# Patient Record
Sex: FEMALE | Race: BLACK OR AFRICAN AMERICAN | Employment: FULL TIME | URBAN - METROPOLITAN AREA
[De-identification: names, ages, dates, MRNs, and addresses within clinical notes are randomized per-mention and may not be internally consistent; named-entity substitution may affect disease eponyms.]

---

## 2017-06-23 ENCOUNTER — OP HISTORICAL/CONVERTED ENCOUNTER (OUTPATIENT)
Dept: OTHER | Age: 52
End: 2017-06-23

## 2017-10-27 LAB — COLONOSCOPY, EXTERNAL: NORMAL

## 2018-03-05 ENCOUNTER — OP HISTORICAL/CONVERTED ENCOUNTER (OUTPATIENT)
Dept: OTHER | Age: 53
End: 2018-03-05

## 2018-06-13 ENCOUNTER — ED HISTORICAL/CONVERTED ENCOUNTER (OUTPATIENT)
Dept: OTHER | Age: 53
End: 2018-06-13

## 2019-01-17 ENCOUNTER — OP HISTORICAL/CONVERTED ENCOUNTER (OUTPATIENT)
Dept: OTHER | Age: 54
End: 2019-01-17

## 2019-03-17 ENCOUNTER — OP HISTORICAL/CONVERTED ENCOUNTER (OUTPATIENT)
Dept: OTHER | Age: 54
End: 2019-03-17

## 2020-03-20 LAB
HBA1C MFR BLD HPLC: 6.2 %
LDL-C, EXTERNAL: NORMAL

## 2020-08-19 ENCOUNTER — OFFICE VISIT (OUTPATIENT)
Dept: PRIMARY CARE CLINIC | Age: 55
End: 2020-08-19
Payer: MEDICARE

## 2020-08-19 VITALS
HEART RATE: 102 BPM | SYSTOLIC BLOOD PRESSURE: 132 MMHG | OXYGEN SATURATION: 97 % | TEMPERATURE: 98.1 F | DIASTOLIC BLOOD PRESSURE: 80 MMHG

## 2020-08-19 DIAGNOSIS — Z13.6 SCREENING FOR CARDIOVASCULAR, RESPIRATORY, AND GENITOURINARY DISEASES: Primary | ICD-10-CM

## 2020-08-19 DIAGNOSIS — Z13.89 SCREENING FOR CARDIOVASCULAR, RESPIRATORY, AND GENITOURINARY DISEASES: Primary | ICD-10-CM

## 2020-08-19 DIAGNOSIS — Z13.83 SCREENING FOR CARDIOVASCULAR, RESPIRATORY, AND GENITOURINARY DISEASES: Primary | ICD-10-CM

## 2020-08-19 PROCEDURE — 99213 OFFICE O/P EST LOW 20 MIN: CPT | Performed by: NURSE PRACTITIONER

## 2020-08-19 NOTE — PROGRESS NOTES
Robert Hayden is a 54 y.o. female who was seen in clinic today (8/19/2020) for an acute visit. Assessment/Plan:     There is a med-high probability that this is COVID-19. * COVID-19 sample collected and submitted       * Patient given detailed CDC instructions contained within After Visit Summary    Diagnoses and all orders for this visit:    1. Screening for cardiovascular, respiratory, and genitourinary diseases  -     NOVEL CORONAVIRUS (COVID-19)       Covid like s/s with no known covid exposure. Discussed expected course/resolution/complications of diagnosis in detail with patient. Advised pt on CDC guidance, OTC medications for symptom management, red flags reviewed and should develop to seek emergency medical attn. Encouraged pt to maintain health through a balanced diet, high in fiber and plants;small freq meals if any nausea; staying well hydrated by drinking water or occ low sugar non carbonated beverages; reviewed importance of proper sleep habitus, 7-8hrs of rest; and emphasized moderate exercise 30 mins a day/150mins a week. Reviewed with her that COVID-19 pandemic is an evolving situation with rapidly changing recommendations & guidelines, continue to practice hand hygiene, social distancing, wearing of facial coverings. Regardless of testing results, should still follow CDC guidelines as there is a chance of a false negative, or symptoms may be due to a cold or flu which are also transmissible. Medical decisions are made based on the the best information available at the time. Recommended she stay tuned for updates published by trusted sources and to advise your PCP of any unexpected changes in clinical condition     Subjective:   Sukhjinder Barillas was seen today for Concern For COVID-19 (Coronavirus)    Works at [employer], out of work since [employer] 5 with s/s. Has med hx including HIV, htn,  COPD. She has been using symbicort and rescue inhaler with some relief of s/s.   She notes h/a, fatigue, fever, cough, sob, diarrhea, all of which has been intermittent, some days better some days worse. Today she is feeling better. Former user of tob, quit 1 year ago. Travel Screening     Question   Response    In the last month, have you been in contact with someone who was confirmed or suspected to have Coronavirus / COVID-19? Yes    Do you have any of the following symptoms? Fever;Cough; Shortness of breath;Muscle pain;Severe headache;Diarrhea    Have you traveled internationally in the last month? No      Travel History   Travel since 07/19/20     No documented travel since 07/19/20          ROS - Pertinent items are noted in HPI    There is no problem list on file for this patient. Home Medications    Not on File      Allergies   Allergen Reactions    Bactrim [Sulfamethoprim] Nausea and Vomiting          Objective:   Physical Exam  General:  Overweight, alert, cooperative, no distress   Ears: Normal external ear canals AU   Sinuses: Normal paranasal sinuses without tenderness   Mouth:  Lips, mucosa, and tongue normal. Teeth and gums normal: oropharynx: clear   Neck: supple, symmetrical, trachea midline. Heart: normal rate, regular rhythm, normal S1, S2, no murmurs, rubs, clicks or gallops. Lungs: clear to auscultation bilaterally       Visit Vitals  /80 (BP 1 Location: Left arm, BP Patient Position: Sitting)   Pulse (!) 102   Temp 98.1 °F (36.7 °C)   SpO2 97%         Disclaimer:        Medication risks/benefits/costs/interactions/alternatives discussed with patient. She was given an after visit summary which includes diagnoses, current medications, & vitals. She expressed understanding with the diagnosis and plan. Aspects of this note may have been generated using voice recognition software. Despite editing, there may be some syntax errors.        Telma Madrid NP

## 2020-08-20 LAB — SARS-COV-2, NAA: NOT DETECTED

## 2020-08-21 ENCOUNTER — TELEPHONE (OUTPATIENT)
Dept: PRIMARY CARE CLINIC | Age: 55
End: 2020-08-21

## 2020-09-08 PROBLEM — R13.10 DYSPHAGIA: Status: ACTIVE | Noted: 2020-09-08

## 2020-09-08 PROBLEM — K64.9 HEMORRHOIDS: Status: ACTIVE | Noted: 2020-09-08

## 2020-09-08 PROBLEM — J32.9 CHRONIC SINUSITIS: Status: ACTIVE | Noted: 2020-09-08

## 2020-09-08 PROBLEM — L29.9 PRURITUS OF SKIN: Status: ACTIVE | Noted: 2020-09-08

## 2020-09-08 PROBLEM — M54.9 CHRONIC BACK PAIN: Status: ACTIVE | Noted: 2020-09-08

## 2020-09-08 PROBLEM — E87.6 HYPOKALEMIA: Status: ACTIVE | Noted: 2020-09-08

## 2020-09-08 PROBLEM — I10 ESSENTIAL HYPERTENSION: Status: ACTIVE | Noted: 2020-09-08

## 2020-09-08 PROBLEM — R63.5 ABNORMAL WEIGHT GAIN: Status: ACTIVE | Noted: 2020-09-08

## 2020-09-08 PROBLEM — G89.29 CHRONIC BACK PAIN: Status: ACTIVE | Noted: 2020-09-08
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
## 2020-09-08 PROBLEM — R07.0 PAIN IN THROAT: Status: ACTIVE | Noted: 2020-09-08

## 2020-09-08 PROBLEM — F17.200 TOBACCO DEPENDENCE: Status: ACTIVE | Noted: 2020-09-08

## 2020-09-08 PROBLEM — G58.9 MONONEURITIS: Status: ACTIVE | Noted: 2020-09-08

## 2020-09-08 PROBLEM — G45.9 TIA (TRANSIENT ISCHEMIC ATTACK): Status: ACTIVE | Noted: 2020-09-08

## 2020-09-08 PROBLEM — K21.9 GERD (GASTROESOPHAGEAL REFLUX DISEASE): Status: ACTIVE | Noted: 2020-09-08

## 2020-09-08 PROBLEM — E55.9 VITAMIN D DEFICIENCY: Status: ACTIVE | Noted: 2020-09-08

## 2020-09-08 PROBLEM — D64.9 ANEMIA: Status: ACTIVE | Noted: 2020-09-08

## 2020-09-08 PROBLEM — F32.A DEPRESSIVE DISORDER: Status: ACTIVE | Noted: 2020-09-08

## 2020-09-08 PROBLEM — Z91.199 NONCOMPLIANCE WITH TREATMENT: Status: ACTIVE | Noted: 2020-09-08

## 2020-09-08 PROBLEM — J44.9 COPD (CHRONIC OBSTRUCTIVE PULMONARY DISEASE) (HCC): Status: ACTIVE | Noted: 2020-09-08

## 2020-09-08 PROBLEM — G47.00 INSOMNIA: Status: ACTIVE | Noted: 2020-09-08

## 2020-09-08 PROBLEM — L03.119 CELLULITIS OF LOWER LIMB: Status: ACTIVE | Noted: 2020-09-08

## 2020-09-09 ENCOUNTER — OFFICE VISIT (OUTPATIENT)
Dept: INTERNAL MEDICINE CLINIC | Age: 55
End: 2020-09-09
Payer: MEDICARE

## 2020-09-09 VITALS
DIASTOLIC BLOOD PRESSURE: 80 MMHG | SYSTOLIC BLOOD PRESSURE: 110 MMHG | BODY MASS INDEX: 28.67 KG/M2 | TEMPERATURE: 97.9 F | HEART RATE: 68 BPM | WEIGHT: 161.8 LBS | HEIGHT: 63 IN | RESPIRATION RATE: 18 BRPM | OXYGEN SATURATION: 99 %

## 2020-09-09 DIAGNOSIS — Z02.9 ADMINISTRATIVE ENCOUNTER: ICD-10-CM

## 2020-09-09 DIAGNOSIS — H10.32 ACUTE CONJUNCTIVITIS OF LEFT EYE, UNSPECIFIED ACUTE CONJUNCTIVITIS TYPE: ICD-10-CM

## 2020-09-09 DIAGNOSIS — B20 HIV INFECTION, UNSPECIFIED SYMPTOM STATUS (HCC): ICD-10-CM

## 2020-09-09 DIAGNOSIS — R19.7 DIARRHEA, UNSPECIFIED TYPE: Primary | ICD-10-CM

## 2020-09-09 PROBLEM — Z91.199 NONCOMPLIANCE WITH TREATMENT: Status: ACTIVE | Noted: 2020-09-09

## 2020-09-09 PROBLEM — M54.2 NECK PAIN: Status: ACTIVE | Noted: 2020-09-09

## 2020-09-09 PROBLEM — G58.9 MONONEURITIS: Status: ACTIVE | Noted: 2020-09-09

## 2020-09-09 PROBLEM — R63.5 ABNORMAL WEIGHT GAIN: Status: ACTIVE | Noted: 2020-09-09

## 2020-09-09 PROBLEM — G47.00 INSOMNIA: Status: ACTIVE | Noted: 2020-09-09

## 2020-09-09 PROBLEM — I10 ESSENTIAL HYPERTENSION: Status: ACTIVE | Noted: 2020-09-09

## 2020-09-09 PROBLEM — R13.10 DYSPHAGIA: Status: ACTIVE | Noted: 2020-09-09
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
## 2020-09-09 PROBLEM — R07.0 PAIN IN THROAT: Status: ACTIVE | Noted: 2020-09-09

## 2020-09-09 PROBLEM — E78.5 DYSLIPIDEMIA: Status: ACTIVE | Noted: 2020-09-09

## 2020-09-09 PROBLEM — F32.A DEPRESSIVE DISORDER: Status: ACTIVE | Noted: 2020-09-09

## 2020-09-09 PROBLEM — E11.9 DM (DIABETES MELLITUS), TYPE 2 (HCC): Status: ACTIVE | Noted: 2020-09-09

## 2020-09-09 PROBLEM — E55.9 VITAMIN D DEFICIENCY: Status: ACTIVE | Noted: 2020-09-09

## 2020-09-09 PROBLEM — L02.419 CELLULITIS AND ABSCESS OF LOWER EXTREMITY: Status: ACTIVE | Noted: 2020-09-09

## 2020-09-09 PROBLEM — L03.119 CELLULITIS AND ABSCESS OF LOWER EXTREMITY: Status: ACTIVE | Noted: 2020-09-09

## 2020-09-09 PROBLEM — G45.9 TIA (TRANSIENT ISCHEMIC ATTACK): Status: ACTIVE | Noted: 2020-09-09

## 2020-09-09 PROBLEM — J44.9 COPD (CHRONIC OBSTRUCTIVE PULMONARY DISEASE) (HCC): Status: ACTIVE | Noted: 2020-09-09

## 2020-09-09 PROCEDURE — G0444 DEPRESSION SCREEN ANNUAL: HCPCS | Performed by: INTERNAL MEDICINE

## 2020-09-09 PROCEDURE — 99213 OFFICE O/P EST LOW 20 MIN: CPT | Performed by: INTERNAL MEDICINE

## 2020-09-09 RX ORDER — BUDESONIDE AND FORMOTEROL FUMARATE DIHYDRATE 160; 4.5 UG/1; UG/1
2 AEROSOL RESPIRATORY (INHALATION)
COMMUNITY
Start: 2020-09-03 | End: 2021-03-31 | Stop reason: SDUPTHER

## 2020-09-09 RX ORDER — TRAZODONE HYDROCHLORIDE 100 MG/1
1 TABLET ORAL DAILY
COMMUNITY
Start: 2020-07-09 | End: 2021-03-31 | Stop reason: ALTCHOICE

## 2020-09-09 RX ORDER — ACYCLOVIR 400 MG/1
1 TABLET ORAL 2 TIMES DAILY
COMMUNITY
Start: 2020-09-03

## 2020-09-09 RX ORDER — GABAPENTIN 600 MG/1
1 TABLET ORAL 3 TIMES DAILY
COMMUNITY
End: 2020-10-09

## 2020-09-09 RX ORDER — OMEPRAZOLE 40 MG/1
1 CAPSULE, DELAYED RELEASE ORAL DAILY
COMMUNITY
Start: 2020-09-03 | End: 2021-03-31 | Stop reason: SDUPTHER

## 2020-09-09 RX ORDER — MULTIVIT-MIN/FA/LYCOPEN/LUTEIN .4-300-25
1 TABLET ORAL DAILY
COMMUNITY
Start: 2020-09-03 | End: 2021-05-19

## 2020-09-09 RX ORDER — GENTAMICIN SULFATE 3 MG/ML
1 SOLUTION/ DROPS OPHTHALMIC EVERY 4 HOURS
Qty: 1 BOTTLE | Refills: 0 | Status: SHIPPED | OUTPATIENT
Start: 2020-09-09 | End: 2020-10-09

## 2020-09-09 RX ORDER — MONTELUKAST SODIUM 10 MG/1
1 TABLET ORAL DAILY
COMMUNITY
Start: 2020-07-09 | End: 2020-10-16

## 2020-09-09 RX ORDER — TRIAMTERENE AND HYDROCHLOROTHIAZIDE 37.5; 25 MG/1; MG/1
1 CAPSULE ORAL DAILY
COMMUNITY
Start: 2020-07-09 | End: 2020-10-09

## 2020-09-09 RX ORDER — ZOLPIDEM TARTRATE 5 MG/1
1 TABLET ORAL
COMMUNITY
End: 2020-10-09 | Stop reason: SDUPTHER

## 2020-09-09 RX ORDER — ALBUTEROL SULFATE 90 UG/1
2 AEROSOL, METERED RESPIRATORY (INHALATION) AS NEEDED
COMMUNITY
Start: 2020-09-03 | End: 2021-03-31 | Stop reason: SDUPTHER

## 2020-09-09 RX ORDER — SERTRALINE HYDROCHLORIDE 50 MG/1
1 TABLET, FILM COATED ORAL DAILY
COMMUNITY
Start: 2020-09-03 | End: 2021-03-31 | Stop reason: SDUPTHER

## 2020-09-09 RX ORDER — FLUTICASONE PROPIONATE 50 MCG
2 SPRAY, SUSPENSION (ML) NASAL DAILY
COMMUNITY
End: 2021-03-31 | Stop reason: SDUPTHER

## 2020-09-09 RX ORDER — ASPIRIN 81 MG/1
1 TABLET ORAL DAILY
COMMUNITY

## 2020-09-09 RX ORDER — BICTEGRAVIR SODIUM, EMTRICITABINE, AND TENOFOVIR ALAFENAMIDE FUMARATE 50; 200; 25 MG/1; MG/1; MG/1
1 TABLET ORAL DAILY
COMMUNITY
Start: 2020-09-02

## 2020-09-09 NOTE — PROGRESS NOTES
Jose Daniel Pearce is a 54 y.o. female and presents with Pink Eye (C/O  painful LT  eye with drainage  8/10)    Came here having, out of work since  last Saturday having diarrhea watery, and also, pinkeye in the left eye she used some drops and now she has no pinkeye, but smiled hurting,In her left eye without any discharge,No fever or chills,She went to the flu clinic, she was tested for COVID-19 and she is negative for that she told me that she has some watery diarrhea she did not bring any medications her blood pressure is controlled she is taking antiretroviral medicine for her HIV, also taking antihypertensive for hypertension and taking antidepression, having depression not a good historian, not bringing any medication bottles to be, reconciliation, in the last visit she told that she quit smoking cigarette,. He had history of COPD in the past.  She wants notes to go back to the work on the.  Friday no history of eating from outside, no blood in stool no nausea vomiting, she told me she has generalized vague discomfort in the abdomen does not want to go to ER,. Not a reliable historian. Review of Systems    Review of Systems   Constitutional: Negative. HENT: Negative for congestion and sinus pain. Eyes: Negative for blurred vision, double vision, photophobia, discharge and redness. she had pinkeye in the left eye 5 days ago but now she has no redness but according to her she has mild discomfort no discharge,. No fever or chills. ,   Respiratory: Negative for cough, shortness of breath and wheezing. Cardiovascular: Negative. Gastrointestinal: Positive for diarrhea. Negative for abdominal pain, heartburn, nausea and vomiting. Genitourinary: Negative for dysuria, hematuria and urgency. Musculoskeletal: Negative. Neurological: Negative for dizziness, tingling and sensory change. Endo/Heme/Allergies: Negative for polydipsia. Psychiatric/Behavioral: Negative for depression.  The patient is not nervous/anxious. Past Medical History:   Diagnosis Date    Abnormal weight gain 9/9/2020    Asthma     Cellulitis and abscess of lower extremity 9/9/2020    Contact dermatitis and eczema due to cause     COPD [facility]) [facility] 9/9/2020    Depression     Depressive disorder 9/9/2020    Diabetes ([facility]     DM (diabetes mellitus), type 2 ([facility] 9/9/2020    Dyslipidemia 9/9/2020    Dysphagia 9/9/2020    Essential hypertension 9/9/2020    GERD (gastroesophageal reflux disease)     Headache     HIV (human immunodeficiency virus infection) ([facility]     Hypercholesterolemia     Hypertension     Insomnia 9/9/2020    Mononeuritis 9/9/2020    Neck pain 9/9/2020    Noncompliance with treatment 9/9/2020    Pain in throat 9/9/2020    TIA (transient ischemic attack) 9/9/2020    TIA (transient ischemic attack) 9/9/2020    Tick bite 9/9/2020    Vitamin D deficiency 9/9/2020     Past Surgical History:   Procedure Laterality Date    HX APPENDECTOMY      HX APPENDECTOMY  2012    HX COLONOSCOPY      HX COLONOSCOPY  06/25/2017    HX GI      HX GYN      HX TOTAL LAPAROSCOPIC HYSTERECTOMY  2013     Social History     Socioeconomic History    Marital status: SINGLE     Spouse name: Not on file    Number of children: Not on file    Years of education: Not on file    Highest education level: Not on file   Tobacco Use    Smoking status: Former Smoker    Smokeless tobacco: Never Used    Tobacco comment: QUIT 4 MONTHS AG0   Substance and Sexual Activity    Alcohol use: Not Currently    Drug use: Never     Family History   Problem Relation Age of Onset    Hypertension Mother     Thyroid Disease Mother     Heart Disease Father     Hypertension Father     Diabetes Father     Diabetes Maternal Grandmother      Current Outpatient Medications   Medication Sig Dispense Refill    aspirin delayed-release 81 mg tablet Take 1 Tab by mouth daily.       zolpidem (AMBIEN) 5 mg tablet Take 1 Tab by mouth nightly as needed.  fluticasone propionate (FLONASE) 50 mcg/actuation nasal spray 2 Sprays by Both Nostrils route daily.  gabapentin (NEURONTIN) 600 mg tablet Take 1 Tab by mouth three (3) times daily.  gentamicin (GARAMYCIN) 0.3 % ophthalmic solution Administer 1 Drop to left eye every four (4) hours. For 5 days . 1 Bottle 0    acyclovir (ZOVIRAX) 400 mg tablet Take 1 Tab by mouth as needed.  Biktarvy tab tablet Take 1 Tab by mouth daily.  albuterol (PROVENTIL HFA, VENTOLIN HFA, PROAIR HFA) 90 mcg/actuation inhaler Take 2 Puffs by inhalation as needed.  Symbicort 160-4.5 mcg/actuation HFAA Take 2 Puffs by inhalation every twelve (12) hours as needed.  montelukast (SINGULAIR) 10 mg tablet Take 1 Tab by mouth daily.  Spectravite Adult 50 Plus 0.4-300-250 mg-mcg-mcg tab Take 1 Tab by mouth daily.  omeprazole (PRILOSEC) 40 mg capsule Take 1 Cap by mouth daily.  sertraline (ZOLOFT) 50 mg tablet Take 1 Tab by mouth daily.  traZODone (DESYREL) 100 mg tablet Take 1 Tab by mouth daily.  triamterene-hydroCHLOROthiazide (DYAZIDE) 37.5-25 mg per capsule Take 1 Cap by mouth daily. Allergies   Allergen Reactions    Lisinopril Other (comments)     PT.  States causes swelling lf the throat    Bactrim [Sulfamethoprim] Hives    Elavil Hives    Iron Nausea and Vomiting    Lyrica [Pregabalin] Hives       Objective:  Visit Vitals  /80 (BP 1 Location: Right arm, BP Patient Position: Sitting)   Pulse 68   Temp 97.9 °F (36.6 °C) (Oral)   Resp 18   Ht 5' 3\" (1.6 m)   Wt 161 lb 12.8 oz (73.4 kg)   SpO2 99%   BMI 28.66 kg/m²       Physical Exam:   Constitutional: General Appearance: obese. Level of Distress: NAD. Ambulation: ambulating normal  Psychiatric: Mental Status: normal mood and affect and active and alert. Orientation: to time, place, and person. no agitation. ,normal eye contact.   normal insight  Head: Head: normocephalic and atraumatic. Eyes: Pupils: PERRLA. Sclerae: non-icteric. I did not see any eye discharge or redness in her eyes. Neck: Neck: supple, trachea midline, and no masses. Lymph Nodes: no cervical LAD. Thyroid: no enlargement or nodules and non-tender. Lungs: Respiratory effort: no dyspnea. Auscultation: no wheezing, rales/crackles, or rhonchi and breath sounds normal and good air movement. Cardiovascular: Apical Impulse: not displaced. Heart Auscultation: normal S1 and S2; no murmurs, rubs, or gallops; and RRR. Neck vessels: no carotid bruits. Pulses including femoral / pedal: normal throughout. Abdomen: Bowel Sounds: normal. Inspection and Palpation: no tenderness, guarding, or masses and soft and non-distended. Liver: non-tender and no hepatomegaly. Spleen: non-tender and no splenomegaly. Musculoskeletal[de-identified] Extremities: no edema,no varicosities. No Calf tenderness. Neurologic: Gait and Station: normal gait and station. Motor Strength normal right and left. Skin: Inspection and palpation: no rash, lesions, or ulcer. I did not palpate any tenderness, or rigidity in her abdomen,. Results for orders placed or performed in visit on 08/19/20   NOVEL CORONAVIRUS (COVID-19)   Result Value Ref Range    SARS-CoV-2, NIDA Not Detected Not Detected       Assessment/Plan    History of  left conjunctivitis in left eye, clinically today I did not see any congestion or redness or tenderness she has normal eye movement and no discharge according to her she has still having discomfort and she considers it as hurting, started on gentamicin cream, she has been checked before COVID-19 and she is negative for that. Warm compression. She needs excuse from work until Friday.     History of diarrhea no good history available not a reliable historian, she has been taking antiretroviral medicine for HIV her blood pressure is controlled no nausea vomiting, she told me that she has nothing to do with her medications diarrhea started all of a sudden, and she had some diffuse vague discomfort in stomach she does not want to go to ER recommended to drink plenty of, Pedialyte and Gatorade to prevent dehydration and she can take OTC Imodium once or twice a day but if she has persistent diarrhea she should go to ER she should not continue Imodium for more than  24 to 36 hours. Discussed about healthy diet. Personal hygiene and sensitization. She has regular appointment coming in next few months but she does not recall she had her labs done in March. She had a high triglyceride. She also takes antidepression medicine sertraline. She told me she has quit smoking cigarettes. And her blood pressure is well controlled today. Follow-up in 4 weeks. Excuse from work given from last Saturday until this Friday. She wants to go back on 11th September. Patient in counseling given. Answered all the questions and if she has been not improving I strongly recommended to see ophthalmologist and to go to ER, for her abdominal pain,. I have formally requested her to bring all her medications bottles to verify and to do medicine reconciliation. There are no Patient Instructions on file for this visit. Follow-up and Dispositions    · Return in about 4 weeks (around 10/7/2020) for htn,marimar and lots of problems.

## 2020-09-09 NOTE — LETTER
NOTIFICATION RETURN TO WORK / SCHOOL 
 
9/9/2020 9:22 AM 
 
[address] Cari King [address] [phone] To Whom It May Concern: 
 
Cari King is currently under the care of [address] [place] [address]. She will return to work/school on:11 th September ,she was sick since last Saturday and stayed out of work due to her illness. she has been visited today by me. If there are questions or concerns please have the patient contact our office. Sincerely, Coco Ayers MD

## 2020-09-10 ENCOUNTER — TELEPHONE (OUTPATIENT)
Dept: INTERNAL MEDICINE CLINIC | Age: 55
End: 2020-09-10

## 2020-09-10 ENCOUNTER — OFFICE VISIT (OUTPATIENT)
Dept: PRIMARY CARE CLINIC | Age: 55
End: 2020-09-10
Payer: MEDICARE

## 2020-09-10 VITALS — OXYGEN SATURATION: 97 % | HEART RATE: 110 BPM | TEMPERATURE: 97.3 F

## 2020-09-10 DIAGNOSIS — Z13.6 SCREENING FOR CARDIOVASCULAR, RESPIRATORY, AND GENITOURINARY DISEASES: Primary | ICD-10-CM

## 2020-09-10 DIAGNOSIS — B37.0 THRUSH: ICD-10-CM

## 2020-09-10 DIAGNOSIS — Z13.89 SCREENING FOR CARDIOVASCULAR, RESPIRATORY, AND GENITOURINARY DISEASES: Primary | ICD-10-CM

## 2020-09-10 DIAGNOSIS — R19.7 DIARRHEA, UNSPECIFIED TYPE: ICD-10-CM

## 2020-09-10 DIAGNOSIS — Z13.83 SCREENING FOR CARDIOVASCULAR, RESPIRATORY, AND GENITOURINARY DISEASES: Primary | ICD-10-CM

## 2020-09-10 PROCEDURE — 99213 OFFICE O/P EST LOW 20 MIN: CPT | Performed by: NURSE PRACTITIONER

## 2020-09-10 RX ORDER — NYSTATIN 100000 [USP'U]/ML
1 SUSPENSION ORAL 4 TIMES DAILY
Qty: 60 ML | Refills: 1 | Status: SHIPPED | OUTPATIENT
Start: 2020-09-10 | End: 2020-10-09 | Stop reason: SDUPTHER

## 2020-09-10 RX ORDER — LOPERAMIDE HYDROCHLORIDE 2 MG/1
CAPSULE ORAL
Qty: 40 CAP | Refills: 0 | Status: SHIPPED | OUTPATIENT
Start: 2020-09-10 | End: 2020-10-09

## 2020-09-10 NOTE — TELEPHONE ENCOUNTER
S/W pt. C/O  Thrush in mouth, weak, fatigue, no taste, denies fever, Instructed to go to the [address] for evaluation.  Pt. agreed

## 2020-09-10 NOTE — PROGRESS NOTES
Subjective  Chief Complaint   Patient presents with    Concern For COVID-19 (Coronavirus)     HPI:  Jose Daniel Pearce is a 54 y.o. female. That presents with symptoms that have started over the last 24-48 hours. She saw her PCP yesterday for pink eye and was given medication for the pink eye but was also having diarrhea at that time but was not given a script for it and states she was told to stay well hydrated with pedialyte. Patient states that for the last 2 days she has had 8-10 loose stools that are\" just water\". She also states that she woke up this morning to white patches on her cheeks and the back of her throat and she recognizes it as thrush which she has had before. She has not tried anything to make the thrush better or worse and with the diarrhea it is worse the more she consumes and she has not tried anything that has made it better. It is notable that patient has diagnoses of hypertension, diabetes, COPD and is HIV positive. She also had a stroke in January and one in March. She was a smoker until her stroke in March when she quit smoking. She does not monitor her blood pressure at home and states that she has recently been discontinued from her diabetic medications but does not monitor her blood sugars at home.   Patient works at [employer] [address]    Past Medical History:   Diagnosis Date    Abnormal weight gain 9/9/2020    Asthma     Cellulitis and abscess of lower extremity 9/9/2020    Contact dermatitis and eczema due to cause     COPD (chronic obstructive [facility] disease) [facility] 9/9/2020    Depression     Depressive disorder 9/9/2020    Diabetes ([address]     DM (diabetes mellitus), type 2 ([address] 9/9/2020    Dyslipidemia 9/9/2020    Dysphagia 9/9/2020    Essential hypertension 9/9/2020    GERD (gastroesophageal reflux disease)     Headache     HIV (human immunodeficiency virus infection) [facility]     Hypercholesterolemia     Hypertension     Insomnia 9/9/2020    Mononeuritis 9/9/2020    Neck pain 9/9/2020    Noncompliance with treatment 9/9/2020    Pain in throat 9/9/2020    TIA (transient ischemic attack) 9/9/2020    TIA (transient ischemic attack) 9/9/2020    Tick bite 9/9/2020    Vitamin D deficiency 9/9/2020     Family History   Problem Relation Age of Onset    Hypertension Mother     Thyroid Disease Mother     Heart Disease Father     Hypertension Father     Diabetes Father     Diabetes Maternal Grandmother      Social History     Socioeconomic History    Marital status: SINGLE     Spouse name: Not on file    Number of children: Not on file    Years of education: Not on file    Highest education level: Not on file   Occupational History    Not on file   Social Needs    Financial resource strain: Not on file    Food insecurity     Worry: Not on file     Inability: Not on file    Transportation needs     Medical: Not on file     Non-medical: Not on file   Tobacco Use    Smoking status: Former Smoker    Smokeless tobacco: Never Used    Tobacco comment: QUIT 4 MONTHS AG0   Substance and Sexual Activity    Alcohol use: Not Currently    Drug use: Never    Sexual activity: Not on file   Lifestyle    Physical activity     Days per week: Not on file     Minutes per session: Not on file    Stress: Not on file   Relationships    Social connections     Talks on phone: Not on file     Gets together: Not on file     Attends Synagogue service: Not on file     Active member of club or organization: Not on file     Attends meetings of clubs or organizations: Not on file     Relationship status: Not on file    Intimate partner violence     Fear of current or ex partner: Not on file     Emotionally abused: Not on file     Physically abused: Not on file     Forced sexual activity: Not on file   Other Topics Concern    Not on file   Social History Narrative    Not on file     Current Outpatient Medications on File Prior to Visit   Medication Sig Dispense Refill    acyclovir (ZOVIRAX) 400 mg tablet Take 1 Tab by mouth as needed.  Biktarvy tab tablet Take 1 Tab by mouth daily.  albuterol (PROVENTIL HFA, VENTOLIN HFA, PROAIR HFA) 90 mcg/actuation inhaler Take 2 Puffs by inhalation as needed.  Symbicort 160-4.5 mcg/actuation HFAA Take 2 Puffs by inhalation every twelve (12) hours as needed.  montelukast (SINGULAIR) 10 mg tablet Take 1 Tab by mouth daily.  Spectravite Adult 50 Plus 0.4-300-250 mg-mcg-mcg tab Take 1 Tab by mouth daily.  omeprazole (PRILOSEC) 40 mg capsule Take 1 Cap by mouth daily.  sertraline (ZOLOFT) 50 mg tablet Take 1 Tab by mouth daily.  traZODone (DESYREL) 100 mg tablet Take 1 Tab by mouth daily.  triamterene-hydroCHLOROthiazide (DYAZIDE) 37.5-25 mg per capsule Take 1 Cap by mouth daily.  aspirin delayed-release 81 mg tablet Take 1 Tab by mouth daily.  zolpidem (AMBIEN) 5 mg tablet Take 1 Tab by mouth nightly as needed.  fluticasone propionate (FLONASE) 50 mcg/actuation nasal spray 2 Sprays by Both Nostrils route daily.  gabapentin (NEURONTIN) 600 mg tablet Take 1 Tab by mouth three (3) times daily.  gentamicin (GARAMYCIN) 0.3 % ophthalmic solution Administer 1 Drop to left eye every four (4) hours. For 5 days . 1 Bottle 0     No current facility-administered medications on file prior to visit. Allergies   Allergen Reactions    Lisinopril Other (comments)     PT.  States causes swelling lf the throat    Bactrim [Sulfamethoprim] Hives    Elavil Hives    Iron Nausea and Vomiting    Lyrica [Pregabalin] Hives     ROS   ROS as per PMH and HPI      Objective  Physical Exam  HENT:      Head: Normocephalic. Mouth/Throat:      Comments: White patches on tongue and back of throat consistent with thrush  Neck:      Musculoskeletal: Normal range of motion. Cardiovascular:      Rate and Rhythm: Normal rate and regular rhythm.    Pulmonary:      Effort: Pulmonary effort is normal.   Abdominal: General: Bowel sounds are normal.      Palpations: Abdomen is soft. Musculoskeletal: Normal range of motion. Skin:     General: Skin is warm. Neurological:      Mental Status: She is alert and oriented to person, place, and time. Psychiatric:         Mood and Affect: Mood normal.         Behavior: Behavior normal.         Thought Content: Thought content normal.          Assessment & Plan      ICD-10-CM ICD-9-CM    1. Screening for cardiovascular, respiratory, and genitourinary diseases  Z13.6 V81.2 NOVEL CORONAVIRUS (COVID-19)    Z13.89 V81.6     Z13.83 V81.4    2. Diarrhea, unspecified type  R19.7 787.91    3. Thrush  B37.0 112.0      Diagnoses and all orders for this visit:    1. Screening for cardiovascular, respiratory, and genitourinary diseases  -     NOVEL CORONAVIRUS (COVID-19)    2. Diarrhea, unspecified type    3.  Marilee Kwan NP

## 2020-09-10 NOTE — PATIENT INSTRUCTIONS
Per CDC guideline patient is to self quarantine until the results of the Covid 19 testing is completed.   Also given written instruction on when to discontinue self quarantine as follows: (1)  3 days have passed since last fever without the use of fever reducing medications (2) AND improvement of symptoms AND (3) at least 3 days have passed since symptoms first appeared AND (4) 10 days have passed since first positive Covid 19 test (Patient has not tested as positive at this time and test results should be back in 6 to 10 days)

## 2020-09-13 LAB — SARS-COV-2, NAA: NOT DETECTED

## 2020-09-14 ENCOUNTER — TELEPHONE (OUTPATIENT)
Dept: PRIMARY CARE CLINIC | Age: 55
End: 2020-09-14

## 2020-09-15 ENCOUNTER — TELEPHONE (OUTPATIENT)
Dept: PRIMARY CARE CLINIC | Age: 55
End: 2020-09-15

## 2020-10-04 ENCOUNTER — APPOINTMENT (OUTPATIENT)
Dept: GENERAL RADIOLOGY | Age: 55
DRG: 638 | End: 2020-10-04
Attending: NURSE PRACTITIONER
Payer: MEDICARE

## 2020-10-04 ENCOUNTER — HOSPITAL ENCOUNTER (INPATIENT)
Age: 55
LOS: 3 days | Discharge: HOME | DRG: 638 | End: 2020-10-07
Attending: HOSPITALIST | Admitting: HOSPITALIST
Payer: MEDICARE

## 2020-10-04 DIAGNOSIS — R73.9 HYPERGLYCEMIA: ICD-10-CM

## 2020-10-04 DIAGNOSIS — Z20.822 ENCOUNTER FOR SCREENING LABORATORY TESTING FOR COVID-19 VIRUS: ICD-10-CM

## 2020-10-04 DIAGNOSIS — B37.0 ORAL THRUSH: ICD-10-CM

## 2020-10-04 DIAGNOSIS — B20 HIV INFECTION, UNSPECIFIED SYMPTOM STATUS (HCC): ICD-10-CM

## 2020-10-04 DIAGNOSIS — B37.31 VAGINAL CANDIDA: Primary | ICD-10-CM

## 2020-10-04 PROBLEM — E11.00 TYPE 2 DIABETES MELLITUS WITH HYPEROSMOLAR NONKETOTIC HYPERGLYCEMIA (HCC): Status: ACTIVE | Noted: 2020-10-04

## 2020-10-04 LAB
ANION GAP SERPL CALC-SCNC: 9 MMOL/L (ref 5–15)
APPEARANCE UR: CLEAR
BACTERIA URNS QL MICRO: NEGATIVE /HPF
BASOPHILS # BLD: 0 K/UL (ref 0–0.1)
BASOPHILS NFR BLD: 0 % (ref 0–1)
BILIRUB UR QL: NEGATIVE
BUN SERPL-MCNC: 20 MG/DL (ref 6–20)
BUN/CREAT SERPL: 11 (ref 12–20)
CA-I BLD-MCNC: 10.4 MG/DL (ref 8.5–10.1)
CHLORIDE SERPL-SCNC: 82 MMOL/L (ref 97–108)
CO2 SERPL-SCNC: 29 MMOL/L (ref 21–32)
COLOR UR: ABNORMAL
CREAT SERPL-MCNC: 1.87 MG/DL (ref 0.55–1.02)
DIFFERENTIAL METHOD BLD: ABNORMAL
EOSINOPHIL # BLD: 0 K/UL (ref 0–0.4)
EOSINOPHIL NFR BLD: 0 % (ref 0–7)
ERYTHROCYTE [DISTWIDTH] IN BLOOD BY AUTOMATED COUNT: 12.8 % (ref 11.5–14.5)
GLUCOSE SERPL-MCNC: 987 MG/DL (ref 65–100)
GLUCOSE UR STRIP.AUTO-MCNC: >300 MG/DL
HCT VFR BLD AUTO: 40.5 % (ref 35–47)
HGB BLD-MCNC: 14 G/DL (ref 11.5–16)
HGB UR QL STRIP: NEGATIVE
IMM GRANULOCYTES # BLD AUTO: 0 K/UL (ref 0–0.04)
IMM GRANULOCYTES NFR BLD AUTO: 0 % (ref 0–0.5)
KETONES UR QL STRIP.AUTO: NEGATIVE MG/DL
LEUKOCYTE ESTERASE UR QL STRIP.AUTO: NEGATIVE
LYMPHOCYTES # BLD: 2 K/UL (ref 0.8–3.5)
LYMPHOCYTES NFR BLD: 27 % (ref 12–49)
MCH RBC QN AUTO: 30.9 PG (ref 26–34)
MCHC RBC AUTO-ENTMCNC: 34.6 G/DL (ref 30–36.5)
MCV RBC AUTO: 89.4 FL (ref 80–99)
MONOCYTES # BLD: 0.4 K/UL (ref 0–1)
MONOCYTES NFR BLD: 5 % (ref 5–13)
MUCOUS THREADS URNS QL MICRO: ABNORMAL /LPF
NEUTS SEG # BLD: 4.9 K/UL (ref 1.8–8)
NEUTS SEG NFR BLD: 68 % (ref 32–75)
NITRITE UR QL STRIP.AUTO: NEGATIVE
PH UR STRIP: 6 [PH] (ref 5–8)
PLATELET # BLD AUTO: 238 K/UL (ref 150–400)
PMV BLD AUTO: 14 FL (ref 8.9–12.9)
POTASSIUM SERPL-SCNC: 5.4 MMOL/L (ref 3.5–5.1)
PROT UR STRIP-MCNC: NEGATIVE MG/DL
RBC # BLD AUTO: 4.53 M/UL (ref 3.8–5.2)
RBC #/AREA URNS HPF: ABNORMAL /HPF (ref 0–5)
SODIUM SERPL-SCNC: 120 MMOL/L (ref 136–145)
SP GR UR REFRACTOMETRY: 1.02 (ref 1–1.03)
UA: UC IF INDICATED,UAUC: ABNORMAL
UROBILINOGEN UR QL STRIP.AUTO: 0.1 EU/DL (ref 0.1–1)
WBC # BLD AUTO: 7.3 K/UL (ref 3.6–11)
WBC URNS QL MICRO: ABNORMAL /HPF (ref 0–4)

## 2020-10-04 PROCEDURE — 96375 TX/PRO/DX INJ NEW DRUG ADDON: CPT

## 2020-10-04 PROCEDURE — 99285 EMERGENCY DEPT VISIT HI MDM: CPT

## 2020-10-04 PROCEDURE — 83036 HEMOGLOBIN GLYCOSYLATED A1C: CPT

## 2020-10-04 PROCEDURE — 85025 COMPLETE CBC W/AUTO DIFF WBC: CPT

## 2020-10-04 PROCEDURE — 65270000029 HC RM PRIVATE

## 2020-10-04 PROCEDURE — 87635 SARS-COV-2 COVID-19 AMP PRB: CPT

## 2020-10-04 PROCEDURE — 71045 X-RAY EXAM CHEST 1 VIEW: CPT

## 2020-10-04 PROCEDURE — 36415 COLL VENOUS BLD VENIPUNCTURE: CPT

## 2020-10-04 PROCEDURE — 74011250636 HC RX REV CODE- 250/636: Performed by: NURSE PRACTITIONER

## 2020-10-04 PROCEDURE — 81001 URINALYSIS AUTO W/SCOPE: CPT

## 2020-10-04 PROCEDURE — 96374 THER/PROPH/DIAG INJ IV PUSH: CPT

## 2020-10-04 PROCEDURE — 74011250637 HC RX REV CODE- 250/637: Performed by: NURSE PRACTITIONER

## 2020-10-04 PROCEDURE — 74011636637 HC RX REV CODE- 636/637: Performed by: NURSE PRACTITIONER

## 2020-10-04 PROCEDURE — 80048 BASIC METABOLIC PNL TOTAL CA: CPT

## 2020-10-04 RX ORDER — SODIUM CHLORIDE 0.9 % (FLUSH) 0.9 %
5-40 SYRINGE (ML) INJECTION EVERY 8 HOURS
Status: DISCONTINUED | OUTPATIENT
Start: 2020-10-05 | End: 2020-10-07 | Stop reason: HOSPADM

## 2020-10-04 RX ORDER — ENOXAPARIN SODIUM 100 MG/ML
40 INJECTION SUBCUTANEOUS EVERY 24 HOURS
Status: DISCONTINUED | OUTPATIENT
Start: 2020-10-05 | End: 2020-10-07 | Stop reason: HOSPADM

## 2020-10-04 RX ORDER — SODIUM CHLORIDE 0.9 % (FLUSH) 0.9 %
5-40 SYRINGE (ML) INJECTION AS NEEDED
Status: DISCONTINUED | OUTPATIENT
Start: 2020-10-04 | End: 2020-10-07 | Stop reason: HOSPADM

## 2020-10-04 RX ORDER — FLUCONAZOLE 100 MG/1
150 TABLET ORAL
Status: COMPLETED | OUTPATIENT
Start: 2020-10-04 | End: 2020-10-04

## 2020-10-04 RX ORDER — INSULIN GLARGINE 100 [IU]/ML
0.2 INJECTION, SOLUTION SUBCUTANEOUS
Status: DISCONTINUED | OUTPATIENT
Start: 2020-10-05 | End: 2020-10-07 | Stop reason: HOSPADM

## 2020-10-04 RX ORDER — ONDANSETRON 2 MG/ML
4 INJECTION INTRAMUSCULAR; INTRAVENOUS
Status: COMPLETED | OUTPATIENT
Start: 2020-10-04 | End: 2020-10-04

## 2020-10-04 RX ORDER — FLUCONAZOLE 150 MG/1
150 TABLET ORAL
Qty: 1 TAB | Refills: 1 | Status: SHIPPED | OUTPATIENT
Start: 2020-10-04 | End: 2020-10-04

## 2020-10-04 RX ORDER — INSULIN LISPRO 100 [IU]/ML
INJECTION, SOLUTION INTRAVENOUS; SUBCUTANEOUS EVERY 4 HOURS
Status: DISCONTINUED | OUTPATIENT
Start: 2020-10-05 | End: 2020-10-06

## 2020-10-04 RX ORDER — SODIUM CHLORIDE 9 MG/ML
999 INJECTION, SOLUTION INTRAVENOUS ONCE
Status: COMPLETED | OUTPATIENT
Start: 2020-10-04 | End: 2020-10-04

## 2020-10-04 RX ORDER — SODIUM CHLORIDE 9 MG/ML
75 INJECTION, SOLUTION INTRAVENOUS CONTINUOUS
Status: DISCONTINUED | OUTPATIENT
Start: 2020-10-05 | End: 2020-10-07 | Stop reason: HOSPADM

## 2020-10-04 RX ORDER — NYSTATIN 100000 [USP'U]/ML
500000 SUSPENSION ORAL ONCE
Status: COMPLETED | OUTPATIENT
Start: 2020-10-04 | End: 2020-10-05

## 2020-10-04 RX ORDER — DEXTROSE 50 % IN WATER (D50W) INTRAVENOUS SYRINGE
25-50 AS NEEDED
Status: DISCONTINUED | OUTPATIENT
Start: 2020-10-04 | End: 2020-10-07 | Stop reason: HOSPADM

## 2020-10-04 RX ORDER — MAGNESIUM SULFATE 100 %
4 CRYSTALS MISCELLANEOUS AS NEEDED
Status: DISCONTINUED | OUTPATIENT
Start: 2020-10-04 | End: 2020-10-07 | Stop reason: HOSPADM

## 2020-10-04 RX ADMIN — SODIUM CHLORIDE 1000 ML: 9 INJECTION, SOLUTION INTRAVENOUS at 23:34

## 2020-10-04 RX ADMIN — FLUCONAZOLE 150 MG: 100 TABLET ORAL at 22:28

## 2020-10-04 RX ADMIN — ONDANSETRON 4 MG: 2 INJECTION INTRAMUSCULAR; INTRAVENOUS at 21:31

## 2020-10-04 RX ADMIN — SODIUM CHLORIDE 999 ML/HR: 9 INJECTION, SOLUTION INTRAVENOUS at 21:11

## 2020-10-04 RX ADMIN — INSULIN HUMAN 10 UNITS: 100 INJECTION, SOLUTION PARENTERAL at 22:26

## 2020-10-04 NOTE — ED TRIAGE NOTES
GOT THRUSH IN MOUTH, WENT TO [facility] AND GOT ABX BUT STATES IT'S GOTTEN WORSE AND IS NOW FEELING \"SICK. \"

## 2020-10-04 NOTE — Clinical Note
[address] [place] [address] EMERGENCY DEPT 
[facility] [address] [id number] [phone] Work/School Note Date: 10/4/2020 To Whom It May concern: 
 
Jelani Judge was evaulated by the following provider(s): 
Nurse Practitioner: Hank Calderón NP.   COVID19 virus is suspected. Per the CDC guidelines we recommend home isolation until the following conditions are all met: 1. At least 10 days have passed since symptoms first appeared and 2. At least 24 hours have passed since last fever without the use of fever-reducing medications and 
3. Symptoms (e.g., cough, shortness of breath) have improved Sincerely, Diogo Enriquez NP

## 2020-10-05 LAB
ALBUMIN SERPL-MCNC: 3.4 G/DL (ref 3.5–5)
ALBUMIN/GLOB SERPL: 0.8 {RATIO} (ref 1.1–2.2)
ALP SERPL-CCNC: 95 U/L (ref 45–117)
ALT SERPL-CCNC: 26 U/L (ref 12–78)
ANION GAP SERPL CALC-SCNC: 4 MMOL/L (ref 5–15)
AST SERPL W P-5'-P-CCNC: 21 U/L (ref 15–37)
BASOPHILS # BLD: 0 K/UL (ref 0–0.1)
BASOPHILS NFR BLD: 0 % (ref 0–1)
BILIRUB SERPL-MCNC: 0.7 MG/DL (ref 0.2–1)
BUN SERPL-MCNC: 13 MG/DL (ref 6–20)
BUN/CREAT SERPL: 11 (ref 12–20)
CA-I BLD-MCNC: 9.1 MG/DL (ref 8.5–10.1)
CHLORIDE SERPL-SCNC: 106 MMOL/L (ref 97–108)
CO2 SERPL-SCNC: 31 MMOL/L (ref 21–32)
CREAT SERPL-MCNC: 1.19 MG/DL (ref 0.55–1.02)
DIFFERENTIAL METHOD BLD: NORMAL
EOSINOPHIL # BLD: 0.1 K/UL (ref 0–0.4)
EOSINOPHIL NFR BLD: 1 % (ref 0–7)
ERYTHROCYTE [DISTWIDTH] IN BLOOD BY AUTOMATED COUNT: 12.6 % (ref 11.5–14.5)
GLOBULIN SER CALC-MCNC: 4.4 G/DL (ref 2–4)
GLUCOSE BLD STRIP.AUTO-MCNC: 213 MG/DL (ref 65–100)
GLUCOSE BLD STRIP.AUTO-MCNC: 237 MG/DL (ref 65–100)
GLUCOSE BLD STRIP.AUTO-MCNC: 257 MG/DL (ref 65–100)
GLUCOSE BLD STRIP.AUTO-MCNC: 304 MG/DL (ref 65–100)
GLUCOSE BLD STRIP.AUTO-MCNC: 573 MG/DL (ref 65–100)
GLUCOSE SERPL-MCNC: 276 MG/DL (ref 65–100)
HCT VFR BLD AUTO: 35.9 % (ref 35–47)
HGB BLD-MCNC: 12.7 G/DL (ref 11.5–16)
IMM GRANULOCYTES # BLD AUTO: 0 K/UL (ref 0–0.04)
IMM GRANULOCYTES NFR BLD AUTO: 0 % (ref 0–0.5)
LYMPHOCYTES # BLD: 3.2 K/UL (ref 0.8–3.5)
LYMPHOCYTES NFR BLD: 45 % (ref 12–49)
MCH RBC QN AUTO: 30.2 PG (ref 26–34)
MCHC RBC AUTO-ENTMCNC: 35.4 G/DL (ref 30–36.5)
MCV RBC AUTO: 85.3 FL (ref 80–99)
MONOCYTES # BLD: 0.8 K/UL (ref 0–1)
MONOCYTES NFR BLD: 11 % (ref 5–13)
NEUTS SEG # BLD: 3 K/UL (ref 1.8–8)
NEUTS SEG NFR BLD: 43 % (ref 32–75)
PERFORMED BY, TECHID: ABNORMAL
PLATELET # BLD AUTO: 218 K/UL (ref 150–400)
PMV BLD AUTO: 12.8 FL (ref 8.9–12.9)
POTASSIUM SERPL-SCNC: 3.8 MMOL/L (ref 3.5–5.1)
PROT SERPL-MCNC: 7.8 G/DL (ref 6.4–8.2)
RBC # BLD AUTO: 4.21 M/UL (ref 3.8–5.2)
SARS-COV-2, COV2: NORMAL
SODIUM SERPL-SCNC: 141 MMOL/L (ref 136–145)
WBC # BLD AUTO: 7.1 K/UL (ref 3.6–11)

## 2020-10-05 PROCEDURE — 36415 COLL VENOUS BLD VENIPUNCTURE: CPT

## 2020-10-05 PROCEDURE — 82962 GLUCOSE BLOOD TEST: CPT

## 2020-10-05 PROCEDURE — 65270000029 HC RM PRIVATE

## 2020-10-05 PROCEDURE — 74011250636 HC RX REV CODE- 250/636: Performed by: HOSPITALIST

## 2020-10-05 PROCEDURE — 80053 COMPREHEN METABOLIC PANEL: CPT

## 2020-10-05 PROCEDURE — 74011250637 HC RX REV CODE- 250/637: Performed by: HOSPITALIST

## 2020-10-05 PROCEDURE — 85025 COMPLETE CBC W/AUTO DIFF WBC: CPT

## 2020-10-05 PROCEDURE — 74011636637 HC RX REV CODE- 636/637: Performed by: HOSPITALIST

## 2020-10-05 RX ORDER — SERTRALINE HYDROCHLORIDE 50 MG/1
50 TABLET, FILM COATED ORAL DAILY
Status: DISCONTINUED | OUTPATIENT
Start: 2020-10-06 | End: 2020-10-07 | Stop reason: HOSPADM

## 2020-10-05 RX ORDER — TRAZODONE HYDROCHLORIDE 50 MG/1
100 TABLET ORAL
Status: DISCONTINUED | OUTPATIENT
Start: 2020-10-05 | End: 2020-10-07 | Stop reason: HOSPADM

## 2020-10-05 RX ORDER — BISMUTH SUBSALICYLATE 262 MG
1 TABLET,CHEWABLE ORAL DAILY
Status: DISCONTINUED | OUTPATIENT
Start: 2020-10-06 | End: 2020-10-07 | Stop reason: HOSPADM

## 2020-10-05 RX ORDER — ACYCLOVIR 800 MG/1
400 TABLET ORAL 2 TIMES DAILY
Status: DISCONTINUED | OUTPATIENT
Start: 2020-10-05 | End: 2020-10-07 | Stop reason: HOSPADM

## 2020-10-05 RX ORDER — BUDESONIDE AND FORMOTEROL FUMARATE DIHYDRATE 160; 4.5 UG/1; UG/1
2 AEROSOL RESPIRATORY (INHALATION)
Status: DISCONTINUED | OUTPATIENT
Start: 2020-10-05 | End: 2020-10-07 | Stop reason: HOSPADM

## 2020-10-05 RX ORDER — MONTELUKAST SODIUM 10 MG/1
10 TABLET ORAL
Status: DISCONTINUED | OUTPATIENT
Start: 2020-10-05 | End: 2020-10-07 | Stop reason: HOSPADM

## 2020-10-05 RX ORDER — TRIAMTERENE AND HYDROCHLOROTHIAZIDE 37.5; 25 MG/1; MG/1
1 CAPSULE ORAL DAILY
Status: DISCONTINUED | OUTPATIENT
Start: 2020-10-05 | End: 2020-10-07 | Stop reason: HOSPADM

## 2020-10-05 RX ORDER — NYSTATIN 100000 [USP'U]/ML
500000 SUSPENSION ORAL 4 TIMES DAILY
Status: DISCONTINUED | OUTPATIENT
Start: 2020-10-05 | End: 2020-10-07 | Stop reason: HOSPADM

## 2020-10-05 RX ADMIN — INSULIN LISPRO 2 UNITS: 100 INJECTION, SOLUTION INTRAVENOUS; SUBCUTANEOUS at 21:27

## 2020-10-05 RX ADMIN — INSULIN GLARGINE 15 UNITS: 100 INJECTION, SOLUTION SUBCUTANEOUS at 21:27

## 2020-10-05 RX ADMIN — NYSTATIN 500000 UNITS: 100000 SUSPENSION ORAL at 18:15

## 2020-10-05 RX ADMIN — MONTELUKAST 10 MG: 10 TABLET, FILM COATED ORAL at 21:26

## 2020-10-05 RX ADMIN — INSULIN GLARGINE 15 UNITS: 100 INJECTION, SOLUTION SUBCUTANEOUS at 02:15

## 2020-10-05 RX ADMIN — INSULIN LISPRO 7 UNITS: 100 INJECTION, SOLUTION INTRAVENOUS; SUBCUTANEOUS at 02:14

## 2020-10-05 RX ADMIN — SODIUM CHLORIDE 150 ML/HR: 9 INJECTION, SOLUTION INTRAVENOUS at 01:49

## 2020-10-05 RX ADMIN — ENOXAPARIN SODIUM 40 MG: 100 INJECTION SUBCUTANEOUS at 02:14

## 2020-10-05 RX ADMIN — INSULIN LISPRO 3 UNITS: 100 INJECTION, SOLUTION INTRAVENOUS; SUBCUTANEOUS at 16:00

## 2020-10-05 RX ADMIN — NYSTATIN 500000 UNITS: 100000 SUSPENSION ORAL at 21:27

## 2020-10-05 RX ADMIN — TRAZODONE HYDROCHLORIDE 100 MG: 50 TABLET ORAL at 21:26

## 2020-10-05 RX ADMIN — INSULIN LISPRO 4 UNITS: 100 INJECTION, SOLUTION INTRAVENOUS; SUBCUTANEOUS at 06:37

## 2020-10-05 RX ADMIN — INSULIN LISPRO 5 UNITS: 100 INJECTION, SOLUTION INTRAVENOUS; SUBCUTANEOUS at 12:02

## 2020-10-05 RX ADMIN — NYSTATIN 500000 UNITS: 100000 SUSPENSION ORAL at 01:50

## 2020-10-05 RX ADMIN — Medication 10 ML: at 16:59

## 2020-10-05 RX ADMIN — ACYCLOVIR 400 MG: 800 TABLET ORAL at 21:26

## 2020-10-05 RX ADMIN — SODIUM CHLORIDE 100 ML/HR: 9 INJECTION, SOLUTION INTRAVENOUS at 21:54

## 2020-10-05 RX ADMIN — SODIUM CHLORIDE 150 ML/HR: 9 INJECTION, SOLUTION INTRAVENOUS at 12:22

## 2020-10-05 RX ADMIN — Medication 10 ML: at 21:55

## 2020-10-05 NOTE — PROGRESS NOTES
Problem: Falls - Risk of  Goal: *Absence of Falls  Description: Document Leigh Miguel Fall Risk and appropriate interventions in the flowsheet.   Outcome: Progressing Towards Goal  Note: Fall Risk Interventions:            Medication Interventions: Bed/chair exit alarm         History of Falls Interventions: Bed/chair exit alarm         Problem: Patient Education: Go to Patient Education Activity  Goal: Patient/Family Education  Outcome: Progressing Towards Goal

## 2020-10-05 NOTE — ROUTINE PROCESS
TRANSFER - OUT REPORT:    Verbal report given to tasia(name) on Jamar Kinds  being transferred to [facility](unit) for routine progression of care       Report consisted of patients Situation, Background, Assessment and   Recommendations(SBAR). Information from the following report(s) SBAR was reviewed with the receiving nurse. Lines:   Peripheral IV 10/04/20 Right Antecubital (Active)        Opportunity for questions and clarification was provided.       Patient transported with:   Registered Nurse

## 2020-10-05 NOTE — ED PROVIDER NOTES
EMERGENCY DEPARTMENT HISTORY AND PHYSICAL EXAM      Date: 10/4/2020  Patient Name: Nathalie Harris    History of Presenting Illness     Chief Complaint   Patient presents with    Generalized Body Aches       History Provided By: Patient    HPI: Nathalie Harris, 54 y.o. female with a past medical history significant diabetes and HIV, HTN, COPD, HSV presents to the ED with cc of oral thrush, decreased taste, decreased smell, vaginal discharge, cough with occasional mucus production, decreased appetite, decreased energy. Patient reports symptoms been present for the past month. She reports being tested for COVID-19 September 10 with negative results. She reports being treated for thrush however has not completely resolved and complaints of dry mouth and tenderness to tongue. Patient does admit to having HIV unsure of last CD4 count managed by Dr. Amberly Darnell at [place] [address] she reports has not followed up. Reports concern for decline in her HIV status due to symptoms. Primary care Dr. Jennifer Gupta. There are no other complaints, changes, or physical findings at this time. PCP: Bettina Kawasaki, MD    Current Facility-Administered Medications   Medication Dose Route Frequency Provider Last Rate Last Dose    nystatin (MYCOSTATIN) 100,000 unit/mL oral suspension 500,000 Units  500,000 Units Oral ONCE Elvi Estrada, NP         Current Outpatient Medications   Medication Sig Dispense Refill    fluconazole (Diflucan) 150 mg tablet Take 1 Tab by mouth now for 1 dose. FDA advises cautious prescribing of oral fluconazole in pregnancy. Indications: a yeast infection of the vagina and vulva 1 Tab 1    nystatin (MYCOSTATIN) 100,000 unit/mL suspension Take 5 mL by mouth four (4) times daily.  swish and spit 60 mL 1    loperamide (IMODIUM) 2 mg capsule Take 2 capsules initially for a total dose of mgs and then one capsule (2mgs) after each loose stool not to exceed 8 capsules a day 40 Cap 0    acyclovir (ZOVIRAX) 400 mg tablet Take 1 Tab by mouth as needed.  Biktarvy tab tablet Take 1 Tab by mouth daily.  albuterol (PROVENTIL HFA, VENTOLIN HFA, PROAIR HFA) 90 mcg/actuation inhaler Take 2 Puffs by inhalation as needed.  Symbicort 160-4.5 mcg/actuation HFAA Take 2 Puffs by inhalation every twelve (12) hours as needed.  montelukast (SINGULAIR) 10 mg tablet Take 1 Tab by mouth daily.  Spectravite Adult 50 Plus 0.4-300-250 mg-mcg-mcg tab Take 1 Tab by mouth daily.  omeprazole (PRILOSEC) 40 mg capsule Take 1 Cap by mouth daily.  sertraline (ZOLOFT) 50 mg tablet Take 1 Tab by mouth daily.  traZODone (DESYREL) 100 mg tablet Take 1 Tab by mouth daily.  triamterene-hydroCHLOROthiazide (DYAZIDE) 37.5-25 mg per capsule Take 1 Cap by mouth daily.  aspirin delayed-release 81 mg tablet Take 1 Tab by mouth daily.  zolpidem (AMBIEN) 5 mg tablet Take 1 Tab by mouth nightly as needed.  fluticasone propionate (FLONASE) 50 mcg/actuation nasal spray 2 Sprays by Both Nostrils route daily.  gabapentin (NEURONTIN) 600 mg tablet Take 1 Tab by mouth three (3) times daily.  gentamicin (GARAMYCIN) 0.3 % ophthalmic solution Administer 1 Drop to left eye every four (4) hours. For 5 days .  1 Bottle 0       Past History     Past Medical History:  Past Medical History:   Diagnosis Date    Abnormal weight gain 9/9/2020    Asthma     Cellulitis and abscess of lower extremity 9/9/2020    Contact dermatitis and eczema due to cause     COPD (chronic obstructive pulmonary disease) ([address] 9/9/2020    Depression     Depressive disorder 9/9/2020    Diabetes [facility]     DM (diabetes mellitus), type 2 ([address] 9/9/2020    Dyslipidemia 9/9/2020    Dysphagia 9/9/2020    Essential hypertension 9/9/2020    GERD (gastroesophageal reflux disease)     Headache     HIV (human immunodeficiency virus infection) ([facility]     Hypercholesterolemia     Hypertension     Insomnia 9/9/2020    Mononeuritis 9/9/2020    Neck pain 9/9/2020    Noncompliance with treatment 9/9/2020    Pain in throat 9/9/2020    TIA (transient ischemic attack) 9/9/2020    TIA (transient ischemic attack) 9/9/2020    Tick bite 9/9/2020    Vitamin D deficiency 9/9/2020       Past Surgical History:  Past Surgical History:   Procedure Laterality Date    HX APPENDECTOMY      HX APPENDECTOMY  2012    HX COLONOSCOPY      HX COLONOSCOPY  06/25/2017    HX GI      HX GYN      HX TOTAL LAPAROSCOPIC HYSTERECTOMY  2013       Family History:  Family History   Problem Relation Age of Onset    Hypertension Mother     Thyroid Disease Mother     Heart Disease Father     Hypertension Father     Diabetes Father     Diabetes Maternal Grandmother        Social History:  Social History     Tobacco Use    Smoking status: Former Smoker    Smokeless tobacco: Never Used    Tobacco comment: QUIT 4 MONTHS AG0   Substance Use Topics    Alcohol use: Not Currently    Drug use: Never       Allergies: Allergies   Allergen Reactions    Lisinopril Other (comments)     PT.  States causes swelling lf the throat    Bactrim [Sulfamethoprim] Hives    Elavil Hives    Iron Nausea and Vomiting    Lyrica [Pregabalin] Hives           Review of Systems   Constitutional: Positive for appetite change and fatigue. Negative for chills, fever and unexpected weight change. HENT:        Dry mouth, decrease taste, decreased smell   Respiratory: Negative. Cardiovascular: Negative. Gastrointestinal: Positive for nausea and vomiting. Genitourinary: Positive for vaginal discharge. Itching   Skin: Negative. Allergic/Immunologic: Positive for immunocompromised state. Physical Exam     Physical Exam  Vitals signs reviewed. Exam conducted with a chaperone present. Constitutional:       Appearance: Normal appearance. HENT:      Head: Normocephalic and atraumatic. Nose: Nose normal.      Mouth/Throat:      Mouth: Mucous membranes are dry. Tongue: No lesions. Eyes:      Extraocular Movements: Extraocular movements intact. Neck:      Musculoskeletal: Normal range of motion and neck supple. Cardiovascular:      Rate and Rhythm: Tachycardia present. Pulses: Normal pulses. Heart sounds: Normal heart sounds. Pulmonary:      Effort: Pulmonary effort is normal.      Breath sounds: Normal breath sounds. Abdominal:      General: Abdomen is flat. Bowel sounds are normal.      Palpations: Abdomen is soft. Genitourinary:     Labia:         Left: Rash present. No lesion. Skin:     General: Skin is warm and dry. Capillary Refill: Capillary refill takes less than 2 seconds. Neurological:      Mental Status: She is alert and oriented to person, place, and time. Psychiatric:         Mood and Affect: Mood normal.         Speech: Speech normal.         Behavior: Behavior normal.         Cognition and Memory: Cognition normal.         Diagnostic Study Results     Labs -     Recent Results (from the past 12 hour(s))   CBC WITH AUTOMATED DIFF    Collection Time: 10/04/20  9:25 PM   Result Value Ref Range    WBC 7.3 3.6 - 11.0 K/uL    RBC 4.53 3.80 - 5.20 M/uL    HGB 14.0 11.5 - 16.0 g/dL    HCT 40.5 35.0 - 47.0 %    MCV 89.4 80.0 - 99.0 FL    MCH 30.9 26.0 - 34.0 PG    MCHC 34.6 30.0 - 36.5 g/dL    RDW 12.8 11.5 - 14.5 %    PLATELET 837 560 - 740 K/uL    MPV 14.0 (H) 8.9 - 12.9 FL    NEUTROPHILS 68 32 - 75 %    LYMPHOCYTES 27 12 - 49 %    MONOCYTES 5 5 - 13 %    EOSINOPHILS 0 0 - 7 %    BASOPHILS 0 0 - 1 %    IMMATURE GRANULOCYTES 0 0.0 - 0.5 %    ABS. NEUTROPHILS 4.9 1.8 - 8.0 K/UL    ABS. LYMPHOCYTES 2.0 0.8 - 3.5 K/UL    ABS. MONOCYTES 0.4 0.0 - 1.0 K/UL    ABS. EOSINOPHILS 0.0 0.0 - 0.4 K/UL    ABS. BASOPHILS 0.0 0.0 - 0.1 K/UL    ABS. IMM.  GRANS. 0.0 0.00 - 0.04 K/UL    DF AUTOMATED     METABOLIC PANEL, BASIC    Collection Time: 10/04/20  9:25 PM   Result Value Ref Range    Sodium 120 (L) 136 - 145 mmol/L    Potassium 5.4 (H) 3.5 - 5.1 mmol/L    Chloride 82 (L) 97 - 108 mmol/L    CO2 29 21 - 32 mmol/L    Anion gap 9 5 - 15 mmol/L    Glucose 987 (HH) 65 - 100 mg/dL    BUN 20 6 - 20 mg/dL    Creatinine 1.87 (H) 0.55 - 1.02 mg/dL    BUN/Creatinine ratio 11 (L) 12 - 20      GFR est AA 34 (L) >60 ml/min/1.73m2    GFR est non-AA 28 (L) >60 ml/min/1.73m2    Calcium 10.4 (H) 8.5 - 10.1 mg/dL   URINALYSIS W/ REFLEX CULTURE    Collection Time: 10/04/20  9:25 PM    Specimen: Urine   Result Value Ref Range    Color Yellow/Straw      Appearance Clear Clear      Specific gravity 1.022 1.003 - 1.030      pH (UA) 6.0 5.0 - 8.0      Protein Negative Negative mg/dL    Glucose >300 (A) Negative mg/dL    Ketone Negative Negative mg/dL    Bilirubin Negative Negative      Blood Negative Negative      Urobilinogen 0.1 0.1 - 1.0 EU/dL    Nitrites Negative Negative      Leukocyte Esterase Negative Negative      UA:UC IF INDICATED Culture not indicated by UA result Culture not indicated by UA result      WBC 0-4 0 - 4 /hpf    RBC 0-5 0 - 5 /hpf    Bacteria Negative Negative /hpf    Mucus Trace /lpf       Radiologic Studies -   [unfilled]  CT Results  (Last 48 hours)    None        CXR Results  (Last 48 hours)               10/04/20 2205  XR CHEST PORT Final result    Impression:  Impression:   No acute findings. Narrative:  Study: XR CHEST PORT       Clinical indication: SEPSIS       Comparison: Chest x-ray 7/15/2013. Findings:       No consolidative airspace disease, pleural effusion or pneumothorax. Cardiomediastinal contours are within normal limits. No pulmonary edema. No acute osseous abnormality identified. Medical Decision Making and ED Course   I am the first provider for this patient. I reviewed the vital signs, available nursing notes, past medical history, past surgical history, family history and social history. Vital Signs-Reviewed the patient's vital signs.   Patient Vitals for the past 12 hrs:   Temp Pulse Resp BP SpO2   10/04/20 2235 -- (!) 104 17 (!) 120/91 99 %   10/04/20 2141 -- (!) 102 17 116/85 98 %   10/04/20 2110 -- -- -- -- 98 %   10/04/20 1926 98.8 °F (37.1 °C) (!) 118 18 (!) 138/110 100 %         ED Course:   Initial assessment performed. The patients presenting problems have been discussed, and they are in agreement with the care plan formulated and outlined with them. I have encouraged them to ask questions as they arise throughout their visit. ED Course as of Oct 04 2309   Isabella Proctor Oct 04, 2020   2215 Patient's blood sugar 987 according to glucose serum. Per Dr. Mariela Blackburn Pt treated with 10 units of IV insulin and call to hospitalist services.     [AM]   0699 [id number] Patient updated on lab results advised of plan to admit due to hyperglycemia. Patient verbalized understanding currently stable at this time admitted under hospitalist [facility] services.    [AM]      ED Course User Index  [AM] Kathryn Rodarte, MARY KAY         Procedures       Willie Langston, MARY KAY Langston, MARY KAY        Disposition     1. Current Discharge Medication List      CONTINUE these medications which have NOT CHANGED    Details   nystatin (MYCOSTATIN) 100,000 unit/mL suspension Take 5 mL by mouth four (4) times daily. swish and spit  Qty: 60 mL, Refills: 1      loperamide (IMODIUM) 2 mg capsule Take 2 capsules initially for a total dose of mgs and then one capsule (2mgs) after each loose stool not to exceed 8 capsules a day  Qty: 40 Cap, Refills: 0      acyclovir (ZOVIRAX) 400 mg tablet Take 1 Tab by mouth as needed. Biktarvy tab tablet Take 1 Tab by mouth daily. albuterol (PROVENTIL HFA, VENTOLIN HFA, PROAIR HFA) 90 mcg/actuation inhaler Take 2 Puffs by inhalation as needed. Symbicort 160-4.5 mcg/actuation HFAA Take 2 Puffs by inhalation every twelve (12) hours as needed. montelukast (SINGULAIR) 10 mg tablet Take 1 Tab by mouth daily.       Spectravite Adult 50 Plus 0.4-300-250 mg-mcg-mcg tab Take 1 Tab by mouth daily. omeprazole (PRILOSEC) 40 mg capsule Take 1 Cap by mouth daily. sertraline (ZOLOFT) 50 mg tablet Take 1 Tab by mouth daily. traZODone (DESYREL) 100 mg tablet Take 1 Tab by mouth daily. triamterene-hydroCHLOROthiazide (DYAZIDE) 37.5-25 mg per capsule Take 1 Cap by mouth daily. aspirin delayed-release 81 mg tablet Take 1 Tab by mouth daily. zolpidem (AMBIEN) 5 mg tablet Take 1 Tab by mouth nightly as needed. fluticasone propionate (FLONASE) 50 mcg/actuation nasal spray 2 Sprays by Both Nostrils route daily. gabapentin (NEURONTIN) 600 mg tablet Take 1 Tab by mouth three (3) times daily. gentamicin (GARAMYCIN) 0.3 % ophthalmic solution Administer 1 Drop to left eye every four (4) hours. For 5 days . Qty: 1 Bottle, Refills: 0           2. Follow-up Information     Follow up With Specialties Details Why Contact Info    Enid Chapman MD Internal Medicine Schedule an appointment as soon as possible for a visit in 1 day blood sugar management [address] [facility],[address], [facility]  [id number] [place] [address], [address] [place] [address] Internal Medicine Schedule an appointment as soon as possible for a visit in 1 day HIV management [address] [facility] [address] [phone]          3. Return to ED if worse     Diagnosis     Clinical Impression:   1. Vaginal candida    2. HIV infection, unspecified symptom status ([address]    3. Oral thrush    4. Encounter for screening laboratory testing for COVID-19 virus    5. Hyperglycemia        Attestations:    Santhosh Tineo NP    Please note that this dictation was completed with HALKAR, the [employer] voice recognition software. Quite often unanticipated grammatical, syntax, homophones, and other interpretive errors are inadvertently transcribed by the computer software. Please disregard these errors. Please excuse any errors that have escaped final proofreading. Thank you.

## 2020-10-05 NOTE — ED NOTES
Report to [address]. Medication from pharmacy from earlier tonight requested again as not received.

## 2020-10-05 NOTE — PROGRESS NOTES
Pt arrived to room 414 on stretcher from ED.   Transfer to bed and position for comfort  No c/o voiced

## 2020-10-05 NOTE — PROGRESS NOTES
Hospitalist Progress Note    Subjective:   Daily Progress Note: 10/5/2020 2:18 PM    Hospital Course: This is a 42-year-old -American female with a history of diabetes, HIV, hypertension, and COPD who presented to the emergency department complaining of decreased taste and oral thrush. She also reports associated generalized weakness, fatigue, cough, vaginal discharge from yeast infection. She reports the symptoms have been present for about a month and have shown no improvement. The patient was tested for Cobin on September 10 that was negative she was taking Diflucan for thrush, but this has not resolved. She admits to noncompliance with her medications. In the ED she was found to have elevated blood sugar levels >900 with hyperosmolarity significant with electrolyte abnormalities. She was given 10 units of IV insulin in the ED and started on insulin drip and Lantus 10 units. Blood sugar levels have shown improvement. She is on droplet precautions for COVID-19 rule out. Chest x-ray showed no acute cardiopulmonary infiltrates. Hemoglobin A1c pending. Subjective:    Patient seen and examined at bedside in the ED. She is complaining of loss of taste, nausea, generalized fatigue, weakness, and bilateral calf cramps. She denies dizziness, chest pain, palpitations, shortness of breath, or extremity edema.     Current Facility-Administered Medications   Medication Dose Route Frequency    sodium chloride (NS) flush 5-40 mL  5-40 mL IntraVENous Q8H    sodium chloride (NS) flush 5-40 mL  5-40 mL IntraVENous PRN    ELECTROLYTE REPLACEMENT PROTOCOL - Potassium  1 Each Other PRN    0.9% sodium chloride infusion  150 mL/hr IntraVENous CONTINUOUS    enoxaparin (LOVENOX) injection 40 mg  40 mg SubCUTAneous Q24H    insulin lispro (HUMALOG) injection   SubCUTAneous Q4H    glucose chewable tablet 16 g  4 Tab Oral PRN    dextrose (D50W) injection syrg 12.5-25 g  25-50 mL IntraVENous PRN    glucagon (GLUCAGEN) injection 1 mg  1 mg IntraMUSCular PRN    insulin glargine (LANTUS) injection 15 Units  0.2 Units/kg SubCUTAneous QHS     Current Outpatient Medications   Medication Sig    nystatin (MYCOSTATIN) 100,000 unit/mL suspension Take 5 mL by mouth four (4) times daily. swish and spit    loperamide (IMODIUM) 2 mg capsule Take 2 capsules initially for a total dose of mgs and then one capsule (2mgs) after each loose stool not to exceed 8 capsules a day    acyclovir (ZOVIRAX) 400 mg tablet Take 1 Tab by mouth as needed.  Biktarvy tab tablet Take 1 Tab by mouth daily.  albuterol (PROVENTIL HFA, VENTOLIN HFA, PROAIR HFA) 90 mcg/actuation inhaler Take 2 Puffs by inhalation as needed.  Symbicort 160-4.5 mcg/actuation HFAA Take 2 Puffs by inhalation every twelve (12) hours as needed.  montelukast (SINGULAIR) 10 mg tablet Take 1 Tab by mouth daily.  Spectravite Adult 50 Plus 0.4-300-250 mg-mcg-mcg tab Take 1 Tab by mouth daily.  omeprazole (PRILOSEC) 40 mg capsule Take 1 Cap by mouth daily.  sertraline (ZOLOFT) 50 mg tablet Take 1 Tab by mouth daily.  traZODone (DESYREL) 100 mg tablet Take 1 Tab by mouth daily.  triamterene-hydroCHLOROthiazide (DYAZIDE) 37.5-25 mg per capsule Take 1 Cap by mouth daily.  aspirin delayed-release 81 mg tablet Take 1 Tab by mouth daily.  zolpidem (AMBIEN) 5 mg tablet Take 1 Tab by mouth nightly as needed.  fluticasone propionate (FLONASE) 50 mcg/actuation nasal spray 2 Sprays by Both Nostrils route daily.  gabapentin (NEURONTIN) 600 mg tablet Take 1 Tab by mouth three (3) times daily.  gentamicin (GARAMYCIN) 0.3 % ophthalmic solution Administer 1 Drop to left eye every four (4) hours. For 5 days .         Review of Systems  Constitutional: No fevers, No chills, No sweats, fatigue, weakness, loss of taste  Eyes: No visual disturbances  ENT: Thrush, No sore throat, No voice change  Respiratory: No Shortness of Breath, cough, No wheezing  Cardiovascular: No chest pain, No palpitations, No extremity edema  Gastrointestinal: nausea, No vomiting, No diarrhea, No abdominal pain  Genitourinary: dysuria, vaginal discharge, No hematuria  Integument/breast: No skin lesions   Neurological: No headaches, No dizziness      Objective:     Visit Vitals  /88   Pulse 92   Temp 98.8 °F (37.1 °C)   Resp 18   Ht 5' 3\" (1.6 m)   Wt 73 kg (161 lb)   SpO2 100%   BMI 28.52 kg/m²      O2 Device: Room air    Temp (24hrs), Av.8 °F (37.1 °C), Min:98.8 °F (37.1 °C), Max:98.8 °F (37.1 °C)      No intake/output data recorded. 10/03 1901 - 10/05 0700  In: -   Out: 450 [Urine:450]    PHYSICAL EXAM:  Constitutional: Middle-aged AA female. No acute distress  Skin: Extremities and face reveal no rashes. HEENT: Sclerae anicteric. PERRL. White coating on tongue. The neck is supple and no masses. No JVD. Cardiovascular: Regular rate and rhythm. Normal S1/S2. No murmur appreciated. Respiratory:  Clear breath sounds bilaterally with no wheezes, rales, or rhonchi. GI: Abdomen nondistended, soft, and nontender. Normal active bowel sounds. Rectal: Deferred   Musculoskeletal: No pitting edema of the lower legs. Able to move all ext  Neurological:  Patient is alert and oriented.  Cranial nerves II-XII grossly intact  Psychiatric: Mood appears appropriate       Data Review    Recent Results (from the past 24 hour(s))   SARS-COV-2    Collection Time: 10/04/20  9:00 PM   Result Value Ref Range    SARS-CoV-2 Please find results under separate order     CBC WITH AUTOMATED DIFF    Collection Time: 10/04/20  9:25 PM   Result Value Ref Range    WBC 7.3 3.6 - 11.0 K/uL    RBC 4.53 3.80 - 5.20 M/uL    HGB 14.0 11.5 - 16.0 g/dL    HCT 40.5 35.0 - 47.0 %    MCV 89.4 80.0 - 99.0 FL    MCH 30.9 26.0 - 34.0 PG    MCHC 34.6 30.0 - 36.5 g/dL    RDW 12.8 11.5 - 14.5 %    PLATELET 919 502 - 961 K/uL    MPV 14.0 (H) 8.9 - 12.9 FL    NEUTROPHILS 68 32 - 75 %    LYMPHOCYTES 27 12 - 49 % MONOCYTES 5 5 - 13 %    EOSINOPHILS 0 0 - 7 %    BASOPHILS 0 0 - 1 %    IMMATURE GRANULOCYTES 0 0.0 - 0.5 %    ABS. NEUTROPHILS 4.9 1.8 - 8.0 K/UL    ABS. LYMPHOCYTES 2.0 0.8 - 3.5 K/UL    ABS. MONOCYTES 0.4 0.0 - 1.0 K/UL    ABS. EOSINOPHILS 0.0 0.0 - 0.4 K/UL    ABS. BASOPHILS 0.0 0.0 - 0.1 K/UL    ABS. IMM.  GRANS. 0.0 0.00 - 0.04 K/UL    DF AUTOMATED     METABOLIC PANEL, BASIC    Collection Time: 10/04/20  9:25 PM   Result Value Ref Range    Sodium 120 (L) 136 - 145 mmol/L    Potassium 5.4 (H) 3.5 - 5.1 mmol/L    Chloride 82 (L) 97 - 108 mmol/L    CO2 29 21 - 32 mmol/L    Anion gap 9 5 - 15 mmol/L    Glucose 987 (HH) 65 - 100 mg/dL    BUN 20 6 - 20 mg/dL    Creatinine 1.87 (H) 0.55 - 1.02 mg/dL    BUN/Creatinine ratio 11 (L) 12 - 20      GFR est AA 34 (L) >60 ml/min/1.73m2    GFR est non-AA 28 (L) >60 ml/min/1.73m2    Calcium 10.4 (H) 8.5 - 10.1 mg/dL   URINALYSIS W/ REFLEX CULTURE    Collection Time: 10/04/20  9:25 PM    Specimen: Urine   Result Value Ref Range    Color Yellow/Straw      Appearance Clear Clear      Specific gravity 1.022 1.003 - 1.030      pH (UA) 6.0 5.0 - 8.0      Protein Negative Negative mg/dL    Glucose >300 (A) Negative mg/dL    Ketone Negative Negative mg/dL    Bilirubin Negative Negative      Blood Negative Negative      Urobilinogen 0.1 0.1 - 1.0 EU/dL    Nitrites Negative Negative      Leukocyte Esterase Negative Negative      UA:UC IF INDICATED Culture not indicated by UA result Culture not indicated by UA result      WBC 0-4 0 - 4 /hpf    RBC 0-5 0 - 5 /hpf    Bacteria Negative Negative /hpf    Mucus Trace /lpf   GLUCOSE, POC    Collection Time: 10/05/20  6:28 AM   Result Value Ref Range    Glucose (POC) 304 (H) 65 - 100 mg/dL    Performed by ISINTA MARIA LUISA    CBC WITH AUTOMATED DIFF    Collection Time: 10/05/20  6:40 AM   Result Value Ref Range    WBC 7.1 3.6 - 11.0 K/uL    RBC 4.21 3.80 - 5.20 M/uL    HGB 12.7 11.5 - 16.0 g/dL    HCT 35.9 35.0 - 47.0 %    MCV 85.3 80.0 - 99.0 FL MCH 30.2 26.0 - 34.0 PG    MCHC 35.4 30.0 - 36.5 g/dL    RDW 12.6 11.5 - 14.5 %    PLATELET 356 861 - 185 K/uL    MPV 12.8 8.9 - 12.9 FL    NEUTROPHILS 43 32 - 75 %    LYMPHOCYTES 45 12 - 49 %    MONOCYTES 11 5 - 13 %    EOSINOPHILS 1 0 - 7 %    BASOPHILS 0 0 - 1 %    IMMATURE GRANULOCYTES 0 0.0 - 0.5 %    ABS. NEUTROPHILS 3.0 1.8 - 8.0 K/UL    ABS. LYMPHOCYTES 3.2 0.8 - 3.5 K/UL    ABS. MONOCYTES 0.8 0.0 - 1.0 K/UL    ABS. EOSINOPHILS 0.1 0.0 - 0.4 K/UL    ABS. BASOPHILS 0.0 0.0 - 0.1 K/UL    ABS. IMM. GRANS. 0.0 0.00 - 0.04 K/UL    DF AUTOMATED     METABOLIC PANEL, COMPREHENSIVE    Collection Time: 10/05/20  6:40 AM   Result Value Ref Range    Sodium 141 136 - 145 mmol/L    Potassium 3.8 3.5 - 5.1 mmol/L    Chloride 106 97 - 108 mmol/L    CO2 31 21 - 32 mmol/L    Anion gap 4 (L) 5 - 15 mmol/L    Glucose 276 (H) 65 - 100 mg/dL    BUN 13 6 - 20 mg/dL    Creatinine 1.19 (H) 0.55 - 1.02 mg/dL    BUN/Creatinine ratio 11 (L) 12 - 20      GFR est AA 57 (L) >60 ml/min/1.73m2    GFR est non-AA 47 (L) >60 ml/min/1.73m2    Calcium 9.1 8.5 - 10.1 mg/dL    Bilirubin, total 0.7 0.2 - 1.0 mg/dL    AST (SGOT) 21 15 - 37 U/L    ALT (SGPT) 26 12 - 78 U/L    Alk. phosphatase 95 45 - 117 U/L    Protein, total 7.8 6.4 - 8.2 g/dL    Albumin 3.4 (L) 3.5 - 5.0 g/dL    Globulin 4.4 (H) 2.0 - 4.0 g/dL    A-G Ratio 0.8 (L) 1.1 - 2.2     GLUCOSE, POC    Collection Time: 10/05/20 10:39 AM   Result Value Ref Range    Glucose (POC) 237 (H) 65 - 100 mg/dL    Performed by Anne Callahan        XR CHEST PORT   Final Result   Impression:   No acute findings. Active Problems:    Type 2 diabetes mellitus with hyperosmolar nonketotic hyperglycemia ([facility] [address] (10/4/2020)        Assessment/Plan:     Assessment:  1. Hyperosmolar hyperglycemic state, type 2 diabetes mellitus  2. Severe dehydration  3. Hyponatremia  4. HIV  5.  COPD    Plan:   1.   Hyperosmolar hyperglycemic state, type 2 diabetes mellitus  Patient states she was taken off three different diabetic medications 1-yr-ago. Suspect  Noncompliance. HgbA1c pending  Continue on ISS and Lantus 10 units  Will likely be able to start oral medication tomorrow  Continue accu-checks    2. Severe dehydration  She received 1 L IVF bolus in ED. Started on continuous IVFs. 3.  Hyponatremia, w/ mild hyperkalemia  Resolved with IV rehydration. Will decrease IV fluid rate to 100 mL/hr. Monitor electrolytes. 4.  HIV  Unclear which medications she is on. Pharmacy consult to continue these medications. 5.  COPD  Compensated. Continue home medications. * Droplet/contact precautions for COVID-19 rule out. Low suspicion. CXR clear. Afebrile. DVT Prophylaxis: Lovenox   Code Status: Full      Care Plan discussed with Patient   Patient states she has a follow up appointment with her PCP next week. Total time spent with patient: >35 minutes.

## 2020-10-06 LAB
ALBUMIN SERPL-MCNC: 3 G/DL (ref 3.5–5)
ALBUMIN/GLOB SERPL: 0.8 {RATIO} (ref 1.1–2.2)
ALP SERPL-CCNC: 82 U/L (ref 45–117)
ALT SERPL-CCNC: 32 U/L (ref 12–78)
ANION GAP SERPL CALC-SCNC: 6 MMOL/L (ref 5–15)
AST SERPL W P-5'-P-CCNC: 28 U/L (ref 15–37)
BASOPHILS # BLD: 0 K/UL (ref 0–0.1)
BASOPHILS NFR BLD: 1 % (ref 0–1)
BILIRUB SERPL-MCNC: 0.4 MG/DL (ref 0.2–1)
BUN SERPL-MCNC: 8 MG/DL (ref 6–20)
BUN/CREAT SERPL: 8 (ref 12–20)
CA-I BLD-MCNC: 8.6 MG/DL (ref 8.5–10.1)
CHLORIDE SERPL-SCNC: 105 MMOL/L (ref 97–108)
CO2 SERPL-SCNC: 28 MMOL/L (ref 21–32)
CREAT SERPL-MCNC: 1.02 MG/DL (ref 0.55–1.02)
DIFFERENTIAL METHOD BLD: ABNORMAL
EOSINOPHIL # BLD: 0 K/UL (ref 0–0.4)
EOSINOPHIL NFR BLD: 1 % (ref 0–7)
ERYTHROCYTE [DISTWIDTH] IN BLOOD BY AUTOMATED COUNT: 12.8 % (ref 11.5–14.5)
EST. AVERAGE GLUCOSE BLD GHB EST-MCNC: 332 MG/DL
GLOBULIN SER CALC-MCNC: 3.8 G/DL (ref 2–4)
GLUCOSE BLD STRIP.AUTO-MCNC: 173 MG/DL (ref 65–100)
GLUCOSE BLD STRIP.AUTO-MCNC: 258 MG/DL (ref 65–100)
GLUCOSE BLD STRIP.AUTO-MCNC: 266 MG/DL (ref 65–100)
GLUCOSE BLD STRIP.AUTO-MCNC: 277 MG/DL (ref 65–100)
GLUCOSE BLD STRIP.AUTO-MCNC: 370 MG/DL (ref 65–100)
GLUCOSE BLD STRIP.AUTO-MCNC: 456 MG/DL (ref 65–100)
GLUCOSE SERPL-MCNC: 237 MG/DL (ref 65–100)
HBA1C MFR BLD: 13.2 % (ref 4–5.6)
HCT VFR BLD AUTO: 32.6 % (ref 35–47)
HGB BLD-MCNC: 11.2 G/DL (ref 11.5–16)
IMM GRANULOCYTES # BLD AUTO: 0 K/UL (ref 0–0.04)
IMM GRANULOCYTES NFR BLD AUTO: 0 % (ref 0–0.5)
LYMPHOCYTES # BLD: 2.1 K/UL (ref 0.8–3.5)
LYMPHOCYTES NFR BLD: 49 % (ref 12–49)
MCH RBC QN AUTO: 29.9 PG (ref 26–34)
MCHC RBC AUTO-ENTMCNC: 34.4 G/DL (ref 30–36.5)
MCV RBC AUTO: 86.9 FL (ref 80–99)
MONOCYTES # BLD: 0.3 K/UL (ref 0–1)
MONOCYTES NFR BLD: 7 % (ref 5–13)
NEUTS SEG # BLD: 1.8 K/UL (ref 1.8–8)
NEUTS SEG NFR BLD: 42 % (ref 32–75)
PERFORMED BY, TECHID: ABNORMAL
PLATELET # BLD AUTO: 191 K/UL (ref 150–400)
PMV BLD AUTO: 12.7 FL (ref 8.9–12.9)
POTASSIUM SERPL-SCNC: 3.4 MMOL/L (ref 3.5–5.1)
PROT SERPL-MCNC: 6.8 G/DL (ref 6.4–8.2)
RBC # BLD AUTO: 3.75 M/UL (ref 3.8–5.2)
SARS-COV-2, COV2NT: NOT DETECTED
SODIUM SERPL-SCNC: 139 MMOL/L (ref 136–145)
WBC # BLD AUTO: 4.3 K/UL (ref 3.6–11)

## 2020-10-06 PROCEDURE — 74011250637 HC RX REV CODE- 250/637: Performed by: HOSPITALIST

## 2020-10-06 PROCEDURE — 74011636637 HC RX REV CODE- 636/637: Performed by: FAMILY MEDICINE

## 2020-10-06 PROCEDURE — 74011636637 HC RX REV CODE- 636/637: Performed by: HOSPITALIST

## 2020-10-06 PROCEDURE — 82962 GLUCOSE BLOOD TEST: CPT

## 2020-10-06 PROCEDURE — 85025 COMPLETE CBC W/AUTO DIFF WBC: CPT

## 2020-10-06 PROCEDURE — 36415 COLL VENOUS BLD VENIPUNCTURE: CPT

## 2020-10-06 PROCEDURE — 74011250636 HC RX REV CODE- 250/636: Performed by: INTERNAL MEDICINE

## 2020-10-06 PROCEDURE — 65270000029 HC RM PRIVATE

## 2020-10-06 PROCEDURE — 74011250636 HC RX REV CODE- 250/636: Performed by: HOSPITALIST

## 2020-10-06 PROCEDURE — 80053 COMPREHEN METABOLIC PANEL: CPT

## 2020-10-06 PROCEDURE — 74011250636 HC RX REV CODE- 250/636: Performed by: STUDENT IN AN ORGANIZED HEALTH CARE EDUCATION/TRAINING PROGRAM

## 2020-10-06 RX ORDER — INSULIN GLARGINE 100 [IU]/ML
20 INJECTION, SOLUTION SUBCUTANEOUS ONCE
Status: COMPLETED | OUTPATIENT
Start: 2020-10-06 | End: 2020-10-06

## 2020-10-06 RX ORDER — ONDANSETRON 2 MG/ML
4 INJECTION INTRAMUSCULAR; INTRAVENOUS
Status: DISCONTINUED | OUTPATIENT
Start: 2020-10-06 | End: 2020-10-07 | Stop reason: HOSPADM

## 2020-10-06 RX ORDER — INSULIN LISPRO 100 [IU]/ML
INJECTION, SOLUTION INTRAVENOUS; SUBCUTANEOUS EVERY 6 HOURS
Status: DISCONTINUED | OUTPATIENT
Start: 2020-10-06 | End: 2020-10-07 | Stop reason: HOSPADM

## 2020-10-06 RX ORDER — INSULIN LISPRO 100 [IU]/ML
INJECTION, SOLUTION INTRAVENOUS; SUBCUTANEOUS EVERY 6 HOURS
Status: DISCONTINUED | OUTPATIENT
Start: 2020-10-07 | End: 2020-10-06

## 2020-10-06 RX ORDER — DEXTROSE 50 % IN WATER (D50W) INTRAVENOUS SYRINGE
25-50 AS NEEDED
Status: DISCONTINUED | OUTPATIENT
Start: 2020-10-06 | End: 2020-10-07 | Stop reason: HOSPADM

## 2020-10-06 RX ADMIN — NYSTATIN 500000 UNITS: 100000 SUSPENSION ORAL at 12:07

## 2020-10-06 RX ADMIN — INSULIN LISPRO 4 UNITS: 100 INJECTION, SOLUTION INTRAVENOUS; SUBCUTANEOUS at 00:00

## 2020-10-06 RX ADMIN — MULTIVITAMIN TABLET 1 TABLET: TABLET at 08:28

## 2020-10-06 RX ADMIN — ACYCLOVIR 400 MG: 800 TABLET ORAL at 21:36

## 2020-10-06 RX ADMIN — BICTEGRAVIR SODIUM, EMTRICITABINE, AND TENOFOVIR ALAFENAMIDE FUMARATE 1 TABLET: 50; 200; 25 TABLET ORAL at 09:00

## 2020-10-06 RX ADMIN — NYSTATIN 500000 UNITS: 100000 SUSPENSION ORAL at 08:27

## 2020-10-06 RX ADMIN — ENOXAPARIN SODIUM 40 MG: 100 INJECTION SUBCUTANEOUS at 01:23

## 2020-10-06 RX ADMIN — SERTRALINE HYDROCHLORIDE 50 MG: 50 TABLET ORAL at 08:28

## 2020-10-06 RX ADMIN — ACYCLOVIR 400 MG: 800 TABLET ORAL at 08:28

## 2020-10-06 RX ADMIN — ONDANSETRON 4 MG: 2 INJECTION INTRAMUSCULAR; INTRAVENOUS at 16:53

## 2020-10-06 RX ADMIN — NYSTATIN 500000 UNITS: 100000 SUSPENSION ORAL at 21:35

## 2020-10-06 RX ADMIN — TRIAMTERENE AND HYDROCHLOROTHIAZIDE 1 CAPSULE: 37.5; 25 CAPSULE ORAL at 09:00

## 2020-10-06 RX ADMIN — INSULIN LISPRO 5 UNITS: 100 INJECTION, SOLUTION INTRAVENOUS; SUBCUTANEOUS at 04:00

## 2020-10-06 RX ADMIN — NYSTATIN 500000 UNITS: 100000 SUSPENSION ORAL at 17:32

## 2020-10-06 RX ADMIN — INSULIN LISPRO 5 UNITS: 100 INJECTION, SOLUTION INTRAVENOUS; SUBCUTANEOUS at 16:00

## 2020-10-06 RX ADMIN — INSULIN LISPRO 2 UNITS: 100 INJECTION, SOLUTION INTRAVENOUS; SUBCUTANEOUS at 08:00

## 2020-10-06 RX ADMIN — SODIUM CHLORIDE 75 ML/HR: 9 INJECTION, SOLUTION INTRAVENOUS at 16:57

## 2020-10-06 RX ADMIN — MONTELUKAST 10 MG: 10 TABLET, FILM COATED ORAL at 21:35

## 2020-10-06 RX ADMIN — INSULIN GLARGINE 20 UNITS: 100 INJECTION, SOLUTION SUBCUTANEOUS at 21:00

## 2020-10-06 RX ADMIN — Medication 10 ML: at 14:21

## 2020-10-06 RX ADMIN — Medication 10 ML: at 21:47

## 2020-10-06 RX ADMIN — INSULIN LISPRO 5 UNITS: 100 INJECTION, SOLUTION INTRAVENOUS; SUBCUTANEOUS at 12:00

## 2020-10-06 RX ADMIN — TRAZODONE HYDROCHLORIDE 100 MG: 50 TABLET ORAL at 21:35

## 2020-10-06 RX ADMIN — Medication 10 ML: at 06:30

## 2020-10-06 RX ADMIN — INSULIN LISPRO 15 UNITS: 100 INJECTION, SOLUTION INTRAVENOUS; SUBCUTANEOUS at 21:35

## 2020-10-06 NOTE — ROUTINE PROCESS
Bedside and Verbal shift change report given to Cassandra Damon (oncoming nurse) by Freda Pina (offgoing nurse). Report included the following information SBAR and MAR.

## 2020-10-06 NOTE — PROGRESS NOTES
Hospitalist Progress Note    Subjective:   Daily Progress Note: 10/6/2020 10:26 AM    Hospital Course: This is a 59-year-old -American female with a history of diabetes, HIV, hypertension, and COPD who presented to the emergency department complaining of decreased taste and oral thrush. She also reports associated generalized weakness, fatigue, cough, vaginal discharge from yeast infection. She reports the symptoms have been present for about a month and have shown no improvement. The patient was tested for Cobin on September 10 that was negative she was taking Diflucan for thrush, but this has not resolved. She admits to noncompliance with her medications. In the ED she was found to have elevated blood sugar levels >900 with hyperosmolarity significant with electrolyte abnormalities. She was given 10 units of IV insulin in the ED and started on sliding scale insulin and Lantus 15 units. Blood sugar levels have shown improvement. Negative for SARS-COV-2. Chest x-ray showed no acute cardiopulmonary infiltrates. Hemoglobin A1c pending. Subjective:    Patient seen and examined at bedside. She continues to complain of     NEG result for SARS-COV-2.      Current Facility-Administered Medications   Medication Dose Route Frequency    montelukast (SINGULAIR) tablet 10 mg  10 mg Oral QHS    triamterene-hydroCHLOROthiazide (DYAZIDE) 37.5-25 mg per capsule 1 Cap  1 Cap Oral DAILY    traZODone (DESYREL) tablet 100 mg  100 mg Oral QHS    sertraline (ZOLOFT) tablet 50 mg  50 mg Oral DAILY    nystatin (MYCOSTATIN) 100,000 unit/mL oral suspension 500,000 Units  500,000 Units Oral QID    ytttghttw-olqvpgfw-bspywrk ala (BIKTARVY) -25 mg tablet 1 Tab  1 Tab Oral DAILY    budesonide-formoteroL (SYMBICORT) 160-4.5 mcg/actuation HFA inhaler 2 Puff  2 Puff Inhalation BID RT    acyclovir (ZOVIRAX) tablet 400 mg  400 mg Oral BID    multivitamin (ONE A DAY) tablet 1 Tab  1 Tab Oral DAILY    sodium chloride (NS) flush 5-40 mL  5-40 mL IntraVENous Q8H    sodium chloride (NS) flush 5-40 mL  5-40 mL IntraVENous PRN    ELECTROLYTE REPLACEMENT PROTOCOL - Potassium  1 Each Other PRN    0.9% sodium chloride infusion  75 mL/hr IntraVENous CONTINUOUS    enoxaparin (LOVENOX) injection 40 mg  40 mg SubCUTAneous Q24H    insulin lispro (HUMALOG) injection   SubCUTAneous Q4H    glucose chewable tablet 16 g  4 Tab Oral PRN    dextrose (D50W) injection syrg 12.5-25 g  25-50 mL IntraVENous PRN    glucagon (GLUCAGEN) injection 1 mg  1 mg IntraMUSCular PRN    insulin glargine (LANTUS) injection 15 Units  0.2 Units/kg SubCUTAneous QHS        Review of Systems  Constitutional: No fevers, No chills, No sweats, No fatigue, No Weakness  Eyes: No redness  Ears, nose, mouth, throat, and face: No nasal congestion, No sore throat, No voice change  Respiratory: No Shortness of Breath, No cough, No wheezing  Cardiovascular: No chest pain, No palpitations, No extremity edema  Gastrointestinal: No nausea, No vomiting, No diarrhea, No abdominal pain  Genitourinary: No frequency, No dysuria, No hematuria  Integument/breast: No skin lesion(s)   Neurological: No Confusion, No headaches, No dizziness      Objective:     Visit Vitals  /71 (BP 1 Location: Left arm, BP Patient Position: At rest)   Pulse 84   Temp 98.3 °F (36.8 °C)   Resp 18   Ht 5' 3\" (1.6 m)   Wt 73 kg (160 lb 15 oz)   SpO2 100%   BMI 28.51 kg/m²      O2 Device: Room air    Temp (24hrs), Av.5 °F (36.9 °C), Min:98.3 °F (36.8 °C), Max:98.7 °F (37.1 °C)      No intake/output data recorded. 10/04 1901 - 10/06 0700  In: -   Out: 450 [Urine:450]    PHYSICAL EXAM:  Constitutional: No acute distress  Skin: Extremities and face reveal no rashes. HEENT: Sclerae anicteric. Extra-occular muscles are intact. No oral ulcers. The neck is supple and no masses. Cardiovascular: Regular rate and rhythm.    Respiratory:  Clear breath sounds bilaterally with no wheezes, rales, or rhonchi. GI: Abdomen nondistended, soft, and nontender. Normal active bowel sounds. Rectal: Deferred   Musculoskeletal: No pitting edema of the lower legs. Able to move all ext  Neurological:  Patient is alert and oriented. Cranial nerves II-XII grossly intact  Psychiatric: Mood appears appropriate       Data Review    Recent Results (from the past 24 hour(s))   GLUCOSE, POC    Collection Time: 10/05/20 10:39 AM   Result Value Ref Range    Glucose (POC) 237 (H) 65 - 100 mg/dL    Performed by Eloisa Rutherford    GLUCOSE, POC    Collection Time: 10/05/20  5:26 PM   Result Value Ref Range    Glucose (POC) 213 (H) 65 - 100 mg/dL    Performed by [facility], POC    Collection Time: 10/05/20  8:44 PM   Result Value Ref Range    Glucose (POC) 257 (H) 65 - 100 mg/dL    Performed by [id number], POC    Collection Time: 10/06/20  1:07 AM   Result Value Ref Range    Glucose (POC) 370 (H) 65 - 100 mg/dL    Performed by Ramu Berger    GLUCOSE, POC    Collection Time: 10/06/20  4:23 AM   Result Value Ref Range    Glucose (POC) 266 (H) 65 - 100 mg/dL    Performed by Ramu Berger    GLUCOSE, POC    Collection Time: 10/06/20  8:26 AM   Result Value Ref Range    Glucose (POC) 173 (H) 65 - 100 mg/dL    Performed by [facility] NICO        XR CHEST PORT   Final Result   Impression:   No acute findings. Active Problems:    Type 2 diabetes mellitus with hyperosmolar nonketotic hyperglycemia ([facility] [address] (10/4/2020)        Assessment/Plan:     Assessment:  1. Hyperosmolar hyperglycemic state, type 2 diabetes mellitus  2. Severe dehydration  3. Hyponatremia  4. HIV  5.  COPD     Plan:   1. Hyperosmolar hyperglycemic state, type 2 diabetes mellitus  Patient states she was taken off three different diabetic medications 1-yr-ago. Suspect  Noncompliance.   HgbA1c pending  Continue on ISS and Lantus 10 units  Will start oral medication at time of discharge  Continue accu-checks  F/u with PCP, Dr. Macy Lin, scheduled in 1 week per patient.      2. Severe dehydration  She received 1 L IVF bolus in ED. Started on continuous IVFs.    3. Hyponatremia, w/ mild hyperkalemia  Resolved with IV rehydration. Will decrease IV fluid rate to 100 mL/hr. Monitor electrolytes.      4. HIV  Resumed on acyclovir and Biktarvy. Follows with Dr. Darline Fountain at [facility].      5. COPD  Compensated. Continue home medications.       DVT Prophylaxis: Lovenox   Code Status: Full        Care Plan discussed with Patient      Total time spent with patient: >35 minutes.

## 2020-10-06 NOTE — PROGRESS NOTES
Problem: Falls - Risk of  Goal: *Absence of Falls  Description: Document Harper Turner Fall Risk and appropriate interventions in the flowsheet. Outcome: Progressing Towards Goal  Note: Fall Risk Interventions:            Medication Interventions: Bed/chair exit alarm         History of Falls Interventions: Bed/chair exit alarm         Problem: Diabetes Self-Management  Goal: *Disease process and treatment process  Description: Define diabetes and identify own type of diabetes; list 3 options for treating diabetes.   Outcome: Progressing Towards Goal

## 2020-10-07 VITALS
RESPIRATION RATE: 18 BRPM | SYSTOLIC BLOOD PRESSURE: 109 MMHG | HEART RATE: 87 BPM | BODY MASS INDEX: 28.52 KG/M2 | DIASTOLIC BLOOD PRESSURE: 71 MMHG | OXYGEN SATURATION: 100 % | TEMPERATURE: 98.5 F | WEIGHT: 160.94 LBS | HEIGHT: 63 IN

## 2020-10-07 LAB
GLUCOSE BLD STRIP.AUTO-MCNC: 119 MG/DL (ref 65–100)
GLUCOSE BLD STRIP.AUTO-MCNC: 206 MG/DL (ref 65–100)
GLUCOSE BLD STRIP.AUTO-MCNC: 299 MG/DL (ref 65–100)
GLUCOSE BLD STRIP.AUTO-MCNC: 338 MG/DL (ref 65–100)
PERFORMED BY, TECHID: ABNORMAL

## 2020-10-07 PROCEDURE — 94640 AIRWAY INHALATION TREATMENT: CPT

## 2020-10-07 PROCEDURE — 74011250636 HC RX REV CODE- 250/636: Performed by: HOSPITALIST

## 2020-10-07 PROCEDURE — 74011250637 HC RX REV CODE- 250/637: Performed by: HOSPITALIST

## 2020-10-07 PROCEDURE — 74011636637 HC RX REV CODE- 636/637: Performed by: FAMILY MEDICINE

## 2020-10-07 PROCEDURE — 74011250636 HC RX REV CODE- 250/636: Performed by: STUDENT IN AN ORGANIZED HEALTH CARE EDUCATION/TRAINING PROGRAM

## 2020-10-07 PROCEDURE — 82962 GLUCOSE BLOOD TEST: CPT

## 2020-10-07 RX ORDER — METFORMIN HYDROCHLORIDE 500 MG/1
500 TABLET ORAL 2 TIMES DAILY WITH MEALS
Qty: 30 TAB | Refills: 1 | Status: SHIPPED | OUTPATIENT
Start: 2020-10-07 | End: 2020-10-07

## 2020-10-07 RX ADMIN — MULTIVITAMIN TABLET 1 TABLET: TABLET at 10:07

## 2020-10-07 RX ADMIN — Medication 10 ML: at 05:50

## 2020-10-07 RX ADMIN — TRIAMTERENE AND HYDROCHLOROTHIAZIDE 1 CAPSULE: 37.5; 25 CAPSULE ORAL at 10:07

## 2020-10-07 RX ADMIN — NYSTATIN 500000 UNITS: 100000 SUSPENSION ORAL at 10:07

## 2020-10-07 RX ADMIN — SODIUM CHLORIDE 75 ML/HR: 9 INJECTION, SOLUTION INTRAVENOUS at 07:39

## 2020-10-07 RX ADMIN — SERTRALINE HYDROCHLORIDE 50 MG: 50 TABLET ORAL at 10:09

## 2020-10-07 RX ADMIN — ACYCLOVIR 400 MG: 800 TABLET ORAL at 10:07

## 2020-10-07 RX ADMIN — BICTEGRAVIR SODIUM, EMTRICITABINE, AND TENOFOVIR ALAFENAMIDE FUMARATE 1 TABLET: 50; 200; 25 TABLET ORAL at 13:41

## 2020-10-07 RX ADMIN — Medication 10 ML: at 13:42

## 2020-10-07 RX ADMIN — NYSTATIN 500000 UNITS: 100000 SUSPENSION ORAL at 13:37

## 2020-10-07 RX ADMIN — ENOXAPARIN SODIUM 40 MG: 100 INJECTION SUBCUTANEOUS at 01:46

## 2020-10-07 RX ADMIN — BUDESONIDE AND FORMOTEROL FUMARATE DIHYDRATE 2 PUFF: 160; 4.5 AEROSOL RESPIRATORY (INHALATION) at 09:10

## 2020-10-07 RX ADMIN — INSULIN LISPRO 12 UNITS: 100 INJECTION, SOLUTION INTRAVENOUS; SUBCUTANEOUS at 12:00

## 2020-10-07 NOTE — ROUTINE PROCESS
Bedside and Verbal shift change report given to Claudia (oncoming nurse) by Silvano Jacobsen (offgoing nurse). Report included the following information SBAR and MAR.

## 2020-10-07 NOTE — PROGRESS NOTES
Imformed Dr. Krunal Jeffries of patient glucose of 456. Orders given to discontinue low dose insulin and order high dose q6h. Administer lantus 20units instead of 15units.

## 2020-10-07 NOTE — DISCHARGE SUMMARY
Hospitalist Discharge Summary     Patient ID:    Spike Mallory  [id number]  35 y.o.  1965    Admit date: 10/4/2020    Discharge date : 10/7/2020    Chronic Diagnoses:    Problem List as of 10/7/2020 Date Reviewed: 9/10/2020          Codes Class Noted - Resolved    Type 2 diabetes mellitus with hyperosmolar nonketotic hyperglycemia ([facility] ICD-10-CM: E11.00  ICD-9-CM: 250.20  10/4/2020 - Present        Diarrhea ICD-10-CM: R19.7  ICD-9-CM: 787.91  9/10/2020 - Present        Thrush ICD-10-CM: B37.0  ICD-9-CM: 112.0  9/10/2020 - Present        Abnormal weight gain ICD-10-CM: R63.5  ICD-9-CM: 783.1  9/9/2020 - Present        Cellulitis and abscess of lower extremity ICD-10-CM: L03.119, L02.419  ICD-9-CM: 682.6  9/9/2020 - Present        Depressive disorder ICD-10-CM: F32.9  ICD-9-CM: 339  9/9/2020 - Present        Dysphagia ICD-10-CM: R13.10  ICD-9-CM: 787.20  9/9/2020 - Present        COPD (chronic obstructive pulmonary disease) ([facility] ICD-10-CM: J44.9  ICD-9-CM: 134  9/9/2020 - Present        Essential hypertension ICD-10-CM: I10  ICD-9-CM: 401.9  9/9/2020 - Present        Dyslipidemia ICD-10-CM: E78.5  ICD-9-CM: 272.4  9/9/2020 - Present        Vitamin D deficiency ICD-10-CM: E55.9  ICD-9-CM: 268.9  9/9/2020 - Present        DM (diabetes mellitus), type 2 ([facility] ICD-10-CM: E11.9  ICD-9-CM: 250.00  9/9/2020 - Present        TIA (transient ischemic attack) ICD-10-CM: G45.9  ICD-9-CM: 435.9  9/9/2020 - Present        Tick bite ICD-10-CM: W57. Jerl Paling  ICD-9-CM: 919.4, E906.4  9/9/2020 - Present        Pain in throat ICD-10-CM: R07.0  ICD-9-CM: 784.1  9/9/2020 - Present        Mononeuritis ICD-10-CM: G58.9  ICD-9-CM: 355.9  9/9/2020 - Present        Insomnia ICD-10-CM: G47.00  ICD-9-CM: 780.52  9/9/2020 - Present        Neck pain ICD-10-CM: M54.2  ICD-9-CM: 723.1  9/9/2020 - Present        Noncompliance with treatment ICD-10-CM: Z91.19  ICD-9-CM: V15.81  9/9/2020 - Present          22    Final Diagnoses: Active Problems:    Type 2 diabetes mellitus with hyperosmolar nonketotic hyperglycemia ([facility] (10/4/2020)        Reason for Hospitalization: Hyperglycemia      Hospital Course: This is a 59-year-old -American female with a history of diabetes, HIV, hypertension, and COPD who presented to the emergency department complaining of decreased taste and oral thrush. The patient was tested for COVID on September 10th that was negative. She was taking Diflucan for thrush without symptoms relief.  She admits to noncompliance with her medications. She was apparently taken off three different diabetic medications 1-year-ago.  In the ED she was found to have elevated blood sugar levels >900 with hyperosmolarity significant with electrolyte abnormalities.  She was given 10 units of IV insulin in the ED and started on sliding scale insulin and Lantus 15 units.  Blood sugar levels have shown improvement, stabilizing to low 100s.  Negative for SARS-COV-2.  Chest x-ray showed no acute cardiopulmonary infiltrates.  Hemoglobin A1c 13.2. She is started on oral Metformin and advised to follow up with PCP, Dr. Dixie Montano, for further changes in diabetic medications. Also advised to follow up with HIV doctor at [facility], Dr. Yann Balderas, for recurrent thrush/yeast infections. Discharge Medications:   Current Discharge Medication List      START taking these medications    Details   metFORMIN (GLUCOPHAGE) 500 mg tablet Take 1 Tab by mouth two (2) times daily (with meals). Qty: 30 Tab, Refills: 1         CONTINUE these medications which have NOT CHANGED    Details   nystatin (MYCOSTATIN) 100,000 unit/mL suspension Take 5 mL by mouth four (4) times daily. swish and spit  Qty: 60 mL, Refills: 1      acyclovir (ZOVIRAX) 400 mg tablet Take 1 Tab by mouth as needed. Biktarvy tab tablet Take 1 Tab by mouth daily.       albuterol (PROVENTIL HFA, VENTOLIN HFA, PROAIR HFA) 90 mcg/actuation inhaler Take 2 Puffs by inhalation as needed. Symbicort 160-4.5 mcg/actuation HFAA Take 2 Puffs by inhalation every twelve (12) hours as needed. montelukast (SINGULAIR) 10 mg tablet Take 1 Tab by mouth daily. Spectravite Adult 50 Plus 0.4-300-250 mg-mcg-mcg tab Take 1 Tab by mouth daily. omeprazole (PRILOSEC) 40 mg capsule Take 1 Cap by mouth daily. sertraline (ZOLOFT) 50 mg tablet Take 1 Tab by mouth daily. traZODone (DESYREL) 100 mg tablet Take 1 Tab by mouth daily. triamterene-hydroCHLOROthiazide (DYAZIDE) 37.5-25 mg per capsule Take 1 Cap by mouth daily. aspirin delayed-release 81 mg tablet Take 1 Tab by mouth daily. zolpidem (AMBIEN) 5 mg tablet Take 1 Tab by mouth nightly as needed. fluticasone propionate (FLONASE) 50 mcg/actuation nasal spray 2 Sprays by Both Nostrils route daily. gabapentin (NEURONTIN) 600 mg tablet Take 1 Tab by mouth three (3) times daily. gentamicin (GARAMYCIN) 0.3 % ophthalmic solution Administer 1 Drop to left eye every four (4) hours. For 5 days . Qty: 1 Bottle, Refills: 0      loperamide (IMODIUM) 2 mg capsule Take 2 capsules initially for a total dose of mgs and then one capsule (2mgs) after each loose stool not to exceed 8 capsules a day  Qty: 40 Cap, Refills: 0         STOP taking these medications       fluconazole (Diflucan) 150 mg tablet Comments:   Reason for Stopping: Follow up Care:    1. Amilcar Browne MD in 1-2 weeks. Follow-up Information     Follow up With Specialties Details Why Contact Info    Amilcar Browne MD Internal Medicine Schedule an appointment as soon as possible for a visit in 1 day blood sugar management [address] [facility],[address], [facility]  [address] [address] Internal Medicine Schedule an appointment as soon as possible for a visit in 1 day HIV management [address] [facility] [address] [phone]              Patient Follow Up Instructions:    Activity: Activity as tolerated  Diet:  Diabetic Diet    Condition at Discharge:  Stable  __________________________________________________________________    Disposition  Home or Self Care  ____________________________________________________________________    Code Status:  Full Code  ___________________________________________________________________    Discharge Exam:  Patient seen and examined by me on discharge day. Pertinent Findings:  Gen:    Not in distress  Chest: Clear lungs  CVS:   Regular rhythm. No edema  Abd:  Soft, not distended, not tender  Neuro:  Alert    CONSULTATIONS: None    Significant Diagnostic Studies:   Recent Results (from the past 24 hour(s))   GLUCOSE, POC    Collection Time: 10/06/20  8:26 AM   Result Value Ref Range    Glucose (POC) 173 (H) 65 - 100 mg/dL    Performed by [address] [place] [address], POC    Collection Time: 10/06/20 11:47 AM   Result Value Ref Range    Glucose (POC) 258 (H) 65 - 100 mg/dL    Performed by [address] [place] [address], POC    Collection Time: 10/06/20  3:58 PM   Result Value Ref Range    Glucose (POC) 277 (H) 65 - 100 mg/dL    Performed by Brian Lara    GLUCOSE, POC    Collection Time: 10/06/20  8:21 PM   Result Value Ref Range    Glucose (POC) 456 (H) 65 - 100 mg/dL    Performed by Brian Lara    GLUCOSE, POC    Collection Time: 10/07/20 12:20 AM   Result Value Ref Range    Glucose (POC) 299 (H) 65 - 100 mg/dL    Performed by [address], POC    Collection Time: 10/07/20  5:46 AM   Result Value Ref Range    Glucose (POC) 119 (H) 65 - 100 mg/dL    Performed by Naun Xie      XR CHEST PORT   Final Result   Impression:   No acute findings.                  Signed:  Blessing Ramírez PA-C  10/7/2020  8:18 AM

## 2020-10-07 NOTE — PROGRESS NOTES
Patient discharged home. Discharge instructions given and explained to pt. Pt verbalized understanding. Peripheral IV discontinued, tip intact, pt tolerated well. Pt off unit via wheelchair. Pt's daughter to transport pt home.

## 2020-10-09 ENCOUNTER — OFFICE VISIT (OUTPATIENT)
Dept: INTERNAL MEDICINE CLINIC | Age: 55
End: 2020-10-09
Payer: MEDICARE

## 2020-10-09 VITALS
DIASTOLIC BLOOD PRESSURE: 80 MMHG | SYSTOLIC BLOOD PRESSURE: 120 MMHG | WEIGHT: 151.2 LBS | HEIGHT: 63 IN | OXYGEN SATURATION: 98 % | TEMPERATURE: 98 F | BODY MASS INDEX: 26.79 KG/M2 | HEART RATE: 72 BPM | RESPIRATION RATE: 18 BRPM

## 2020-10-09 DIAGNOSIS — F51.04 PSYCHOPHYSIOLOGICAL INSOMNIA: ICD-10-CM

## 2020-10-09 DIAGNOSIS — E11.00 TYPE 2 DIABETES MELLITUS WITH HYPEROSMOLAR NONKETOTIC HYPERGLYCEMIA (HCC): Primary | ICD-10-CM

## 2020-10-09 DIAGNOSIS — J44.9 CHRONIC OBSTRUCTIVE PULMONARY DISEASE, UNSPECIFIED COPD TYPE (HCC): ICD-10-CM

## 2020-10-09 DIAGNOSIS — I10 ESSENTIAL HYPERTENSION: ICD-10-CM

## 2020-10-09 DIAGNOSIS — E11.65 UNCONTROLLED TYPE 2 DIABETES MELLITUS WITH HYPERGLYCEMIA (HCC): ICD-10-CM

## 2020-10-09 DIAGNOSIS — J30.9 ALLERGIC RHINITIS, UNSPECIFIED SEASONALITY, UNSPECIFIED TRIGGER: ICD-10-CM

## 2020-10-09 DIAGNOSIS — F32.A DEPRESSIVE DISORDER: ICD-10-CM

## 2020-10-09 DIAGNOSIS — B20 HIV INFECTION, UNSPECIFIED SYMPTOM STATUS (HCC): ICD-10-CM

## 2020-10-09 PROCEDURE — 99214 OFFICE O/P EST MOD 30 MIN: CPT | Performed by: INTERNAL MEDICINE

## 2020-10-09 RX ORDER — GLIPIZIDE 5 MG/1
1 TABLET, FILM COATED, EXTENDED RELEASE ORAL DAILY
COMMUNITY
Start: 2020-09-14 | End: 2020-10-09

## 2020-10-09 RX ORDER — NYSTATIN 100000 [USP'U]/ML
1 SUSPENSION ORAL 4 TIMES DAILY
Qty: 473 ML | Refills: 0 | Status: SHIPPED | OUTPATIENT
Start: 2020-10-09 | End: 2021-06-10

## 2020-10-09 RX ORDER — POTASSIUM CHLORIDE 750 MG/1
1 TABLET, FILM COATED, EXTENDED RELEASE ORAL DAILY
COMMUNITY
End: 2020-10-09

## 2020-10-09 RX ORDER — ZOLPIDEM TARTRATE 5 MG/1
5 TABLET ORAL
Qty: 90 TAB | Refills: 0 | Status: SHIPPED | OUTPATIENT
Start: 2020-10-09 | End: 2021-03-31 | Stop reason: SDUPTHER

## 2020-10-09 RX ORDER — FLUTICASONE PROPIONATE 50 MCG
2 SPRAY, SUSPENSION (ML) NASAL DAILY
Qty: 1 BOTTLE | Refills: 5 | Status: SHIPPED | OUTPATIENT
Start: 2020-10-09 | End: 2021-03-31 | Stop reason: SDUPTHER

## 2020-10-09 RX ORDER — LOSARTAN POTASSIUM AND HYDROCHLOROTHIAZIDE 12.5; 5 MG/1; MG/1
1 TABLET ORAL
Qty: 90 TAB | Refills: 1 | Status: SHIPPED | OUTPATIENT
Start: 2020-10-09 | End: 2021-03-31 | Stop reason: SDUPTHER

## 2020-10-09 RX ORDER — PRAVASTATIN SODIUM 10 MG/1
10 TABLET ORAL DAILY
Qty: 90 TAB | Refills: 1 | Status: SHIPPED | OUTPATIENT
Start: 2020-10-09 | End: 2021-03-31 | Stop reason: SDUPTHER

## 2020-10-09 RX ORDER — PRAVASTATIN SODIUM 10 MG/1
1 TABLET ORAL DAILY
COMMUNITY
End: 2020-10-09 | Stop reason: SDUPTHER

## 2020-10-09 RX ORDER — GLIPIZIDE 5 MG/1
5 TABLET ORAL 2 TIMES DAILY
Qty: 180 TAB | Refills: 0 | Status: SHIPPED | OUTPATIENT
Start: 2020-10-09 | End: 2021-01-11

## 2020-10-09 RX ORDER — ALBUTEROL SULFATE 90 UG/1
2 AEROSOL, METERED RESPIRATORY (INHALATION) AS NEEDED
COMMUNITY
End: 2021-02-05

## 2020-10-09 RX ORDER — METOPROLOL SUCCINATE 25 MG/1
1 TABLET, EXTENDED RELEASE ORAL DAILY
COMMUNITY
Start: 2020-09-03 | End: 2021-01-04

## 2020-10-09 RX ORDER — SPIRONOLACTONE AND HYDROCHLOROTHIAZIDE 25; 25 MG/1; MG/1
1 TABLET ORAL DAILY
COMMUNITY
End: 2020-10-09

## 2020-10-09 RX ORDER — BISMUTH SUBSALICYLATE 262 MG
1 TABLET,CHEWABLE ORAL DAILY
COMMUNITY
End: 2021-03-31 | Stop reason: ALTCHOICE

## 2020-10-09 NOTE — PROGRESS NOTES
Arsalan Hdez is a 54 y.o. female and presents with Hospital Follow Up ([address])    Recently she had visited emergency room and was admitted in hospital for uncontrolled sugar with hyperosmolar nonketotic hyperglycemia with hemoglobin A1c 13.2%. She checked her blood sugar at home fasting blood sugar is 221 according to her it is much better than it used to be 400. She stayed in the hospital from fourth October to seventh October. She has stopped going to her work at [address] since September that she had diarrhea she was taken off from metformin she does not want to go back to  Metformin, she agreed to take Jardiance and glipizide, her blood pressure is controlled, however I changed to losartan hydrochlorothiazide instead of triamterene hydrochlorothiazide she needs refills, she has quit smoking cigarette for last 1-1 and half year ago, she has been  from her boyfriend, about 1 year ago she told me she has insomnia even though she is taking trazodone and sertraline, she wants to go back on zolpidem that she is not getting from pharmacy she has allergic rhinitis and COPD, she needs refills no nausea no vomiting no chest pain or shortness of breath she told me that she cannot go back to work until she feels better and, she is thinking seriously not to go to work because of COVID-19 pandemic and she is immunosuppressed she has HIV taking,Biktarvy    She has no negative thoughts. She has some weight loss. She drinks a lot of water. Review of Systems    Review of Systems   Constitutional: Positive for weight loss. HENT: Negative for congestion. Eyes: Negative for blurred vision. Respiratory: Negative. Cardiovascular: Negative. Gastrointestinal: Negative. Genitourinary: Negative. Musculoskeletal: Negative. Neurological: Negative for dizziness, tingling, tremors and headaches. Endo/Heme/Allergies: Positive for environmental allergies and polydipsia. Psychiatric/Behavioral: Negative for depression, substance abuse and suicidal ideas. The patient has insomnia. The patient is not nervous/anxious.          Past Medical History:   Diagnosis Date    Abnormal weight gain 9/9/2020    Asthma     Cellulitis and abscess of lower extremity 9/9/2020    Contact dermatitis and eczema due to cause     COPD (chronic obstructive pulmonary disease) ([facility] 9/9/2020    Depression     Depressive disorder 9/9/2020    Diabetes ([facility]     DM (diabetes mellitus), type 2 ([facility] 9/9/2020    Dyslipidemia 9/9/2020    Dysphagia 9/9/2020    Essential hypertension 9/9/2020    GERD (gastroesophageal reflux disease)     Headache     HIV (human immunodeficiency virus infection) ([facility]     Hypercholesterolemia     Hypertension     Insomnia 9/9/2020    Mononeuritis 9/9/2020    Neck pain 9/9/2020    Noncompliance with treatment 9/9/2020    Pain in throat 9/9/2020    TIA (transient ischemic attack) 9/9/2020    TIA (transient ischemic attack) 9/9/2020    Tick bite 9/9/2020    Vitamin D deficiency 9/9/2020     Past Surgical History:   Procedure Laterality Date    HX APPENDECTOMY      HX APPENDECTOMY  2012    HX COLONOSCOPY      HX COLONOSCOPY  06/25/2017    HX GI      HX GYN      HX TOTAL LAPAROSCOPIC HYSTERECTOMY  2013     Social History     Socioeconomic History    Marital status: SINGLE     Spouse name: Not on file    Number of children: Not on file    Years of education: Not on file    Highest education level: Not on file   Tobacco Use    Smoking status: Former Smoker    Smokeless tobacco: Never Used    Tobacco comment: QUIT 4 MONTHS AG0   Substance and Sexual Activity    Alcohol use: Not Currently    Drug use: Never     Family History   Problem Relation Age of Onset    Hypertension Mother     Thyroid Disease Mother     Heart Disease Father     Hypertension Father     Diabetes Father     Diabetes Maternal Grandmother      Current Outpatient Medications Medication Sig Dispense Refill    multivitamin (ONE A DAY) tablet Take 1 Tab by mouth daily.  pravastatin (PRAVACHOL) 10 mg tablet Take 1 Tab by mouth daily. Indications: high cholesterol and high triglycerides 90 Tab 1    nystatin (MYCOSTATIN) 100,000 unit/mL suspension Take 5 mL by mouth four (4) times daily for 7 days. swish and spit 473 mL 0    losartan-hydroCHLOROthiazide (HYZAAR) 50-12.5 mg per tablet Take 1 Tab by mouth every morning. Stop triamterene/hctz  Indications: high blood pressure, diabetes with htn 90 Tab 1    glipiZIDE (GLUCOTROL) 5 mg tablet Take 1 Tab by mouth two (2) times a day. Take 30 minutes before breakfast and before dinner. Indications: type 2 diabetes mellitus 180 Tab 0    empagliflozin (Jardiance) 25 mg tablet Take 1 Tab by mouth daily for 30 days. Take  Before breakfast. 30 Tab 3    fluticasone propionate (FLONASE) 50 mcg/actuation nasal spray 2 Sprays by Both Nostrils route daily. Indications: inflammation of the nose due to an allergy 1 Bottle 5    zolpidem (AMBIEN) 5 mg tablet Take 1 Tab by mouth nightly as needed for Sleep. Max Daily Amount: 5 mg. 90 Tab 0    albuterol (PROVENTIL HFA, VENTOLIN HFA, PROAIR HFA) 90 mcg/actuation inhaler Take 2 Puffs by inhalation as needed.  metoprolol succinate (TOPROL-XL) 25 mg XL tablet Take 1 Tab by mouth daily.  acyclovir (ZOVIRAX) 400 mg tablet Take 1 Tab by mouth as needed.  Biktarvy tab tablet Take 1 Tab by mouth daily.  albuterol (PROVENTIL HFA, VENTOLIN HFA, PROAIR HFA) 90 mcg/actuation inhaler Take 2 Puffs by inhalation as needed.  Symbicort 160-4.5 mcg/actuation HFAA Take 2 Puffs by inhalation every twelve (12) hours as needed.  montelukast (SINGULAIR) 10 mg tablet Take 1 Tab by mouth daily.  Spectravite Adult 50 Plus 0.4-300-250 mg-mcg-mcg tab Take 1 Tab by mouth daily.  omeprazole (PRILOSEC) 40 mg capsule Take 1 Cap by mouth daily.       sertraline (ZOLOFT) 50 mg tablet Take 1 Tab by mouth daily.  traZODone (DESYREL) 100 mg tablet Take 1 Tab by mouth daily.  aspirin delayed-release 81 mg tablet Take 1 Tab by mouth daily.  fluticasone propionate (FLONASE) 50 mcg/actuation nasal spray 2 Sprays by Both Nostrils route daily. Allergies   Allergen Reactions    Lisinopril Other (comments)     PT.  States causes swelling lf the throat    Bactrim [Sulfamethoprim] Hives    Elavil Hives    Iron Nausea and Vomiting    Lyrica [Pregabalin] Hives       Objective:  Visit Vitals  /80 (BP 1 Location: Right arm, BP Patient Position: Sitting)   Pulse 72   Temp 98 °F (36.7 °C) (Temporal)   Resp 18   Ht 5' 3\" (1.6 m)   Wt 151 lb 3.2 oz (68.6 kg)   SpO2 98%   BMI 26.78 kg/m²       Physical Exam:   Constitutional: General Appearance: obese pleasant. Level of Distress: NAD. Psychiatric: Mental Status: normal mood and affect Orientation: to time, place, and person. ,normal eye contact. Head: Head: normocephalic and atraumatic. Eyes: Pupils: PERRLA. Sclerae: non-icteric. Neck: Neck: supple, trachea midline, and no masses. Lymph Nodes: no cervical LAD. Thyroid: no enlargement or nodules and non-tender. Lungs: Respiratory effort: no dyspnea. Auscultation: no wheezing, rales/crackles, or rhonchi and breath sounds normal and good air movement. Cardiovascular: Apical Impulse: not displaced. Heart Auscultation: normal S1 and S2; no murmurs, rubs, or gallops; and RRR. Neck vessels: no carotid bruits. Pulses including femoral / pedal: normal throughout. Abdomen: Bowel Sounds: normal. Inspection and Palpation: no tenderness, guarding, or masses and soft and non-distended. Liver: non-tender and no hepatomegaly. Spleen: non-tender and no splenomegaly. Musculoskeletal[de-identified] Extremities: no edema,no varicosities. No Calf tenderness. Neurologic: Gait and Station: normal gait and station. Motor Strength normal right and left. Skin: Inspection and palpation: no rash, lesions, or ulcer. Results for orders placed or performed during the hospital encounter of 10/04/20   CBC WITH AUTOMATED DIFF   Result Value Ref Range    WBC 7.3 3.6 - 11.0 K/uL    RBC 4.53 3.80 - 5.20 M/uL    HGB 14.0 11.5 - 16.0 g/dL    HCT 40.5 35.0 - 47.0 %    MCV 89.4 80.0 - 99.0 FL    MCH 30.9 26.0 - 34.0 PG    MCHC 34.6 30.0 - 36.5 g/dL    RDW 12.8 11.5 - 14.5 %    PLATELET 183 493 - 969 K/uL    MPV 14.0 (H) 8.9 - 12.9 FL    NEUTROPHILS 68 32 - 75 %    LYMPHOCYTES 27 12 - 49 %    MONOCYTES 5 5 - 13 %    EOSINOPHILS 0 0 - 7 %    BASOPHILS 0 0 - 1 %    IMMATURE GRANULOCYTES 0 0.0 - 0.5 %    ABS. NEUTROPHILS 4.9 1.8 - 8.0 K/UL    ABS. LYMPHOCYTES 2.0 0.8 - 3.5 K/UL    ABS. MONOCYTES 0.4 0.0 - 1.0 K/UL    ABS. EOSINOPHILS 0.0 0.0 - 0.4 K/UL    ABS. BASOPHILS 0.0 0.0 - 0.1 K/UL    ABS. IMM.  GRANS. 0.0 0.00 - 0.04 K/UL    DF AUTOMATED     METABOLIC PANEL, BASIC   Result Value Ref Range    Sodium 120 (L) 136 - 145 mmol/L    Potassium 5.4 (H) 3.5 - 5.1 mmol/L    Chloride 82 (L) 97 - 108 mmol/L    CO2 29 21 - 32 mmol/L    Anion gap 9 5 - 15 mmol/L    Glucose 987 (HH) 65 - 100 mg/dL    BUN 20 6 - 20 mg/dL    Creatinine 1.87 (H) 0.55 - 1.02 mg/dL    BUN/Creatinine ratio 11 (L) 12 - 20      GFR est AA 34 (L) >60 ml/min/1.73m2    GFR est non-AA 28 (L) >60 ml/min/1.73m2    Calcium 10.4 (H) 8.5 - 10.1 mg/dL   URINALYSIS W/ REFLEX CULTURE    Specimen: Urine   Result Value Ref Range    Color Yellow/Straw      Appearance Clear Clear      Specific gravity 1.022 1.003 - 1.030      pH (UA) 6.0 5.0 - 8.0      Protein Negative Negative mg/dL    Glucose >300 (A) Negative mg/dL    Ketone Negative Negative mg/dL    Bilirubin Negative Negative      Blood Negative Negative      Urobilinogen 0.1 0.1 - 1.0 EU/dL    Nitrites Negative Negative      Leukocyte Esterase Negative Negative      UA:UC IF INDICATED Culture not indicated by UA result Culture not indicated by UA result      WBC 0-4 0 - 4 /hpf    RBC 0-5 0 - 5 /hpf    Bacteria Negative Negative /hpf    Mucus Trace /lpf   SARS-COV-2   Result Value Ref Range    SARS-CoV-2 Please find results under separate order     CBC WITH AUTOMATED DIFF   Result Value Ref Range    WBC 7.1 3.6 - 11.0 K/uL    RBC 4.21 3.80 - 5.20 M/uL    HGB 12.7 11.5 - 16.0 g/dL    HCT 35.9 35.0 - 47.0 %    MCV 85.3 80.0 - 99.0 FL    MCH 30.2 26.0 - 34.0 PG    MCHC 35.4 30.0 - 36.5 g/dL    RDW 12.6 11.5 - 14.5 %    PLATELET 476 574 - 006 K/uL    MPV 12.8 8.9 - 12.9 FL    NEUTROPHILS 43 32 - 75 %    LYMPHOCYTES 45 12 - 49 %    MONOCYTES 11 5 - 13 %    EOSINOPHILS 1 0 - 7 %    BASOPHILS 0 0 - 1 %    IMMATURE GRANULOCYTES 0 0.0 - 0.5 %    ABS. NEUTROPHILS 3.0 1.8 - 8.0 K/UL    ABS. LYMPHOCYTES 3.2 0.8 - 3.5 K/UL    ABS. MONOCYTES 0.8 0.0 - 1.0 K/UL    ABS. EOSINOPHILS 0.1 0.0 - 0.4 K/UL    ABS. BASOPHILS 0.0 0.0 - 0.1 K/UL    ABS. IMM. GRANS. 0.0 0.00 - 0.04 K/UL    DF AUTOMATED     HEMOGLOBIN A1C WITH EAG   Result Value Ref Range    Hemoglobin A1c 13.2 (H) 4.0 - 5.6 %    Est. average glucose 123 mg/dL   METABOLIC PANEL, COMPREHENSIVE   Result Value Ref Range    Sodium 141 136 - 145 mmol/L    Potassium 3.8 3.5 - 5.1 mmol/L    Chloride 106 97 - 108 mmol/L    CO2 31 21 - 32 mmol/L    Anion gap 4 (L) 5 - 15 mmol/L    Glucose 276 (H) 65 - 100 mg/dL    BUN 13 6 - 20 mg/dL    Creatinine 1.19 (H) 0.55 - 1.02 mg/dL    BUN/Creatinine ratio 11 (L) 12 - 20      GFR est AA 57 (L) >60 ml/min/1.73m2    GFR est non-AA 47 (L) >60 ml/min/1.73m2    Calcium 9.1 8.5 - 10.1 mg/dL    Bilirubin, total 0.7 0.2 - 1.0 mg/dL    AST (SGOT) 21 15 - 37 U/L    ALT (SGPT) 26 12 - 78 U/L    Alk.  phosphatase 95 45 - 117 U/L    Protein, total 7.8 6.4 - 8.2 g/dL    Albumin 3.4 (L) 3.5 - 5.0 g/dL    Globulin 4.4 (H) 2.0 - 4.0 g/dL    A-G Ratio 0.8 (L) 1.1 - 2.2     NOVEL CORONAVIRUS (COVID-19)   Result Value Ref Range    SARS-CoV-2 Not Detected    METABOLIC PANEL, COMPREHENSIVE   Result Value Ref Range    Sodium 139 136 - 145 mmol/L    Potassium 3.4 (L) 3.5 - 5.1 mmol/L Chloride 105 97 - 108 mmol/L    CO2 28 21 - 32 mmol/L    Anion gap 6 5 - 15 mmol/L    Glucose 237 (H) 65 - 100 mg/dL    BUN 8 6 - 20 mg/dL    Creatinine 1.02 0.55 - 1.02 mg/dL    BUN/Creatinine ratio 8 (L) 12 - 20      GFR est AA >60 >60 ml/min/1.73m2    GFR est non-AA 56 (L) >60 ml/min/1.73m2    Calcium 8.6 8.5 - 10.1 mg/dL    Bilirubin, total 0.4 0.2 - 1.0 mg/dL    AST (SGOT) 28 15 - 37 U/L    ALT (SGPT) 32 12 - 78 U/L    Alk. phosphatase 82 45 - 117 U/L    Protein, total 6.8 6.4 - 8.2 g/dL    Albumin 3.0 (L) 3.5 - 5.0 g/dL    Globulin 3.8 2.0 - 4.0 g/dL    A-G Ratio 0.8 (L) 1.1 - 2.2     CBC WITH AUTOMATED DIFF   Result Value Ref Range    WBC 4.3 3.6 - 11.0 K/uL    RBC 3.75 (L) 3.80 - 5.20 M/uL    HGB 11.2 (L) 11.5 - 16.0 g/dL    HCT 32.6 (L) 35.0 - 47.0 %    MCV 86.9 80.0 - 99.0 FL    MCH 29.9 26.0 - 34.0 PG    MCHC 34.4 30.0 - 36.5 g/dL    RDW 12.8 11.5 - 14.5 %    PLATELET 151 603 - 849 K/uL    MPV 12.7 8.9 - 12.9 FL    NEUTROPHILS 42 32 - 75 %    LYMPHOCYTES 49 12 - 49 %    MONOCYTES 7 5 - 13 %    EOSINOPHILS 1 0 - 7 %    BASOPHILS 1 0 - 1 %    IMMATURE GRANULOCYTES 0 0.0 - 0.5 %    ABS. NEUTROPHILS 1.8 1.8 - 8.0 K/UL    ABS. LYMPHOCYTES 2.1 0.8 - 3.5 K/UL    ABS. MONOCYTES 0.3 0.0 - 1.0 K/UL    ABS. EOSINOPHILS 0.0 0.0 - 0.4 K/UL    ABS. BASOPHILS 0.0 0.0 - 0.1 K/UL    ABS. IMM.  GRANS. 0.0 0.00 - 0.04 K/UL    DF AUTOMATED     GLUCOSE, POC   Result Value Ref Range    Glucose (POC) 304 (H) 65 - 100 mg/dL    Performed by ISINTA MARIA LUISA    GLUCOSE, POC   Result Value Ref Range    Glucose (POC) 237 (H) 65 - 100 mg/dL    Performed by Eloisa Rutherford    GLUCOSE, POC   Result Value Ref Range    Glucose (POC) 573 (H) 65 - 100 mg/dL    Performed by ISINTA MARIA LUISA    GLUCOSE, POC   Result Value Ref Range    Glucose (POC) 213 (H) 65 - 100 mg/dL    Performed by Milton Guo    GLUCOSE, POC   Result Value Ref Range    Glucose (POC) 257 (H) 65 - 100 mg/dL    Performed by MICHAEL BARAHONA    GLUCOSE, POC   Result Value Ref Range    Glucose (POC) 370 (H) 65 - 100 mg/dL    Performed by [address] [place] [address], POC   Result Value Ref Range    Glucose (POC) 266 (H) 65 - 100 mg/dL    Performed by [address] [place] [address], POC   Result Value Ref Range    Glucose (POC) 173 (H) 65 - 100 mg/dL    Performed by [facility]    GLUCOSE, POC   Result Value Ref Range    Glucose (POC) 258 (H) 65 - 100 mg/dL    Performed by [facility]    GLUCOSE, POC   Result Value Ref Range    Glucose (POC) 277 (H) 65 - 100 mg/dL    Performed by Arabella Peterson    GLUCOSE, POC   Result Value Ref Range    Glucose (POC) 456 (H) 65 - 100 mg/dL    Performed by Arabella Peterson    GLUCOSE, POC   Result Value Ref Range    Glucose (POC) 299 (H) 65 - 100 mg/dL    Performed by ANGELY KUMAR    GLUCOSE, POC   Result Value Ref Range    Glucose (POC) 119 (H) 65 - 100 mg/dL    Performed by TERELL LAYTON    GLUCOSE, POC   Result Value Ref Range    Glucose (POC) 206 (H) 65 - 100 mg/dL    Performed by Tequila Soares    GLUCOSE, POC   Result Value Ref Range    Glucose (POC) 338 (H) 65 - 100 mg/dL    Performed by Tequila Soares        Assessment/Plan:      ICD-10-CM ICD-9-CM    1. Type 2 diabetes mellitus with hyperosmolar nonketotic hyperglycemia (HCC)  E11.00 250.20    2. Essential hypertension  I10 401.9 CBC WITH AUTOMATED DIFF      METABOLIC PANEL, BASIC   3. Depressive disorder  F32.9 311    4. Psychophysiological insomnia  F51.04 307.42 zolpidem (AMBIEN) 5 mg tablet   5. Allergic rhinitis, unspecified seasonality, unspecified trigger  J30.9 477.9    6. Chronic obstructive pulmonary disease, unspecified COPD type ([facility]  J44.9 496    7. Uncontrolled type 2 diabetes mellitus with hyperglycemia ([facility]  E11.65 250.02      Orders Placed This Encounter    CBC WITH AUTOMATED DIFF    METABOLIC PANEL, BASIC    albuterol (PROVENTIL HFA, VENTOLIN HFA, PROAIR HFA) 90 mcg/actuation inhaler     Sig: Take 2 Puffs by inhalation as needed.     DISCONTD: glipiZIDE SR (GLUCOTROL XL) 5 mg CR tablet     Sig: Take 1 Tab by mouth daily.  metoprolol succinate (TOPROL-XL) 25 mg XL tablet     Sig: Take 1 Tab by mouth daily.  DISCONTD: potassium chloride SR (KLOR-CON 10) 10 mEq tablet     Sig: Take 1 Tab by mouth daily.  DISCONTD: spironolactone-hydrochlorothiazide (ALDACTAZIDE) 25-25 mg per tablet     Sig: Take 1 Tab by mouth daily.  DISCONTD: pravastatin (PRAVACHOL) 10 mg tablet     Sig: Take 1 Tab by mouth daily.  multivitamin (ONE A DAY) tablet     Sig: Take 1 Tab by mouth daily.  pravastatin (PRAVACHOL) 10 mg tablet     Sig: Take 1 Tab by mouth daily. Indications: high cholesterol and high triglycerides     Dispense:  90 Tab     Refill:  1    nystatin (MYCOSTATIN) 100,000 unit/mL suspension     Sig: Take 5 mL by mouth four (4) times daily for 7 days. swish and spit     Dispense:  473 mL     Refill:  0    losartan-hydroCHLOROthiazide (HYZAAR) 50-12.5 mg per tablet     Sig: Take 1 Tab by mouth every morning. Stop triamterene/hctz  Indications: high blood pressure, diabetes with htn     Dispense:  90 Tab     Refill:  1    glipiZIDE (GLUCOTROL) 5 mg tablet     Sig: Take 1 Tab by mouth two (2) times a day. Take 30 minutes before breakfast and before dinner. Indications: type 2 diabetes mellitus     Dispense:  180 Tab     Refill:  0    empagliflozin (Jardiance) 25 mg tablet     Sig: Take 1 Tab by mouth daily for 30 days. Take  Before breakfast.     Dispense:  30 Tab     Refill:  3    fluticasone propionate (FLONASE) 50 mcg/actuation nasal spray     Si Sprays by Both Nostrils route daily. Indications: inflammation of the nose due to an allergy     Dispense:  1 Bottle     Refill:  5    zolpidem (AMBIEN) 5 mg tablet     Sig: Take 1 Tab by mouth nightly as needed for Sleep. Max Daily Amount: 5 mg.      Dispense:  90 Tab     Refill:  0       Recently she was admitted from [address] [place] [address] October due to blood sugar running very high around  987 with dehydration due to hyperosmolar nonketotic hyperglycemia and acute rise in creatinine however on the discharge her creatinine was stabilized, I reviewed admission and discharge summary and I reviewed her labs. Today her fasting blood sugar was 221 at home  which is still high but according to her it used to run around 400. She does not want to take insulin. She does not want to be on metformin. Because in September she had episodes of diarrhea and at that time I had hold her metformin but she thinks that metformin is causing diarrhea she does not want to go back on metformin. Continued on Jardiance 25 mg once a day 30 minutes before eating and glipizide 5 mg twice a day 30 minutes before breakfast and dinner. Refills given. Diabetic diet. She should not eat  start sugar and drink sugar containing beverages and juice and she told me she is not taking. Regular ophthalmologic checkup continue low-dose aspirin started pravastatin. Hypertension controlled but being diabetic I will stop her triamterene hydrochlorothiazide and change to losartan hydrochlorothiazide 50/12.5 mg once a day and continued on, metoprolol ER 25 mg once a day. Refills given. Diet low in sodium. Depression with insomnia she is taking sertraline 50 mg once a day and trazodone 100 mg once a day but still she wants to be on zolpidem that she used to take she has multiple comorbidities including HIV taking Biktarvy, follows ID specialist.  She has no negative thoughts. She told me that once she has been  from her  boyfriend last year she is done and she feels better. Allergic rhinitis with COPD due to history of ex-smoking she has quit smoking cigarette for last 1 year and continued on rescue maintenance inhaler and continue montelukast, continue fluticasone nasal spray and cetirizine. HIV disease according to her she has normal CD4 count and follow  normal viral load awaiting notes from ID specialist and she is taking Biktarvy.   The recommendation given by ID specialist.    Education and counseling given for diabetic diet. Labs ordered. Follow-up in 2 months. Answered all her questions. She told me that she will not go to work until she feels good and she does not want to go for next 3 months because she is scared that due to COVID-19 pandemic and she does not care for her job but she wants to pay attention to her health. She had acute rise in creatinine with creatinine around 1.87 on the day of admission but later on it was normal still I will order CBC BMP to make sure because I restarted losartan also. Answered all her questions. She was given  Diflucan in the hospital she wants nystatin swish and spit on clinical examination I did not see any oral thrush.,    lose weight, increase physical activity, bring BP log to office visit, follow low fat diet, follow low salt diet, routine labs ordered    There are no Patient Instructions on file for this visit. Follow-up and Dispositions    · Return in about 2 months (around 12/9/2020) for htn ,diabetes ,marimar ,multiple ,nonfasting labs now.

## 2020-10-10 LAB
BASOPHILS # BLD AUTO: 0 X10E3/UL (ref 0–0.2)
BASOPHILS NFR BLD AUTO: 1 %
BUN SERPL-MCNC: 19 MG/DL (ref 6–24)
BUN/CREAT SERPL: 15 (ref 9–23)
CALCIUM SERPL-MCNC: 10 MG/DL (ref 8.7–10.2)
CHLORIDE SERPL-SCNC: 99 MMOL/L (ref 96–106)
CO2 SERPL-SCNC: 19 MMOL/L (ref 20–29)
CREAT SERPL-MCNC: 1.26 MG/DL (ref 0.57–1)
EOSINOPHIL # BLD AUTO: 0 X10E3/UL (ref 0–0.4)
EOSINOPHIL NFR BLD AUTO: 1 %
ERYTHROCYTE [DISTWIDTH] IN BLOOD BY AUTOMATED COUNT: 14 % (ref 11.7–15.4)
GLUCOSE SERPL-MCNC: 229 MG/DL (ref 65–99)
HCT VFR BLD AUTO: 40 % (ref 34–46.6)
HGB BLD-MCNC: 12.9 G/DL (ref 11.1–15.9)
IMM GRANULOCYTES # BLD AUTO: 0 X10E3/UL (ref 0–0.1)
IMM GRANULOCYTES NFR BLD AUTO: 0 %
LYMPHOCYTES # BLD AUTO: 2.2 X10E3/UL (ref 0.7–3.1)
LYMPHOCYTES NFR BLD AUTO: 39 %
MCH RBC QN AUTO: 29.5 PG (ref 26.6–33)
MCHC RBC AUTO-ENTMCNC: 32.3 G/DL (ref 31.5–35.7)
MCV RBC AUTO: 92 FL (ref 79–97)
MONOCYTES # BLD AUTO: 0.5 X10E3/UL (ref 0.1–0.9)
MONOCYTES NFR BLD AUTO: 8 %
NEUTROPHILS # BLD AUTO: 2.9 X10E3/UL (ref 1.4–7)
NEUTROPHILS NFR BLD AUTO: 51 %
PLATELET # BLD AUTO: 222 X10E3/UL (ref 150–450)
POTASSIUM SERPL-SCNC: 4.1 MMOL/L (ref 3.5–5.2)
RBC # BLD AUTO: 4.37 X10E6/UL (ref 3.77–5.28)
SODIUM SERPL-SCNC: 141 MMOL/L (ref 134–144)
WBC # BLD AUTO: 5.6 X10E3/UL (ref 3.4–10.8)

## 2020-10-11 NOTE — PROGRESS NOTES
Please call her her hemoglobin is improved however her blood sugar is still 229 it should be between 150-180 she has to be careful for what she eats especially she has to stop starch and sugar she should walk at least minimum 30 minutes around her home or in the home due to COVID-19 pandemic and her kidney function is improved but still she should not take too much ibuprofen or Aleve and she should drink enough water it is 55% but much improved than what it was when she was hospitalized due to dehydration and high sugar.   We will repeat blood sugar in 3 months she should start checking sugar 3 times a day if it remains high then we have to add medicine she told me she cannot take metformin due to diarrhea she is already on glipizide and Jardiance we will see how she, dose in next 3 months if needed I will send her to diabetic specialist.,

## 2020-10-14 NOTE — PROGRESS NOTES
Physician Progress Note      PATIENT:               Cesar Diaz  CSN #:                  [id number]  :                       1965  ADMIT DATE:       10/4/2020 7:28 PM  [address] [place] DATE:        10/7/2020 4:32 PM  RESPONDING  PROVIDER #:        Sandrine CUNNINGHAM PA-C          QUERY TEXT:    Dr. Ramon Cavazos,  Pt admitted with oral thrush. Pt noted to have HIV Disease . If possible, please document in progress notes and discharge summary the relationship, if any, between oral thrush and HIV disease . The medical record reflects the following:  Risk Factors: HIV Disease per H and P  Clinical Indicators: oral thrush, decreased taste    Treatment:  Biktarvy  PO, Nystatin oral suspension        Thank you. Gracia Howell RN CDI, CCS      Ext [phone]  Options provided:  -- Oral thrush due to HIV  -- Oral thrush  unrelated to HIV  -- Other - I will add my own diagnosis  -- Disagree - Not applicable / Not valid  -- Disagree - Clinically unable to determine / Unknown  -- Refer to Clinical Documentation Reviewer    PROVIDER RESPONSE TEXT:    This patient has Oral thrush due to HIV.     Query created by: Jacque Richmond on 10/12/2020 9:42 AM      Electronically signed by:  Vivi Jerez PA-C 10/14/2020 9:37 AM

## 2020-10-16 RX ORDER — MONTELUKAST SODIUM 10 MG/1
TABLET ORAL
Qty: 90 TAB | Refills: 1 | Status: SHIPPED | OUTPATIENT
Start: 2020-10-16 | End: 2021-03-21

## 2020-10-16 RX ORDER — TRIAMTERENE AND HYDROCHLOROTHIAZIDE 37.5; 25 MG/1; MG/1
CAPSULE ORAL
Qty: 90 CAP | Refills: 1 | Status: SHIPPED | OUTPATIENT
Start: 2020-10-16 | End: 2021-03-31 | Stop reason: ALTCHOICE

## 2020-10-24 ENCOUNTER — TELEPHONE (OUTPATIENT)
Dept: INTERNAL MEDICINE CLINIC | Age: 55
End: 2020-10-24

## 2020-10-24 NOTE — TELEPHONE ENCOUNTER
S/W PT. At great length labs results. Read and Reviewed all instructions per Dr. Caitlyn Yanes. Pt agrees that since her BS are still elevated, even after changes and exercise that she does want MARY appt.  Info given Dr. Orlin Verdugo [phone]

## 2020-12-08 VITALS
SYSTOLIC BLOOD PRESSURE: 124 MMHG | BODY MASS INDEX: 30.48 KG/M2 | HEIGHT: 63 IN | DIASTOLIC BLOOD PRESSURE: 94 MMHG | RESPIRATION RATE: 18 BRPM | WEIGHT: 172 LBS | OXYGEN SATURATION: 100 %

## 2020-12-08 PROBLEM — R11.2 NAUSEA AND VOMITING: Status: ACTIVE | Noted: 2020-12-08

## 2020-12-08 PROBLEM — M54.9 CHRONIC BACK PAIN: Status: ACTIVE | Noted: 2020-12-08

## 2020-12-08 PROBLEM — T87.40: Status: ACTIVE | Noted: 2020-12-08

## 2020-12-08 PROBLEM — K64.9 HEMORRHOIDS: Status: ACTIVE | Noted: 2020-12-08

## 2020-12-08 PROBLEM — R51.9 HEADACHE: Status: ACTIVE | Noted: 2020-12-08

## 2020-12-08 PROBLEM — N95.1 MENOPAUSAL FLUSHING: Status: ACTIVE | Noted: 2020-12-08

## 2020-12-08 PROBLEM — Z02.9 ADMINISTRATIVE ENCOUNTER: Status: ACTIVE | Noted: 2020-12-08

## 2020-12-08 PROBLEM — F17.200 TOBACCO DEPENDENCE SYNDROME: Status: ACTIVE | Noted: 2020-12-08

## 2020-12-08 PROBLEM — R13.10 SWALLOWING PAINFUL: Status: ACTIVE | Noted: 2020-12-08

## 2020-12-08 PROBLEM — F17.210 CIGARETTE SMOKER: Status: ACTIVE | Noted: 2020-12-08

## 2020-12-08 PROBLEM — L03.90 CELLULITIS: Status: ACTIVE | Noted: 2020-12-08

## 2020-12-08 PROBLEM — G89.29 CHRONIC BACK PAIN: Status: ACTIVE | Noted: 2020-12-08

## 2020-12-08 PROBLEM — R68.83 CHILL: Status: ACTIVE | Noted: 2020-12-08

## 2020-12-08 PROBLEM — K35.80 ACUTE APPENDICITIS: Status: ACTIVE | Noted: 2020-12-08

## 2020-12-08 PROBLEM — A60.00 RECURRENT GENITAL HERPES SIMPLEX: Status: ACTIVE | Noted: 2020-12-08

## 2020-12-08 PROBLEM — K42.9 UMBILICAL HERNIA: Status: ACTIVE | Noted: 2020-12-08

## 2020-12-08 PROBLEM — R10.9 ABDOMINAL PAIN: Status: ACTIVE | Noted: 2020-12-08

## 2020-12-08 PROBLEM — E28.319 PREMATURE MENOPAUSE: Status: ACTIVE | Noted: 2020-12-08

## 2020-12-08 RX ORDER — TRIAMCINOLONE ACETONIDE 1 MG/G
CREAM TOPICAL 2 TIMES DAILY
COMMUNITY
End: 2021-08-25

## 2020-12-08 RX ORDER — METFORMIN HYDROCHLORIDE 1000 MG/1
1000 TABLET ORAL 2 TIMES DAILY WITH MEALS
COMMUNITY
End: 2021-03-31 | Stop reason: ALTCHOICE

## 2020-12-08 RX ORDER — HYDROCORTISONE 25 MG/G
CREAM TOPICAL 4 TIMES DAILY
COMMUNITY
End: 2021-08-25

## 2020-12-08 RX ORDER — POLYETHYLENE GLYCOL-3350 AND ELECTROLYTES WITH FLAVOR PACK 240; 5.84; 2.98; 6.72; 22.72 G/278.26G; G/278.26G; G/278.26G; G/278.26G; G/278.26G
4 POWDER, FOR SOLUTION ORAL
COMMUNITY
End: 2021-03-31 | Stop reason: ALTCHOICE

## 2020-12-08 RX ORDER — ESTRADIOL 0.5 MG/.5G
GEL TOPICAL
COMMUNITY
End: 2021-03-31 | Stop reason: ALTCHOICE

## 2020-12-08 RX ORDER — ESTRADIOL 0.05 MG/D
1 FILM, EXTENDED RELEASE TRANSDERMAL
COMMUNITY
End: 2021-03-31 | Stop reason: ALTCHOICE

## 2020-12-08 RX ORDER — LABETALOL 200 MG/1
TABLET, FILM COATED ORAL 2 TIMES DAILY
COMMUNITY
End: 2021-03-31 | Stop reason: ALTCHOICE

## 2020-12-08 RX ORDER — ONDANSETRON 4 MG/1
4 TABLET, FILM COATED ORAL
COMMUNITY
End: 2021-03-31 | Stop reason: ALTCHOICE

## 2020-12-08 RX ORDER — CLOTRIMAZOLE 10 MG/1
10 LOZENGE ORAL; TOPICAL
COMMUNITY
End: 2021-03-31 | Stop reason: ALTCHOICE

## 2020-12-08 RX ORDER — AMLODIPINE BESYLATE 10 MG/1
TABLET ORAL DAILY
COMMUNITY
End: 2021-03-31 | Stop reason: ALTCHOICE

## 2020-12-08 RX ORDER — GABAPENTIN 300 MG/1
300 CAPSULE ORAL 3 TIMES DAILY
COMMUNITY
End: 2021-03-31 | Stop reason: ALTCHOICE

## 2020-12-08 RX ORDER — PANTOPRAZOLE SODIUM 40 MG/1
40 TABLET, DELAYED RELEASE ORAL DAILY
COMMUNITY
End: 2021-03-31 | Stop reason: ALTCHOICE

## 2020-12-08 RX ORDER — INSULIN GLARGINE 100 [IU]/ML
INJECTION, SOLUTION SUBCUTANEOUS
COMMUNITY
End: 2021-03-31 | Stop reason: ALTCHOICE

## 2020-12-08 RX ORDER — PREDNISONE 5 MG/1
TABLET ORAL
COMMUNITY
End: 2021-03-31 | Stop reason: ALTCHOICE

## 2020-12-08 RX ORDER — POLYETHYLENE GLYCOL 3350, SODIUM SULFATE ANHYDROUS, SODIUM BICARBONATE, SODIUM CHLORIDE, POTASSIUM CHLORIDE 236; 22.74; 6.74; 5.86; 2.97 G/4L; G/4L; G/4L; G/4L; G/4L
POWDER, FOR SOLUTION ORAL
COMMUNITY
End: 2021-03-31 | Stop reason: ALTCHOICE

## 2020-12-08 RX ORDER — CETIRIZINE HCL 10 MG
TABLET ORAL
COMMUNITY
End: 2021-03-31 | Stop reason: SDUPTHER

## 2020-12-08 RX ORDER — ABACAVIR AND LAMIVUDINE 600; 300 MG/1; MG/1
1 TABLET, FILM COATED ORAL DAILY
COMMUNITY
End: 2021-03-31 | Stop reason: ALTCHOICE

## 2020-12-08 RX ORDER — FENOFIBRATE 160 MG/1
160 TABLET ORAL DAILY
COMMUNITY
End: 2021-03-31 | Stop reason: ALTCHOICE

## 2020-12-08 RX ORDER — LOPERAMIDE HYDROCHLORIDE 2 MG/1
CAPSULE ORAL
COMMUNITY
End: 2021-03-31 | Stop reason: ALTCHOICE

## 2020-12-08 RX ORDER — METRONIDAZOLE 500 MG/1
TABLET ORAL 3 TIMES DAILY
COMMUNITY
End: 2021-03-31 | Stop reason: ALTCHOICE

## 2020-12-08 RX ORDER — LISINOPRIL 20 MG/1
TABLET ORAL DAILY
COMMUNITY
End: 2021-03-31 | Stop reason: ALTCHOICE

## 2020-12-08 RX ORDER — EPINEPHRINE 0.3 MG/.3ML
0.3 INJECTION SUBCUTANEOUS
COMMUNITY
End: 2021-08-25

## 2020-12-08 RX ORDER — PREDNISONE 20 MG/1
TABLET ORAL
COMMUNITY
End: 2021-03-31 | Stop reason: ALTCHOICE

## 2020-12-08 RX ORDER — FAMOTIDINE 40 MG/1
40 TABLET, FILM COATED ORAL DAILY
COMMUNITY
End: 2021-03-31 | Stop reason: ALTCHOICE

## 2020-12-08 RX ORDER — METFORMIN HYDROCHLORIDE 500 MG/1
TABLET ORAL 2 TIMES DAILY WITH MEALS
COMMUNITY
End: 2021-03-31 | Stop reason: ALTCHOICE

## 2020-12-08 RX ORDER — POLYETHYLENE GLYCOL 3350 17 G/17G
17 POWDER, FOR SOLUTION ORAL DAILY
COMMUNITY
End: 2021-03-31 | Stop reason: ALTCHOICE

## 2020-12-08 RX ORDER — BISACODYL 5 MG/1
1 TABLET, COATED ORAL DAILY
COMMUNITY
End: 2021-03-31 | Stop reason: ALTCHOICE

## 2020-12-08 RX ORDER — METOCLOPRAMIDE 10 MG/1
10 TABLET ORAL
COMMUNITY
End: 2021-03-31 | Stop reason: ALTCHOICE

## 2020-12-08 RX ORDER — LOSARTAN POTASSIUM 50 MG/1
TABLET ORAL DAILY
COMMUNITY
End: 2021-03-31 | Stop reason: ALTCHOICE

## 2020-12-08 RX ORDER — NYSTATIN 100000 [USP'U]/ML
SUSPENSION ORAL 4 TIMES DAILY
COMMUNITY
End: 2021-03-31 | Stop reason: ALTCHOICE

## 2020-12-08 RX ORDER — HYDROCODONE BITARTRATE AND ACETAMINOPHEN 5; 325 MG/1; MG/1
TABLET ORAL
COMMUNITY
End: 2021-03-31 | Stop reason: ALTCHOICE

## 2020-12-08 RX ORDER — HYDRALAZINE HYDROCHLORIDE 50 MG/1
25 TABLET, FILM COATED ORAL 3 TIMES DAILY
COMMUNITY
End: 2021-03-31

## 2020-12-08 RX ORDER — POTASSIUM CHLORIDE 750 MG/1
TABLET, FILM COATED, EXTENDED RELEASE ORAL
COMMUNITY
End: 2021-03-31 | Stop reason: ALTCHOICE

## 2020-12-08 RX ORDER — ERGOCALCIFEROL 1.25 MG/1
50000 CAPSULE ORAL
COMMUNITY
End: 2021-03-31 | Stop reason: ALTCHOICE

## 2020-12-08 RX ORDER — GABAPENTIN 600 MG/1
TABLET ORAL 3 TIMES DAILY
COMMUNITY
End: 2021-03-31 | Stop reason: ALTCHOICE

## 2020-12-08 RX ORDER — NIFEDIPINE 60 MG/1
60 TABLET, EXTENDED RELEASE ORAL DAILY
COMMUNITY
End: 2021-08-25

## 2020-12-08 RX ORDER — INSULIN LISPRO 100 [IU]/ML
INJECTION, SOLUTION INTRAVENOUS; SUBCUTANEOUS
COMMUNITY
End: 2021-03-31 | Stop reason: ALTCHOICE

## 2020-12-08 RX ORDER — EMTRICITABINE AND TENOFOVIR ALAFENAMIDE 200; 25 MG/1; MG/1
1 TABLET ORAL DAILY
COMMUNITY
End: 2021-03-31 | Stop reason: ALTCHOICE

## 2020-12-08 RX ORDER — HYDROCORTISONE 25 MG/G
CREAM TOPICAL 2 TIMES DAILY
COMMUNITY
End: 2021-08-25

## 2020-12-08 RX ORDER — METOCLOPRAMIDE 5 MG/1
5 TABLET ORAL
COMMUNITY
End: 2021-03-31 | Stop reason: ALTCHOICE

## 2020-12-08 RX ORDER — PROCHLORPERAZINE MALEATE 5 MG
5 TABLET ORAL
COMMUNITY
End: 2021-03-31 | Stop reason: ALTCHOICE

## 2020-12-08 RX ORDER — GLYBURIDE 2.5 MG/1
TABLET ORAL
COMMUNITY
End: 2021-03-31

## 2020-12-08 RX ORDER — DAPSONE 100 MG/1
100 TABLET ORAL DAILY
COMMUNITY
End: 2021-03-31 | Stop reason: ALTCHOICE

## 2020-12-08 RX ORDER — TRAZODONE HYDROCHLORIDE 50 MG/1
TABLET ORAL
COMMUNITY
End: 2020-12-18

## 2020-12-08 RX ORDER — DOCUSATE SODIUM 100 MG/1
100 CAPSULE, LIQUID FILLED ORAL 2 TIMES DAILY
COMMUNITY
End: 2021-03-31 | Stop reason: ALTCHOICE

## 2020-12-08 RX ORDER — FLUCONAZOLE 200 MG/1
200 TABLET ORAL DAILY
COMMUNITY
End: 2021-03-31 | Stop reason: ALTCHOICE

## 2020-12-08 RX ORDER — NITROGLYCERIN 0.4 MG/1
0.4 TABLET SUBLINGUAL
COMMUNITY
End: 2021-03-31 | Stop reason: ALTCHOICE

## 2020-12-08 RX ORDER — HYDROCHLOROTHIAZIDE 25 MG/1
25 TABLET ORAL DAILY
COMMUNITY
End: 2021-03-31

## 2020-12-08 RX ORDER — FLUCONAZOLE 100 MG/1
200 TABLET ORAL DAILY
COMMUNITY
End: 2021-03-31 | Stop reason: ALTCHOICE

## 2020-12-08 RX ORDER — PREDNISONE 10 MG/1
TABLET ORAL
COMMUNITY
End: 2021-03-31 | Stop reason: ALTCHOICE

## 2020-12-18 RX ORDER — TRAZODONE HYDROCHLORIDE 50 MG/1
TABLET ORAL
Qty: 30 TAB | Refills: 2 | Status: SHIPPED | OUTPATIENT
Start: 2020-12-18 | End: 2021-03-31

## 2021-01-11 RX ORDER — GLIPIZIDE 5 MG/1
TABLET ORAL
Qty: 60 TAB | Refills: 2 | Status: SHIPPED | OUTPATIENT
Start: 2021-01-11 | End: 2021-03-31 | Stop reason: SDUPTHER

## 2021-01-15 ENCOUNTER — OFFICE VISIT (OUTPATIENT)
Dept: PRIMARY CARE CLINIC | Age: 56
End: 2021-01-15
Payer: MEDICARE

## 2021-01-15 VITALS
OXYGEN SATURATION: 96 % | TEMPERATURE: 97.5 F | HEART RATE: 115 BPM | SYSTOLIC BLOOD PRESSURE: 110 MMHG | DIASTOLIC BLOOD PRESSURE: 84 MMHG

## 2021-01-15 DIAGNOSIS — Z20.822 SUSPECTED 2019 NOVEL CORONAVIRUS INFECTION: ICD-10-CM

## 2021-01-15 DIAGNOSIS — Z20.822 EXPOSURE TO 2019 NOVEL CORONAVIRUS: Primary | ICD-10-CM

## 2021-01-15 DIAGNOSIS — R05.9 COUGH: ICD-10-CM

## 2021-01-15 DIAGNOSIS — R68.83 CHILLS (WITHOUT FEVER): ICD-10-CM

## 2021-01-15 PROCEDURE — G8752 SYS BP LESS 140: HCPCS | Performed by: NURSE PRACTITIONER

## 2021-01-15 PROCEDURE — G8427 DOCREV CUR MEDS BY ELIG CLIN: HCPCS | Performed by: NURSE PRACTITIONER

## 2021-01-15 PROCEDURE — 3017F COLORECTAL CA SCREEN DOC REV: CPT | Performed by: NURSE PRACTITIONER

## 2021-01-15 PROCEDURE — G9717 DOC PT DX DEP/BP F/U NT REQ: HCPCS | Performed by: NURSE PRACTITIONER

## 2021-01-15 PROCEDURE — G8754 DIAS BP LESS 90: HCPCS | Performed by: NURSE PRACTITIONER

## 2021-01-15 PROCEDURE — G8419 CALC BMI OUT NRM PARAM NOF/U: HCPCS | Performed by: NURSE PRACTITIONER

## 2021-01-15 PROCEDURE — 99213 OFFICE O/P EST LOW 20 MIN: CPT | Performed by: NURSE PRACTITIONER

## 2021-01-15 NOTE — PROGRESS NOTES
Leonid Tena is a 54 y.o. female who was seen in clinic today (1/15/2021) for an acute visit. Assessment/Plan:            * COVID-19 sample collected and submitted       * Patient given detailed CDC instructions contained within After Visit Summary    Diagnoses and all orders for this visit:    1. Exposure to 2019 novel coronavirus  -     NOVEL CORONAVIRUS (COVID-19)    2. Chills (without fever)    3. Cough    4. Suspected 2019 novel coronavirus infection       known covid exposure. Discussed expected course/resolution/complications of diagnosis in detail with patient. Advised pt on CDC guidance, OTC medications for symptom management, red flags reviewed and should develop to seek emergency medical attn. Reviewed with her that COVID-19 pandemic is an evolving situation with rapidly changing recommendations & guidelines, continue to practice hand hygiene, social distancing, wearing of facial coverings. Regardless of testing results, should still follow CDC guidelines as there is a chance of a false negative, such as a poor sample collection or being too soon to test after exposure. Medical decisions are made based on the the best information available at the time. Recommended she stay tuned for updates published by trusted sources and to advise your PCP of any unexpected changes in clinical condition     Subjective:   Harwood Prader was seen today for Cough, Concern For COVID-19 (Coronavirus), Chills, Generalized Body Aches, Headache, Sore Throat, and Diarrhea  Exposure occurred approx 10 days ago, her daughter tested pos. She denies a recent history/current: loss of smell/taste, cough, fever, wheezing, SOB, and ALCARAZ. Non user of tob. Travel Screening     Question   Response    In the last month, have you been in contact with someone who was confirmed or suspected to have Coronavirus / COVID-19? Yes    Have you had a COVID-19 viral test in the last 14 days?   No    Do you have any of the following new or worsening symptoms? Chills;Cough; Sore throat;Diarrhea    Have you traveled internationally in the last month? No      Travel History   Travel since 12/15/20     No documented travel since 12/15/20          ROS - Pertinent items are noted in HPI    Patient Active Problem List   Diagnosis Code    Abnormal weight gain R63.5    Cellulitis and abscess of lower extremity L03.119, L02.419    Depressive disorder F32.9    Dysphagia R13.10    COPD (chronic obstructive pulmonary disease) ([facility] J44.9    Essential hypertension I10    Dyslipidemia E78.5    Vitamin D deficiency E55.9    DM (diabetes mellitus), type 2 ([facility] E11.9    TIA (transient ischemic attack) G45.9    Tick bite W57. XXXA    Pain in throat R07.0    Mononeuritis G58.9    Insomnia G47.00    Neck pain M54.2    Noncompliance with treatment Z91.19    Diarrhea R19.7    Thrush B37.0    Type 2 diabetes mellitus with hyperosmolar nonketotic hyperglycemia (HCC) E11.00    Uncontrolled type 2 diabetes mellitus with hyperglycemia (HCC) E11.65    Allergic rhinitis, unspecified seasonality, unspecified trigger J30.9    Chronic obstructive pulmonary disease, unspecified COPD type ([facility] J44.9    HIV infection, unspecified symptom status (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  Authorizing Provider   glipiZIDE (GLUCOTROL) 5 mg tablet TAKE 1 TABLET BY MOUTH 30 MINUTES BEFORE BREAKFAST AND BEFORE DINNER 1/11/21   Melissa Barnes MD   metoprolol succinate (TOPROL-XL) 25 mg XL tablet TAKE 1 TABLET BY MOUTH EVERY DAY 1/4/21   Melissa Barnes MD   traZODone (DESYREL) 50 mg tablet TAKE 1 TABLET BY MOUTH EVERY DAY 12/18/20   Melissa Barnes MD   amLODIPine (NORVASC) 10 mg tablet Take  by mouth daily. Provider, Historical   cetirizine (ZYRTEC) 10 mg tablet Take  by mouth. Provider, Historical   clotrimazole (MYCELEX) 10 mg georgina Take 10 mg by mouth five (5) times daily. Provider, Historical   docusate sodium (COLACE) 100 mg capsule Take 100 mg by mouth two (2) times a day. Provider, Historical   EPINEPHrine (EpiPen 2-[place]) 0.3 mg/0.3 mL injection 0.3 mg by IntraMUSCular route once as needed for Allergic Response. Provider, Historical   fenofibrate (LOFIBRA) 160 mg tablet Take 160 mg by mouth daily. Provider, Historical   hydrocortisone (HYTONE) 2.5 % topical cream Apply  to affected area two (2) times a day. use thin layer    Provider, Historical   multivitamins-minerals-lutein (Multivitamin 50 Plus) tab tablet Take 1 Tab by mouth daily. Provider, Historical   nystatin (MYCOSTATIN) 100,000 unit/mL suspension Take  by mouth four (4) times daily. swish and spit    Provider, Historical   hydrocortisone (Proctosol HC) 2.5 % rectal cream Insert  into rectum four (4) times daily. Provider, Historical   triamcinolone acetonide (KENALOG) 0.1 % topical cream Apply  to affected area two (2) times a day. use thin layer    Provider, Historical   emtricitabine-tenofovir alafen (Descovy) tablet Take 1 Tab by mouth daily. Provider, Historical   DICLOFENAC SODIUM PO Take  by mouth. Provider, Historical   dapsone 100 mg tablet Take 100 mg by mouth daily. Provider, Historical   Estradiol (DivigeL) 0.5 mg/0.5 gram (0.1 %) glpk by TransDERmal route. Provider, Historical   famotidine (PEPCID) 40 mg tablet Take 40 mg by mouth daily.     Provider, Historical   estradioL (VIVELLE) 0.05 mg/24 hr 1 Patch by TransDERmal route Every Saturday. Provider, Historical   ergocalciferol (Vitamin D2) 1,250 mcg (50,000 unit) capsule Take 50,000 Units by mouth. Provider, Historical   abacavir-lamiVUDine (Epzicom) 600-300 mg tablet Take 1 Tab by mouth daily. Provider, Historical   fluconazole (DIFLUCAN) 100 mg tablet Take 200 mg by mouth daily. FDA advises cautious prescribing of oral fluconazole in pregnancy. Provider, Historical   fluconazole (DIFLUCAN) 200 mg tablet Take 200 mg by mouth daily. FDA advises cautious prescribing of oral fluconazole in pregnancy. Provider, Historical   gabapentin (NEURONTIN) 300 mg capsule Take 300 mg by mouth three (3) times daily. Provider, Historical   gabapentin (NEURONTIN) 600 mg tablet Take  by mouth three (3) times daily. Provider, Historical   PEG 3350-Electrolytes (Gavilyte-C) 240-22.72-6.72 -5.84 gram solution Take 4 L by mouth now. Provider, Historical   glyBURIDE (DIABETA) 2.5 mg tablet Take  by mouth Daily (before breakfast). Provider, Historical   insulin lispro (HumaLOG U-100 Insulin) 100 unit/mL injection by SubCUTAneous route. Provider, Historical   hydrALAZINE (APRESOLINE) 50 mg tablet Take 25 mg by mouth three (3) times daily. Provider, Historical   hydroCHLOROthiazide (HYDRODIURIL) 25 mg tablet Take 25 mg by mouth daily. Provider, Historical   HYDROcodone-acetaminophen (NORCO) 5-325 mg per tablet Take  by mouth. Provider, Historical   raltegravir (Isentress) 400 mg tablet Take  by mouth two (2) times a day. Provider, Historical   potassium chloride SR (Klor-Con 10) 10 mEq tablet Take  by mouth. Provider, Historical   labetaloL (NORMODYNE) 200 mg tablet Take  by mouth two (2) times a day. Provider, Historical   insulin glargine (Lantus U-100 Insulin) 100 unit/mL injection by SubCUTAneous route nightly. Provider, Historical   lisinopriL (PRINIVIL, ZESTRIL) 20 mg tablet Take  by mouth daily.     Provider, Historical   loperamide (IMODIUM) 2 mg capsule Take  by mouth. Provider, Historical   losartan (COZAAR) 50 mg tablet Take  by mouth daily. Provider, Historical   metFORMIN (GLUCOPHAGE) 1,000 mg tablet Take 1,000 mg by mouth two (2) times daily (with meals). Provider, Historical   metFORMIN (GLUCOPHAGE) 500 mg tablet Take  by mouth two (2) times daily (with meals). Provider, Historical   metoclopramide HCl (REGLAN) 10 mg tablet Take 10 mg by mouth Before breakfast, lunch, dinner and at bedtime. Provider, Historical   metoclopramide HCl (REGLAN) 5 mg tablet Take 5 mg by mouth Before breakfast, lunch, and dinner. Provider, Historical   metroNIDAZOLE (FLAGYL) 500 mg tablet Take  by mouth three (3) times daily. Provider, Historical   NIFEdipine ER (ADALAT CC) 60 mg ER tablet Take 60 mg by mouth daily. Provider, Historical   nitroglycerin (Nitrostat) 0.4 mg SL tablet 0.4 mg by SubLINGual route every five (5) minutes as needed for Chest Pain. Up to 3 doses. Provider, Historical   ondansetron hcl (ZOFRAN) 4 mg tablet Take 4 mg by mouth every eight (8) hours as needed for Nausea or Vomiting. Provider, Historical   pantoprazole (PROTONIX) 40 mg tablet Take 40 mg by mouth daily. Provider, Historical   PEG 3350-Electrolytes (GO-LYTELY) 236-22.74-6.74 -5.86 gram suspension Take  by mouth now. Provider, Historical   polyethylene glycol (MIRALAX) 17 gram packet Take 17 g by mouth daily. Provider, Historical   predniSONE (DELTASONE) 10 mg tablet Take  by mouth daily (with breakfast). Provider, Historical   predniSONE (DELTASONE) 20 mg tablet Take  by mouth daily (with breakfast). Provider, Historical   predniSONE (DELTASONE) 5 mg tablet Take  by mouth. Provider, Historical   prochlorperazine (COMPAZINE) 5 mg tablet Take 5 mg by mouth every six (6) hours as needed for Nausea or Vomiting.     Provider, Historical   triamterene-hydroCHLOROthiazide (DYAZIDE) 37.5-25 mg per capsule TAKE 1 CAPSULE BY MOUTH EVERY DAY IN THE MORNING 10/16/20   Elio Ferreira MD   montelukast (SINGULAIR) 10 mg tablet TAKE 1 TABLET BY MOUTH EVERY DAY 10/16/20   Elio Ferreira MD   albuterol (PROVENTIL HFA, VENTOLIN HFA, PROAIR HFA) 90 mcg/actuation inhaler Take 2 Puffs by inhalation as needed. Provider, Historical   multivitamin (ONE A DAY) tablet Take 1 Tab by mouth daily. Provider, Historical   pravastatin (PRAVACHOL) 10 mg tablet Take 1 Tab by mouth daily. Indications: high cholesterol and high triglycerides 10/9/20   Elio Ferreira MD   losartan-hydroCHLOROthiazide [facility]) 50-12.5 mg per tablet Take 1 Tab by mouth every morning. Stop triamterene/hctz  Indications: high blood pressure, diabetes with htn 10/9/20   Elio Ferreira MD   fluticasone propionate (FLONASE) 50 mcg/actuation nasal spray 2 Sprays by Both Nostrils route daily. Indications: inflammation of the nose due to an allergy 10/9/20   Elio Ferreira MD   zolpidem (AMBIEN) 5 mg tablet Take 1 Tab by mouth nightly as needed for Sleep. Max Daily Amount: 5 mg. 10/9/20   Elio Ferreira MD   acyclovir (ZOVIRAX) 400 mg tablet Take 1 Tab by mouth as needed. 9/3/20   Provider, Historical   Biktarvy tab tablet Take 1 Tab by mouth daily. 9/2/20   Provider, Historical   albuterol (PROVENTIL HFA, VENTOLIN HFA, PROAIR HFA) 90 mcg/actuation inhaler Take 2 Puffs by inhalation as needed. 9/3/20   Provider, Historical   Symbicort 160-4.5 mcg/actuation HFAA Take 2 Puffs by inhalation every twelve (12) hours as needed. 9/3/20   Provider, Historical   Spectravite Adult 50 Plus 0.4-300-250 mg-mcg-mcg tab Take 1 Tab by mouth daily. 9/3/20   Provider, Historical   omeprazole (PRILOSEC) 40 mg capsule Take 1 Cap by mouth daily. 9/3/20   Provider, Historical   sertraline (ZOLOFT) 50 mg tablet Take 1 Tab by mouth daily. 9/3/20   Provider, Historical   traZODone (DESYREL) 100 mg tablet Take 1 Tab by mouth daily.  7/9/20   Provider, Historical   aspirin delayed-release 81 mg tablet Take 1 Tab by mouth daily. Provider, Historical   fluticasone propionate (FLONASE) 50 mcg/actuation nasal spray 2 Sprays by Both Nostrils route daily. Provider, Historical      Allergies   Allergen Reactions    Lisinopril Other (comments)     PT.  States causes swelling lf the throat    Bactrim [Sulfamethoprim] Hives    Elavil Hives    Iron Nausea and Vomiting    Lyrica [Pregabalin] Hives          Objective:   Physical Exam  General:  alert, cooperative, no distress   Ears: Normal external ear canals AU   Sinuses: Normal paranasal sinuses without tenderness   Neck: supple, symmetrical, trachea midline. Heart: normal rate, regular rhythm   Lungs: No dyspneic or audible respiratory distress. Visit Vitals  /84 (BP 1 Location: Right arm, BP Patient Position: Sitting)   Pulse (!) 115   Temp 97.5 °F (36.4 °C) (Oral)   SpO2 96%         Disclaimer:        Medication risks/benefits/costs/interactions/alternatives discussed with patient. She was given an after visit summary which includes diagnoses, current medications, & vitals. She expressed understanding with the diagnosis and plan. Aspects of this note may have been generated using voice recognition software. Despite editing, there may be some syntax errors.        Anne Marie Ledesma NP

## 2021-01-17 LAB — SARS-COV-2, NAA: DETECTED

## 2021-01-19 ENCOUNTER — TELEPHONE (OUTPATIENT)
Dept: PRIMARY CARE CLINIC | Age: 56
End: 2021-01-19

## 2021-02-05 DIAGNOSIS — J44.9 CHRONIC OBSTRUCTIVE PULMONARY DISEASE, UNSPECIFIED (HCC): ICD-10-CM

## 2021-02-05 RX ORDER — ALBUTEROL SULFATE 90 UG/1
AEROSOL, METERED RESPIRATORY (INHALATION)
Qty: 8.5 INHALER | Refills: 0 | Status: SHIPPED | OUTPATIENT
Start: 2021-02-05 | End: 2022-04-21 | Stop reason: SDUPTHER

## 2021-03-17 DIAGNOSIS — F32.9 MAJOR DEPRESSIVE DISORDER, SINGLE EPISODE, UNSPECIFIED: ICD-10-CM

## 2021-03-17 RX ORDER — SERTRALINE HYDROCHLORIDE 50 MG/1
TABLET, FILM COATED ORAL
Qty: 90 TAB | Refills: 1 | OUTPATIENT
Start: 2021-03-17

## 2021-03-21 RX ORDER — MONTELUKAST SODIUM 10 MG/1
TABLET ORAL
Qty: 30 TAB | Refills: 0 | Status: SHIPPED | OUTPATIENT
Start: 2021-03-21 | End: 2021-04-09 | Stop reason: SDUPTHER

## 2021-03-31 ENCOUNTER — OFFICE VISIT (OUTPATIENT)
Dept: INTERNAL MEDICINE CLINIC | Age: 56
End: 2021-03-31
Payer: MEDICARE

## 2021-03-31 VITALS
HEIGHT: 63 IN | TEMPERATURE: 97.8 F | DIASTOLIC BLOOD PRESSURE: 86 MMHG | OXYGEN SATURATION: 93 % | HEART RATE: 84 BPM | SYSTOLIC BLOOD PRESSURE: 130 MMHG | RESPIRATION RATE: 12 BRPM | WEIGHT: 156.8 LBS | BODY MASS INDEX: 27.78 KG/M2

## 2021-03-31 DIAGNOSIS — E55.9 VITAMIN D DEFICIENCY: ICD-10-CM

## 2021-03-31 DIAGNOSIS — E11.00 TYPE 2 DIABETES MELLITUS WITH HYPEROSMOLAR NONKETOTIC HYPERGLYCEMIA (HCC): ICD-10-CM

## 2021-03-31 DIAGNOSIS — J30.9 ALLERGIC RHINITIS, UNSPECIFIED SEASONALITY, UNSPECIFIED TRIGGER: ICD-10-CM

## 2021-03-31 DIAGNOSIS — J44.9 CHRONIC OBSTRUCTIVE PULMONARY DISEASE, UNSPECIFIED COPD TYPE (HCC): ICD-10-CM

## 2021-03-31 DIAGNOSIS — K21.9 GASTROESOPHAGEAL REFLUX DISEASE WITHOUT ESOPHAGITIS: ICD-10-CM

## 2021-03-31 DIAGNOSIS — E11.65 UNCONTROLLED TYPE 2 DIABETES MELLITUS WITH HYPERGLYCEMIA (HCC): ICD-10-CM

## 2021-03-31 DIAGNOSIS — A60.00 RECURRENT GENITAL HERPES SIMPLEX: ICD-10-CM

## 2021-03-31 DIAGNOSIS — F32.A DEPRESSIVE DISORDER: ICD-10-CM

## 2021-03-31 DIAGNOSIS — E78.5 DYSLIPIDEMIA: ICD-10-CM

## 2021-03-31 DIAGNOSIS — B20 HIV INFECTION, UNSPECIFIED SYMPTOM STATUS (HCC): ICD-10-CM

## 2021-03-31 DIAGNOSIS — I10 ESSENTIAL HYPERTENSION: ICD-10-CM

## 2021-03-31 DIAGNOSIS — I10 ESSENTIAL (PRIMARY) HYPERTENSION: ICD-10-CM

## 2021-03-31 DIAGNOSIS — F51.04 PSYCHOPHYSIOLOGICAL INSOMNIA: ICD-10-CM

## 2021-03-31 LAB — GLUCOSE POC: 130 MG/DL

## 2021-03-31 PROCEDURE — 82962 GLUCOSE BLOOD TEST: CPT | Performed by: INTERNAL MEDICINE

## 2021-03-31 PROCEDURE — G8754 DIAS BP LESS 90: HCPCS | Performed by: INTERNAL MEDICINE

## 2021-03-31 PROCEDURE — G8427 DOCREV CUR MEDS BY ELIG CLIN: HCPCS | Performed by: INTERNAL MEDICINE

## 2021-03-31 PROCEDURE — G9717 DOC PT DX DEP/BP F/U NT REQ: HCPCS | Performed by: INTERNAL MEDICINE

## 2021-03-31 PROCEDURE — 3046F HEMOGLOBIN A1C LEVEL >9.0%: CPT | Performed by: INTERNAL MEDICINE

## 2021-03-31 PROCEDURE — G8752 SYS BP LESS 140: HCPCS | Performed by: INTERNAL MEDICINE

## 2021-03-31 PROCEDURE — 3017F COLORECTAL CA SCREEN DOC REV: CPT | Performed by: INTERNAL MEDICINE

## 2021-03-31 PROCEDURE — 99214 OFFICE O/P EST MOD 30 MIN: CPT | Performed by: INTERNAL MEDICINE

## 2021-03-31 PROCEDURE — 2022F DILAT RTA XM EVC RTNOPTHY: CPT | Performed by: INTERNAL MEDICINE

## 2021-03-31 PROCEDURE — G8419 CALC BMI OUT NRM PARAM NOF/U: HCPCS | Performed by: INTERNAL MEDICINE

## 2021-03-31 RX ORDER — METOPROLOL SUCCINATE 25 MG/1
25 TABLET, EXTENDED RELEASE ORAL DAILY
Qty: 30 TAB | Refills: 5 | Status: SHIPPED | OUTPATIENT
Start: 2021-03-31 | End: 2021-09-08

## 2021-03-31 RX ORDER — GLIPIZIDE 5 MG/1
5 TABLET ORAL
Qty: 30 TAB | Refills: 5 | Status: SHIPPED | OUTPATIENT
Start: 2021-03-31 | End: 2021-05-05

## 2021-03-31 RX ORDER — SERTRALINE HYDROCHLORIDE 50 MG/1
50 TABLET, FILM COATED ORAL DAILY
Qty: 30 TAB | Refills: 5 | Status: SHIPPED | OUTPATIENT
Start: 2021-03-31 | End: 2021-04-30 | Stop reason: SDUPTHER

## 2021-03-31 RX ORDER — BUDESONIDE AND FORMOTEROL FUMARATE DIHYDRATE 160; 4.5 UG/1; UG/1
2 AEROSOL RESPIRATORY (INHALATION) 2 TIMES DAILY
Qty: 1 INHALER | Refills: 5 | Status: SHIPPED | OUTPATIENT
Start: 2021-03-31 | End: 2022-07-28 | Stop reason: SDUPTHER

## 2021-03-31 RX ORDER — FLUTICASONE PROPIONATE 50 MCG
2 SPRAY, SUSPENSION (ML) NASAL DAILY
Qty: 1 BOTTLE | Refills: 5 | Status: SHIPPED | OUTPATIENT
Start: 2021-03-31 | End: 2021-10-10

## 2021-03-31 RX ORDER — OMEPRAZOLE 40 MG/1
40 CAPSULE, DELAYED RELEASE ORAL DAILY
Qty: 30 CAP | Refills: 5 | Status: SHIPPED | OUTPATIENT
Start: 2021-03-31 | End: 2021-09-13

## 2021-03-31 RX ORDER — LOSARTAN POTASSIUM AND HYDROCHLOROTHIAZIDE 12.5; 5 MG/1; MG/1
1 TABLET ORAL
Qty: 30 TAB | Refills: 5 | Status: SHIPPED | OUTPATIENT
Start: 2021-03-31 | End: 2021-09-15 | Stop reason: SDUPTHER

## 2021-03-31 RX ORDER — PRAVASTATIN SODIUM 10 MG/1
10 TABLET ORAL DAILY
Qty: 30 TAB | Refills: 5 | Status: SHIPPED | OUTPATIENT
Start: 2021-03-31 | End: 2021-08-25 | Stop reason: SDDI

## 2021-03-31 RX ORDER — CETIRIZINE HCL 10 MG
10 TABLET ORAL DAILY
Qty: 30 TAB | Refills: 5 | Status: SHIPPED | OUTPATIENT
Start: 2021-03-31 | End: 2022-07-28 | Stop reason: ALTCHOICE

## 2021-03-31 RX ORDER — ZOLPIDEM TARTRATE 5 MG/1
5 TABLET ORAL
Qty: 30 TAB | Refills: 2 | Status: SHIPPED | OUTPATIENT
Start: 2021-03-31 | End: 2022-07-28

## 2021-03-31 RX ORDER — BUDESONIDE AND FORMOTEROL FUMARATE DIHYDRATE 160; 4.5 UG/1; UG/1
2 AEROSOL RESPIRATORY (INHALATION) 2 TIMES DAILY
Qty: 1 INHALER | Refills: 5 | Status: SHIPPED | OUTPATIENT
Start: 2021-03-31 | End: 2021-03-31 | Stop reason: SDUPTHER

## 2021-03-31 RX ORDER — BISMUTH SUBSALICYLATE 262 MG
1 TABLET,CHEWABLE ORAL DAILY
Qty: 30 TAB | Refills: 5 | Status: SHIPPED | OUTPATIENT
Start: 2021-03-31 | End: 2021-09-15 | Stop reason: SDUPTHER

## 2021-03-31 NOTE — PROGRESS NOTES
Chief Complaint   Patient presents with    Follow Up Chronic Condition    Hypertension    Diabetes     130 in office    Cholesterol Problem     1. Have you been to the ER, urgent care clinic since your last visit? Hospitalized since your last visit? No    2. Have you seen or consulted any other health care providers outside of the [address] [place] [address] since your last visit? Include any pap smears or colon screening.  No    Visit Vitals  /76 (BP 1 Location: Right upper arm, BP Patient Position: Sitting, BP Cuff Size: Adult)   Pulse 99   Temp 97.8 °F (36.6 °C) (Oral)   Resp 12   Ht 5' 3\" (1.6 m)   Wt 156 lb 12.8 oz (71.1 kg)   SpO2 93%   BMI 27.78 kg/m²

## 2021-03-31 NOTE — PROGRESS NOTES
Mignon Escalona is a 56 y.o. female and presents with Follow Up Chronic Condition, Hypertension, Diabetes (130 in office), and Cholesterol Problem    Ms. Escalona came for follow-up after a long time last time she was seen in October, at that time she stopped Metformin causing stomach upset, and she was started on Jardiance then she never came for follow-up at that time her hemoglobin A1c was very high it went high to 13.2, she did not bring her sugar log, today her fasting blood sugar in the office is 130 , no real recent hemoglobin A1c available fortunately her blood pressure is controlled with current medication she brought all her medicines she needs refills she told me her pharmacy and they will give her refill for fluticasone nasal spray and antihistaminic for allergies she told me she does not have worsening depression since she has been  from  her long relationship with her boyfriend she is feeling, relieved she takes zolpidem only as needed I confirmed with her, she is not taking trazodone, she told me that sometimes she remains, awake and she needs zolpidem she is, having HIV follows infectious disease specialist taking Biktarvy and taking acyclovir, she has started taking Jardiance and taking glipizide 5 mg only once a day.  She agreed to do labs because she is fasting she takes low-dose of statin she needs refills for 30 days because her insurance does not allow her to have 90 days refill.,    Review of Systems    Review of Systems   Constitutional: Negative.    HENT: Negative for sinus pain and sore throat.    Eyes: Negative for blurred vision.   Respiratory: Negative for cough and wheezing.    Cardiovascular: Negative.    Gastrointestinal: Negative.    Genitourinary: Negative.    Musculoskeletal: Negative.    Neurological: Negative for dizziness, tingling and headaches.   Psychiatric/Behavioral: Negative for depression, memory loss and substance abuse. The patient is not nervous/anxious.      Past Medical History:   Diagnosis Date    Abnormal weight gain 2020    Asthma     Cellulitis and abscess of lower extremity 2020    Chronic back pain 2020    Contact dermatitis and eczema due to cause     COPD [facility]chronic obstructive pulmonary disease) ([facility] 2020    Depression     Depressive disorder 2020    Diabetes ([facility]     DM (diabetes mellitus), type 2 ([facility] 2020    Dyslipidemia 2020    Dysphagia 2020    Essential hypertension 2020    GERD (gastroesophageal reflux disease)     Headache     HIV (human immunodeficiency virus infection) ([facility]     Hypercholesterolemia     Hypertension     Insomnia 2020    Mononeuritis 2020    Neck pain 2020    Noncompliance with treatment 2020    Pain in throat 2020    TIA (transient ischemic attack) 2020    TIA (transient ischemic attack) 2020    Tick bite 2020    Vitamin D deficiency 2020     Past Surgical History:   Procedure Laterality Date    HX APPENDECTOMY      HX APPENDECTOMY      HX COLONOSCOPY      HX COLONOSCOPY  2017    HX GI      HX GYN      HX TOTAL LAPAROSCOPIC HYSTERECTOMY       Social History     Socioeconomic History    Marital status: SINGLE     Spouse name: Not on file    Number of children: Not on file    Years of education: Not on file    Highest education level: Not on file   Tobacco Use    Smoking status: Former Smoker     Packs/day: 1.00     Years: 15.00     Pack years: 15.00     Types: Cigarettes     Quit date:      Years since quittin.2    Smokeless tobacco: Never Used    Tobacco comment: QUIT 4 MONTHS AG0   Substance and Sexual Activity    Alcohol use: Not Currently    Drug use: Never     Family History   Problem Relation Age of Onset    Hypertension Mother     Thyroid Disease Mother     Heart Disease Father     Hypertension Father     Diabetes Father     Diabetes Maternal Grandmother      Current Outpatient Medications   Medication Sig Dispense Refill    empagliflozin (Jardiance) 25 mg tablet Take 25 mg by mouth daily.  pravastatin (PRAVACHOL) 10 mg tablet Take 1 Tab by mouth daily. Indications: high cholesterol and high triglycerides 30 Tab 5    omeprazole (PRILOSEC) 40 mg capsule Take 1 Cap by mouth daily. One cap 30 minutes before eating once a food. 30 Cap 5    losartan-hydroCHLOROthiazide (HYZAAR) 50-12.5 mg per tablet Take 1 Tab by mouth every morning. Stop triamterene/hctz  Indications: high blood pressure, diabetes with htn 30 Tab 5    multivitamin (ONE A DAY) tablet Take 1 Tab by mouth daily. 30 Tab 5    glipiZIDE (GLUCOTROL) 5 mg tablet Take 1 Tab by mouth Daily (before breakfast). 30 Tab 5    metoprolol succinate (TOPROL-XL) 25 mg XL tablet Take 1 Tab by mouth daily. 30 Tab 5    sertraline (ZOLOFT) 50 mg tablet Take 1 Tab by mouth daily. 30 Tab 5    cetirizine (ZYRTEC) 10 mg tablet Take 1 Tab by mouth daily. 30 Tab 5    fluticasone propionate (FLONASE) 50 mcg/actuation nasal spray 2 Sprays by Both Nostrils route daily. Indications: inflammation of the nose due to an allergy 1 Bottle 5    empagliflozin (Jardiance) 25 mg tablet Take 1 Tab by mouth daily. Take 30 minutes before breakfast, 30 Tab 2    Symbicort 160-4.5 mcg/actuation HFAA Take 2 Puffs by inhalation two (2) times a day. 1 Inhaler 5    zolpidem (AMBIEN) 5 mg tablet Take 1 Tab by mouth nightly as needed for Sleep. Max Daily Amount: 5 mg. 30 Tab 2    montelukast (SINGULAIR) 10 mg tablet TAKE 1 TABLET BY MOUTH EVERY DAY 30 Tab 0    albuterol (PROVENTIL HFA, VENTOLIN HFA, PROAIR HFA) 90 mcg/actuation inhaler INHALE 2 PUFFS BY MOUTH EVERY 4-6 HOURS AS FOR WHEEZING 8.5 Inhaler 0    EPINEPHrine (EpiPen 2-[place]) 0.3 mg/0.3 mL injection 0.3 mg by IntraMUSCular route once as needed for Allergic Response.  hydrocortisone (HYTONE) 2.5 % topical cream Apply  to affected area two (2) times a day.  use thin layer      hydrocortisone (Proctosol HC) 2.5 % rectal cream Insert  into rectum four (4) times daily.  triamcinolone acetonide (KENALOG) 0.1 % topical cream Apply  to affected area two (2) times a day. use thin layer      acyclovir (ZOVIRAX) 400 mg tablet Take 1 Tab by mouth two (2) times a day.  Biktarvy tab tablet Take 1 Tab by mouth daily. 50mg/200 mg/25 mg per day.  Spectravite Adult 50 Plus 0.4-300-250 mg-mcg-mcg tab Take 1 Tab by mouth daily.  aspirin delayed-release 81 mg tablet Take 1 Tab by mouth daily.  NIFEdipine ER (ADALAT CC) 60 mg ER tablet Take 60 mg by mouth daily. Allergies   Allergen Reactions    Lisinopril Other (comments)     PT.  States causes swelling lf the throat    Bactrim [Sulfamethoprim] Hives    Elavil Hives    Iron Nausea and Vomiting    Lyrica [Pregabalin] Hives       Objective:  Visit Vitals  /86 (BP 1 Location: Right upper arm, BP Patient Position: Sitting, BP Cuff Size: Adult)   Pulse 84   Temp 97.8 °F (36.6 °C) (Oral)   Resp 12   Ht 5' 3\" (1.6 m)   Wt 156 lb 12.8 oz (71.1 kg)   SpO2 93%   BMI 27.78 kg/m²       Physical Exam:   Constitutional: General Appearance: Pleasant. Level of Distress: NAD. Psychiatric: Mental Status: normal mood and affect Orientation: to time, place, and person. ,normal eye contact. Head: Head: normocephalic and atraumatic. Eyes: Pupils: PERRLA. Sclerae: non-icteric. Neck: Neck: supple, trachea midline, and no masses. Lymph Nodes: no cervical LAD. Thyroid: no enlargement or nodules and non-tender. Lungs: Respiratory effort: no dyspnea. Auscultation: no wheezing, rales/crackles, or rhonchi and breath sounds normal and good air movement. Cardiovascular: Apical Impulse: not displaced. Heart Auscultation: normal S1 and S2; no murmurs, rubs, or gallops; and RRR. Neck vessels: no carotid bruits. Pulses including femoral / pedal: normal throughout. Abdomen:  Bowel Sounds: normal. Inspection and Palpation: no tenderness, guarding, or masses and soft and non-distended. Liver: non-tender and no hepatomegaly. Spleen: non-tender and no splenomegaly. Musculoskeletal[de-identified] Extremities: no edema,no varicosities. No Calf tenderness. Neurologic: Gait and Station: normal gait and station. Motor Strength normal right and left. Skin: Inspection and palpation: no rash, lesions, or ulcer. Results for orders placed or performed in visit on 03/31/21   AMB POC GLUCOSE BLOOD, BY GLUCOSE MONITORING DEVICE   Result Value Ref Range    Glucose  MG/DL       Assessment/Plan:      ICD-10-CM ICD-9-CM    1. Essential hypertension  I10 401.9 CBC WITH AUTOMATED DIFF      METABOLIC PANEL, COMPREHENSIVE   2. Uncontrolled type 2 diabetes mellitus with hyperglycemia (HCC)  E11.65 250.02 HEMOGLOBIN A1C WITH EAG      TSH 3RD GENERATION      MICROALBUMIN, UR, RAND W/ MICROALB/CREAT RATIO   3. Depressive disorder  F32.9 311    4. Dyslipidemia  E78.5 272.4 LIPID PANEL   5. Chronic obstructive pulmonary disease, unspecified COPD type ([facility]  J44.9 496    6. HIV infection, unspecified symptom status ([facility]  B20 V08    7. Recurrent genital herpes simplex  A60.00 054.10    8. Allergic rhinitis, unspecified seasonality, unspecified trigger  J30.9 477.9    9. Gastroesophageal reflux disease without esophagitis  K21.9 530.81    10. Essential (primary) hypertension  I10 401.9 metoprolol succinate (TOPROL-XL) 25 mg XL tablet   11. Vitamin D deficiency  E55.9 268.9 VITAMIN D, 25 HYDROXY   12.  Type 2 diabetes mellitus with hyperosmolar nonketotic hyperglycemia (HCC)  E11.00 250.20 AMB POC GLUCOSE BLOOD, BY GLUCOSE MONITORING DEVICE   13. Psychophysiological insomnia  F51.04 307.42 zolpidem (AMBIEN) 5 mg tablet     Orders Placed This Encounter    CBC WITH AUTOMATED DIFF    METABOLIC PANEL, COMPREHENSIVE    HEMOGLOBIN A1C WITH EAG    TSH 3RD GENERATION    MICROALBUMIN, UR, RAND W/ MICROALB/CREAT RATIO    VITAMIN D, 25 HYDROXY    LIPID PANEL    AMB POC GLUCOSE BLOOD, BY GLUCOSE MONITORING DEVICE    empagliflozin (Jardiance) 25 mg tablet     Sig: Take 25 mg by mouth daily.  pravastatin (PRAVACHOL) 10 mg tablet     Sig: Take 1 Tab by mouth daily. Indications: high cholesterol and high triglycerides     Dispense:  30 Tab     Refill:  5    omeprazole (PRILOSEC) 40 mg capsule     Sig: Take 1 Cap by mouth daily. One cap 30 minutes before eating once a food. Dispense:  30 Cap     Refill:  5    losartan-hydroCHLOROthiazide (HYZAAR) 50-12.5 mg per tablet     Sig: Take 1 Tab by mouth every morning. Stop triamterene/hctz  Indications: high blood pressure, diabetes with htn     Dispense:  30 Tab     Refill:  5    multivitamin (ONE A DAY) tablet     Sig: Take 1 Tab by mouth daily. Dispense:  30 Tab     Refill:  5    glipiZIDE (GLUCOTROL) 5 mg tablet     Sig: Take 1 Tab by mouth Daily (before breakfast). Dispense:  30 Tab     Refill:  5    metoprolol succinate (TOPROL-XL) 25 mg XL tablet     Sig: Take 1 Tab by mouth daily. Dispense:  30 Tab     Refill:  5    sertraline (ZOLOFT) 50 mg tablet     Sig: Take 1 Tab by mouth daily. Dispense:  30 Tab     Refill:  5    DISCONTD: Symbicort 160-4.5 mcg/actuation HFAA     Sig: Take 2 Puffs by inhalation two (2) times a day. Dispense:  1 Inhaler     Refill:  5    cetirizine (ZYRTEC) 10 mg tablet     Sig: Take 1 Tab by mouth daily. Dispense:  30 Tab     Refill:  5    fluticasone propionate (FLONASE) 50 mcg/actuation nasal spray     Si Sprays by Both Nostrils route daily. Indications: inflammation of the nose due to an allergy     Dispense:  1 Bottle     Refill:  5    empagliflozin (Jardiance) 25 mg tablet     Sig: Take 1 Tab by mouth daily. Take 30 minutes before breakfast,     Dispense:  30 Tab     Refill:  2    Symbicort 160-4.5 mcg/actuation HFAA     Sig: Take 2 Puffs by inhalation two (2) times a day.      Dispense:  1 Inhaler     Refill:  5    zolpidem (AMBIEN) 5 mg tablet     Sig: Take 1 Tab by mouth nightly as needed for Sleep. Max Daily Amount: 5 mg. Dispense:  30 Tab     Refill:  2       Hypertension controlled medicine reconciliation done, continued on losartan hydrochlorothiazide 50/12.5 mg once a day, metoprolol ER 25 mg once a day. Diet low in sodium. Type 2 diabetes mellitus uncontrolled with last hemoglobin A1c 13.1% in October and she could not tolerate Metformin she had some GI upset and diarrhea. She is taking Jardiance 25 mg/day, and also taking glipizide 5 mg once a day rather than twice a day no blood sugar log available her fasting blood sugar is 130 in the office.,  No  reliable history available for her diet and it seems like she is not checking blood sugar at home. Labs ordered. Continued on low-dose aspirin and pravastatin. Regular ophthalmologic checkup and podiatric checkup. Does not give a good history. She should not have brisk walk aerobic exercise 150 minutes/week. The goal of hemoglobin A1c should be below 7% if it remains very high I will send her to diabetic specialist.    Dyslipidemia also having HIV infection taking pravastatin 10 mg at bedtime. Labs ordered. Allergic rhinitis with history of COPD and she is ex-smoker and she has quit smoking cigarette for almost last 2 years. Continue rescue and maintenance inhaler. Continue fluticasone nasal spray and cetirizine. Refills given. Also takes montelukast.    History of depression with insomnia. Continued on sertraline 50 mg once a day and she takes zolpidem 5 mg at bedtime only as needed. She does not take trazodone. She is cautioned and warned that she should not not make It to have zolpidem every day and which can cause addiction and dependence and sleep hygiene and sleep diary. She follows infectious disease specialist also takes acyclovir. HIV infection continued on Biktarvy,    Follow-up in 2 months and should bring blood sugar log diabetic education given.   Education given for compliance for regular follow-up.    continue present plan, routine labs ordered, call if any problems    There are no Patient Instructions on file for this visit. Follow-up and Dispositions    · Return in about 2 months (around 5/31/2021) for htn ,diabetes ,marimar ,multiple ,fas lab today ,janine for cov vaccine.

## 2021-04-02 LAB
25(OH)D3+25(OH)D2 SERPL-MCNC: 39.6 NG/ML (ref 30–100)
ALBUMIN SERPL-MCNC: 4 G/DL (ref 3.8–4.9)
ALBUMIN/CREAT UR: 35 MG/G CREAT (ref 0–29)
ALBUMIN/GLOB SERPL: 1.1 {RATIO} (ref 1.2–2.2)
ALP SERPL-CCNC: 56 IU/L (ref 39–117)
ALT SERPL-CCNC: 19 IU/L (ref 0–32)
AST SERPL-CCNC: 19 IU/L (ref 0–40)
BASOPHILS # BLD AUTO: 0 X10E3/UL (ref 0–0.2)
BASOPHILS NFR BLD AUTO: 1 %
BILIRUB SERPL-MCNC: 0.5 MG/DL (ref 0–1.2)
BUN SERPL-MCNC: 19 MG/DL (ref 6–24)
BUN/CREAT SERPL: 22 (ref 9–23)
CALCIUM SERPL-MCNC: 9.1 MG/DL (ref 8.7–10.2)
CHLORIDE SERPL-SCNC: 107 MMOL/L (ref 96–106)
CHOLEST SERPL-MCNC: 198 MG/DL (ref 100–199)
CO2 SERPL-SCNC: 24 MMOL/L (ref 20–29)
CREAT SERPL-MCNC: 0.86 MG/DL (ref 0.57–1)
CREAT UR-MCNC: 74.8 MG/DL
EOSINOPHIL # BLD AUTO: 0 X10E3/UL (ref 0–0.4)
EOSINOPHIL NFR BLD AUTO: 1 %
ERYTHROCYTE [DISTWIDTH] IN BLOOD BY AUTOMATED COUNT: 15.4 % (ref 11.7–15.4)
EST. AVERAGE GLUCOSE BLD GHB EST-MCNC: 134 MG/DL
GLOBULIN SER CALC-MCNC: 3.5 G/DL (ref 1.5–4.5)
GLUCOSE SERPL-MCNC: 124 MG/DL (ref 65–99)
HBA1C MFR BLD: 6.3 % (ref 4.8–5.6)
HCT VFR BLD AUTO: 39.6 % (ref 34–46.6)
HDLC SERPL-MCNC: 61 MG/DL
HGB BLD-MCNC: 13.1 G/DL (ref 11.1–15.9)
IMM GRANULOCYTES # BLD AUTO: 0 X10E3/UL (ref 0–0.1)
IMM GRANULOCYTES NFR BLD AUTO: 0 %
LDLC SERPL CALC-MCNC: 112 MG/DL (ref 0–99)
LYMPHOCYTES # BLD AUTO: 1.9 X10E3/UL (ref 0.7–3.1)
LYMPHOCYTES NFR BLD AUTO: 47 %
MCH RBC QN AUTO: 29.5 PG (ref 26.6–33)
MCHC RBC AUTO-ENTMCNC: 33.1 G/DL (ref 31.5–35.7)
MCV RBC AUTO: 89 FL (ref 79–97)
MICROALBUMIN UR-MCNC: 26.5 UG/ML
MONOCYTES # BLD AUTO: 0.4 X10E3/UL (ref 0.1–0.9)
MONOCYTES NFR BLD AUTO: 9 %
NEUTROPHILS # BLD AUTO: 1.7 X10E3/UL (ref 1.4–7)
NEUTROPHILS NFR BLD AUTO: 42 %
PLATELET # BLD AUTO: 238 X10E3/UL (ref 150–450)
POTASSIUM SERPL-SCNC: 4.3 MMOL/L (ref 3.5–5.2)
PROT SERPL-MCNC: 7.5 G/DL (ref 6–8.5)
RBC # BLD AUTO: 4.44 X10E6/UL (ref 3.77–5.28)
SODIUM SERPL-SCNC: 144 MMOL/L (ref 134–144)
TRIGL SERPL-MCNC: 142 MG/DL (ref 0–149)
TSH SERPL DL<=0.005 MIU/L-ACNC: 0.51 UIU/ML (ref 0.45–4.5)
VLDLC SERPL CALC-MCNC: 25 MG/DL (ref 5–40)
WBC # BLD AUTO: 4 X10E3/UL (ref 3.4–10.8)

## 2021-04-02 NOTE — PROGRESS NOTES
Please call her that labs are excellent sugar is well controlled, very happy with sugar means hemoglobin A1c 6.3% with Jardiance and glipizide, potassium is very good 4.3, kidney function improved than before and normal now, urine so some microalbumin ratio up so she needs good control of blood pressure and sugar to prevent microscopic damage to the kidney, vitamin D is normal so continue OTC vitamin D3 1000 unit/day and multivitamin once a day.,  Hemoglobin normal,Cholesterol is normal, bad cholesterol is slightly up 112 but much better,

## 2021-04-09 ENCOUNTER — TELEPHONE (OUTPATIENT)
Dept: INTERNAL MEDICINE CLINIC | Age: 56
End: 2021-04-09

## 2021-04-09 RX ORDER — MONTELUKAST SODIUM 10 MG/1
10 TABLET ORAL DAILY
Qty: 30 TAB | Refills: 5 | Status: SHIPPED | OUTPATIENT
Start: 2021-04-09 | End: 2021-09-08

## 2021-04-09 NOTE — TELEPHONE ENCOUNTER
[facility] faxed request for a 90 day supply of    montelukast (SINGULAIR) 10 mg tablet 30 Tab       Sig: TAKE 1 TABLET BY MOUTH EVERY DAY      LOV 3/31/21  NOV 6/2/21

## 2021-04-23 RX ORDER — TRIAMTERENE AND HYDROCHLOROTHIAZIDE 37.5; 25 MG/1; MG/1
CAPSULE ORAL
Qty: 30 CAP | Refills: 5 | Status: SHIPPED | OUTPATIENT
Start: 2021-04-23 | End: 2021-08-25

## 2021-05-03 RX ORDER — SERTRALINE HYDROCHLORIDE 50 MG/1
50 TABLET, FILM COATED ORAL DAILY
Qty: 90 TAB | Refills: 1 | Status: SHIPPED | OUTPATIENT
Start: 2021-05-03 | End: 2021-11-16

## 2021-05-29 PROBLEM — T87.40: Status: RESOLVED | Noted: 2020-12-08 | Resolved: 2021-05-29

## 2021-06-16 ENCOUNTER — TELEPHONE (OUTPATIENT)
Dept: INTERNAL MEDICINE CLINIC | Age: 56
End: 2021-06-16

## 2021-06-16 RX ORDER — FLUCONAZOLE 150 MG/1
150 TABLET ORAL DAILY
Qty: 1 TABLET | Refills: 0 | Status: SHIPPED | OUTPATIENT
Start: 2021-06-16 | End: 2021-06-17

## 2021-08-03 PROBLEM — J44.9 CHRONIC OBSTRUCTIVE PULMONARY DISEASE, UNSPECIFIED COPD TYPE (HCC): Status: RESOLVED | Noted: 2020-10-09 | Resolved: 2021-08-03

## 2021-08-22 PROBLEM — E28.319 PREMATURE MENOPAUSE: Status: RESOLVED | Noted: 2020-12-08 | Resolved: 2021-08-22

## 2021-08-22 PROBLEM — E11.65 UNCONTROLLED TYPE 2 DIABETES MELLITUS WITH HYPERGLYCEMIA (HCC): Status: RESOLVED | Noted: 2020-10-09 | Resolved: 2021-08-22

## 2021-08-25 ENCOUNTER — OFFICE VISIT (OUTPATIENT)
Dept: INTERNAL MEDICINE CLINIC | Age: 56
End: 2021-08-25
Payer: MEDICARE

## 2021-08-25 VITALS
HEIGHT: 63 IN | RESPIRATION RATE: 12 BRPM | DIASTOLIC BLOOD PRESSURE: 88 MMHG | HEART RATE: 84 BPM | BODY MASS INDEX: 30.12 KG/M2 | OXYGEN SATURATION: 100 % | WEIGHT: 170 LBS | SYSTOLIC BLOOD PRESSURE: 120 MMHG | TEMPERATURE: 98.4 F

## 2021-08-25 DIAGNOSIS — E78.5 DYSLIPIDEMIA: ICD-10-CM

## 2021-08-25 DIAGNOSIS — F32.A DEPRESSIVE DISORDER: ICD-10-CM

## 2021-08-25 DIAGNOSIS — E11.65 TYPE 2 DIABETES MELLITUS WITH HYPERGLYCEMIA, WITHOUT LONG-TERM CURRENT USE OF INSULIN (HCC): ICD-10-CM

## 2021-08-25 DIAGNOSIS — E11.9 TYPE 2 DIABETES MELLITUS WITHOUT COMPLICATION, WITHOUT LONG-TERM CURRENT USE OF INSULIN (HCC): ICD-10-CM

## 2021-08-25 DIAGNOSIS — G47.00 INSOMNIA, UNSPECIFIED TYPE: ICD-10-CM

## 2021-08-25 DIAGNOSIS — E55.9 VITAMIN D DEFICIENCY: ICD-10-CM

## 2021-08-25 DIAGNOSIS — J30.9 ALLERGIC RHINITIS, UNSPECIFIED SEASONALITY, UNSPECIFIED TRIGGER: ICD-10-CM

## 2021-08-25 DIAGNOSIS — J44.9 CHRONIC OBSTRUCTIVE PULMONARY DISEASE, UNSPECIFIED COPD TYPE (HCC): ICD-10-CM

## 2021-08-25 DIAGNOSIS — I10 ESSENTIAL HYPERTENSION: ICD-10-CM

## 2021-08-25 DIAGNOSIS — K21.9 GASTROESOPHAGEAL REFLUX DISEASE WITHOUT ESOPHAGITIS: ICD-10-CM

## 2021-08-25 DIAGNOSIS — B20 HIV INFECTION, UNSPECIFIED SYMPTOM STATUS (HCC): ICD-10-CM

## 2021-08-25 DIAGNOSIS — A60.00 RECURRENT GENITAL HERPES SIMPLEX: ICD-10-CM

## 2021-08-25 DIAGNOSIS — Z87.448 HISTORY OF RENAL INSUFFICIENCY: ICD-10-CM

## 2021-08-25 DIAGNOSIS — Z91.199 NONCOMPLIANCE: ICD-10-CM

## 2021-08-25 LAB — GLUCOSE POC: 446 MG/DL

## 2021-08-25 PROCEDURE — G8754 DIAS BP LESS 90: HCPCS | Performed by: INTERNAL MEDICINE

## 2021-08-25 PROCEDURE — G9717 DOC PT DX DEP/BP F/U NT REQ: HCPCS | Performed by: INTERNAL MEDICINE

## 2021-08-25 PROCEDURE — G8752 SYS BP LESS 140: HCPCS | Performed by: INTERNAL MEDICINE

## 2021-08-25 PROCEDURE — 99214 OFFICE O/P EST MOD 30 MIN: CPT | Performed by: INTERNAL MEDICINE

## 2021-08-25 PROCEDURE — G8417 CALC BMI ABV UP PARAM F/U: HCPCS | Performed by: INTERNAL MEDICINE

## 2021-08-25 PROCEDURE — 82962 GLUCOSE BLOOD TEST: CPT | Performed by: INTERNAL MEDICINE

## 2021-08-25 PROCEDURE — 3017F COLORECTAL CA SCREEN DOC REV: CPT | Performed by: INTERNAL MEDICINE

## 2021-08-25 PROCEDURE — G8427 DOCREV CUR MEDS BY ELIG CLIN: HCPCS | Performed by: INTERNAL MEDICINE

## 2021-08-25 PROCEDURE — 2022F DILAT RTA XM EVC RTNOPTHY: CPT | Performed by: INTERNAL MEDICINE

## 2021-08-25 PROCEDURE — 3044F HG A1C LEVEL LT 7.0%: CPT | Performed by: INTERNAL MEDICINE

## 2021-08-25 RX ORDER — INSULIN GLARGINE 100 [IU]/ML
10 INJECTION, SOLUTION SUBCUTANEOUS
Qty: 300 UNITS | Refills: 2 | Status: SHIPPED | OUTPATIENT
Start: 2021-08-25 | End: 2021-09-02 | Stop reason: SDUPTHER

## 2021-08-25 RX ORDER — INSULIN LISPRO 100 [IU]/ML
4 INJECTION, SOLUTION INTRAVENOUS; SUBCUTANEOUS
Qty: 360 UNITS | Refills: 2 | Status: SHIPPED | OUTPATIENT
Start: 2021-08-25 | End: 2021-10-27 | Stop reason: SDUPTHER

## 2021-08-25 RX ORDER — PEN NEEDLE, DIABETIC 30 GX3/16"
NEEDLE, DISPOSABLE MISCELLANEOUS 4 TIMES DAILY
Qty: 120 PACKAGE | Refills: 3 | Status: SHIPPED | OUTPATIENT
Start: 2021-08-25 | End: 2021-10-27 | Stop reason: SDUPTHER

## 2021-08-25 RX ORDER — TRIAMTERENE AND HYDROCHLOROTHIAZIDE 37.5; 25 MG/1; MG/1
1 CAPSULE ORAL EVERY MORNING
Qty: 30 CAPSULE | Refills: 3 | Status: SHIPPED | OUTPATIENT
Start: 2021-08-25 | End: 2022-01-19

## 2021-08-25 RX ORDER — TRIAMTERENE AND HYDROCHLOROTHIAZIDE 37.5; 25 MG/1; MG/1
CAPSULE ORAL DAILY
COMMUNITY
End: 2021-08-25 | Stop reason: SDUPTHER

## 2021-08-25 NOTE — PROGRESS NOTES
Cara Chiang is a 64 y.o. female and presents with Follow Up Chronic Condition, Diabetes (446 in office ), Hypertension, Skin Problem (Constant itching ), and Migraine    Ms. Radha Robles came for regular follow-up after a long time. She missed her appointment. Today her random blood sugar is 446 in the office. Last time her hemoglobin A1c was 6.3% in April. Her blood pressure fortunately is very well controlled after being on combination of losartan hydrochlorothiazide and metoprolol. She is drinking apple juice, cranberry juice and ginger ale she told me recently she is so busy with her job, doing overtime that she could not come here, she does not check blood sugar at all at home, she was having diabetic supply in the past she used to take insulin, she could not take Metformin causing diarrhea, she is taking Jardiance and, glipizide I started her on Lantus and, Humalog combination again and stopped her glipizide she has HIV, probably in remission but she has not done follow-up with ID specialist and she is getting refill from ID specialist, getting Iker Rausch she has history of insomnia and, situational depression. She told me recently she lost her father about 1 month ago she is to help her mother also, her daughter is my patient. She is otherwise having pleasant personality she takes zolpidem as needed for insomnia and does not take triamterene HCTZ and nifedipine. She agreed to do labs. She does not take any statin. She is very poor historian and not giving good history at all she is very noncompliant,    Review of Systems    Review of Systems   Constitutional: Negative. Weight gain   HENT: Negative for sore throat. Eyes: Negative for blurred vision. Respiratory: Negative for cough and wheezing. Cardiovascular: Negative. Gastrointestinal: Negative. Genitourinary: Negative. Musculoskeletal: Negative. Neurological: Negative for dizziness, tingling, sensory change and headaches. Psychiatric/Behavioral: Negative for depression, substance abuse and suicidal ideas. The patient is not nervous/anxious and does not have insomnia.          Past Medical History:   Diagnosis Date    Abnormal weight gain 9/8/2020    Abnormal weight gain 9/9/2020    Anemia     Arthritis     Asthma     Cellulitis and abscess of lower extremity 9/9/2020    Chronic back pain 12/8/2020    Chronic kidney disease     Chronic obstructive pulmonary disease (HCC)     Chronic pain     Chronic sinusitis 9/8/2020    Contact dermatitis and eczema due to cause     COPD (chronic obstructive pulmonary disease) ([facility] 9/9/2020    Depression     Depressive disorder 9/9/2020    Diabetes ([facility]     DM (diabetes mellitus), type 2 ([facility] 9/9/2020    DM (diabetes mellitus), type 2, uncontrolled ([facility] 9/8/2020    Dyslipidemia 9/9/2020    Dysphagia 9/8/2020    Dysphagia 9/9/2020    Essential hypertension 9/8/2020    Essential hypertension 9/9/2020    GERD (gastroesophageal reflux disease) 9/8/2020    GERD (gastroesophageal reflux disease)     Headache     Hemorrhoids 9/8/2020    HIV (human immunodeficiency virus infection) ([facility]     Hypercholesterolemia     Hypertension     Hypokalemia 9/8/2020    Insomnia 9/8/2020    Insomnia 9/9/2020    Mononeuritis 9/8/2020    Mononeuritis 9/9/2020    Neck pain 9/9/2020    Noncompliance with treatment 9/8/2020    Noncompliance with treatment 9/9/2020    Pain in throat 9/8/2020    Pain in throat 9/9/2020    Pruritus of skin 9/8/2020    TIA (transient ischemic attack) 9/8/2020    TIA (transient ischemic attack) 9/9/2020    TIA (transient ischemic attack) 9/9/2020    Tick bite 9/8/2020    Tick bite 9/9/2020    Tobacco dependence 9/8/2020    Vitamin D deficiency 9/8/2020    Vitamin D deficiency 9/9/2020     Past Surgical History:   Procedure Laterality Date    HX APPENDECTOMY      HX APPENDECTOMY  2012    HX COLONOSCOPY      HX COLONOSCOPY  06/25/2017    HX GI      HX GYN      HX ORTHOPAEDIC      HX TOTAL LAPAROSCOPIC HYSTERECTOMY  2013    VASCULAR SURGERY PROCEDURE UNLIST       Social History     Socioeconomic History    Marital status: SINGLE     Spouse name: Not on file    Number of children: Not on file    Years of education: Not on file    Highest education level: Not on file   Tobacco Use    Smoking status: Former Smoker     Packs/day: 1.00     Years: 15.00     Pack years: 15.00     Types: Cigarettes     Quit date:      Years since quittin.6    Smokeless tobacco: Never Used    Tobacco comment: QUIT 4 MONTHS AG0   Vaping Use    Vaping Use: Never used   Substance and Sexual Activity    Alcohol use: Not Currently    Drug use: Never   Social History Narrative    ** Merged History Encounter **          Social Determinants of Health     Financial Resource Strain:     Difficulty of Paying Living Expenses:    Food Insecurity:     Worried About Running Out of Food in the Last Year:     Ran Out of Food in the Last Year:    Transportation Needs:     Lack of Transportation (Medical):  Lack of Transportation (Non-Medical):    Physical Activity:     Days of Exercise per Week:     Minutes of Exercise per Session:    Stress:     Feeling of Stress :    Social Connections:     Frequency of Communication with Friends and Family:     Frequency of Social Gatherings with Friends and Family:     Attends Hoahaoism Services:     Active Member of Clubs or Organizations:     Attends Club or Organization Meetings:     Marital Status:      Family History   Problem Relation Age of Onset    Hypertension Mother     Thyroid Disease Mother     Heart Disease Father     Hypertension Father     Diabetes Father     Diabetes Maternal Grandmother      Current Outpatient Medications   Medication Sig Dispense Refill    insulin glargine (LANTUS,BASAGLAR) 100 unit/mL (3 mL) inpn 10 Units by SubCUTAneous route nightly. Start 10 units at bedtime.  300 Units 2    insulin lispro (HUMALOG) 100 unit/mL kwikpen 4 Units by SubCUTAneous route three (3) times daily (with meals). 360 Units 2    Insulin Needles, Disposable, 31 gauge x 5/16\" ndle by SubCUTAneous route four (4) times daily. To take lantus at bedtime and humalog three times a day 120 Package 3    triamterene-hydroCHLOROthiazide (DYAZIDE) 37.5-25 mg per capsule Take 1 Capsule by mouth Every morning. Please call patient to stop losartan HCTZ and start triamterene HCTZ because she has history of throat swelling with lisinopril 30 Capsule 3    Jardiance 25 mg tablet TAKE 1 TABLET BY MOUTH EVERY DAY 30 MINUTES BEFORE BREAKFAST 90 Tablet 0    Spectravite Adult 50 Plus 0.4-300-250 mg-mcg-mcg tab TAKE 1 TABLET BY MOUTH EVERY DAY 90 Tablet 1    sertraline (ZOLOFT) 50 mg tablet Take 1 Tab by mouth daily. 90 Tab 1    montelukast (SINGULAIR) 10 mg tablet Take 1 Tab by mouth daily. 30 Tab 5    omeprazole (PRILOSEC) 40 mg capsule Take 1 Cap by mouth daily. One cap 30 minutes before eating once a food. 30 Cap 5    losartan-hydroCHLOROthiazide (HYZAAR) 50-12.5 mg per tablet Take 1 Tab by mouth every morning. Stop triamterene/hctz  Indications: high blood pressure, diabetes with htn 30 Tab 5    metoprolol succinate (TOPROL-XL) 25 mg XL tablet Take 1 Tab by mouth daily. 30 Tab 5    cetirizine (ZYRTEC) 10 mg tablet Take 1 Tab by mouth daily. 30 Tab 5    fluticasone propionate (FLONASE) 50 mcg/actuation nasal spray 2 Sprays by Both Nostrils route daily. Indications: inflammation of the nose due to an allergy 1 Bottle 5    Symbicort 160-4.5 mcg/actuation HFAA Take 2 Puffs by inhalation two (2) times a day. 1 Inhaler 5    zolpidem (AMBIEN) 5 mg tablet Take 1 Tab by mouth nightly as needed for Sleep.  Max Daily Amount: 5 mg. 30 Tab 2    albuterol (PROVENTIL HFA, VENTOLIN HFA, PROAIR HFA) 90 mcg/actuation inhaler INHALE 2 PUFFS BY MOUTH EVERY 4-6 HOURS AS FOR WHEEZING 8.5 Inhaler 0    acyclovir (ZOVIRAX) 400 mg tablet Take 1 Tab by mouth two (2) times a day.  Biktarvy tab tablet Take 1 Tab by mouth daily. 50mg/200 mg/25 mg per day.  aspirin delayed-release 81 mg tablet Take 1 Tab by mouth daily.  multivitamin (ONE A DAY) tablet Take 1 Tab by mouth daily. (Patient not taking: Reported on 8/25/2021) 30 Tab 5     Allergies   Allergen Reactions    Lisinopril Swelling     SWELLING OF THE THROAT    Lisinopril Other (comments)     PT.  States causes swelling lf the throat    Bactrim [Sulfamethoprim] Hives    Elavil Hives    Iron Nausea and Vomiting    Lyrica [Pregabalin] Hives       Objective:  Visit Vitals  /88 (BP 1 Location: Right upper arm, BP Patient Position: Sitting, BP Cuff Size: Adult)   Pulse 84   Temp 98.4 °F (36.9 °C) (Oral)   Resp 12   Ht 5' 3\" (1.6 m)   Wt 170 lb (77.1 kg)   SpO2 100%   BMI 30.11 kg/m²       Physical Exam:   Constitutional: General Appearance: Pleasant. Level of Distress: NAD. Psychiatric: Mental Status: normal mood and affect Orientation: to time, place, and person. ,normal eye contact. Head: Head: normocephalic and atraumatic. Eyes: Pupils: PERRLA. Sclerae: non-icteric. Neck: Neck: supple, trachea midline, and no masses. Lymph Nodes: no cervical LAD. Thyroid: no enlargement or nodules and non-tender. Lungs: Respiratory effort: no dyspnea. Auscultation: no wheezing, rales/crackles, or rhonchi and breath sounds normal and good air movement. Cardiovascular: Apical Impulse: not displaced. Heart Auscultation: normal S1 and S2; no murmurs, rubs, or gallops; and RRR. Neck vessels: no carotid bruits. Pulses including femoral / pedal: normal throughout. Abdomen: Bowel Sounds: normal. Inspection and Palpation: no tenderness, guarding, or masses and soft and non-distended. Liver: non-tender and no hepatomegaly. Spleen: non-tender and no splenomegaly. Musculoskeletal[de-identified] Extremities: no edema,no varicosities. No Calf tenderness. Neurologic: Gait and Station: normal gait and station. Motor Strength normal right and left. Skin: Inspection and palpation: no rash, lesions, or ulcer. Results for orders placed or performed in visit on 08/25/21   AMB POC GLUCOSE BLOOD, BY GLUCOSE MONITORING DEVICE   Result Value Ref Range    Glucose  MG/DL       Assessment/Plan:      ICD-10-CM ICD-9-CM    1. Essential hypertension  I10 401.9 CBC WITH AUTOMATED DIFF      METABOLIC PANEL, COMPREHENSIVE      URINALYSIS W/ RFLX MICROSCOPIC   2. Type 2 diabetes mellitus with hyperglycemia, without long-term current use of insulin ([facility])  E11.65 250.00 REFERRAL TO ENDOCRINOLOGY     790.29 HEMOGLOBIN A1C WITH EAG      URINALYSIS W/ RFLX MICROSCOPIC      TSH 3RD GENERATION      MICROALBUMIN, UR, RAND W/ MICROALB/CREAT RATIO   3. Depressive disorder  F32.9 311 TSH 3RD GENERATION   4. HIV infection, unspecified symptom status ([facility]  B20 V08    5. Insomnia, unspecified type  G47.00 780.52    6. Noncompliance  Z91.19 V15.81    7. Dyslipidemia  E78.5 272.4 LIPID PANEL   8. Gastroesophageal reflux disease without esophagitis  K21.9 530.81    9. Recurrent genital herpes simplex  A60.00 054.10    10. Allergic rhinitis, unspecified seasonality, unspecified trigger  J30.9 477.9    11. Chronic obstructive pulmonary disease, unspecified COPD type ([facility]  J44.9 496    12. History of renal insufficiency  Z87.448 V13.09    13. Type 2 diabetes mellitus without complication, without long-term current use of insulin ([facility])  E11.9 250.00 AMB POC GLUCOSE BLOOD, BY GLUCOSE MONITORING DEVICE   14.  Vitamin D deficiency  E55.9 268.9 VITAMIN D, 25 HYDROXY     Orders Placed This Encounter    CBC WITH AUTOMATED DIFF    METABOLIC PANEL, COMPREHENSIVE    LIPID PANEL    HEMOGLOBIN A1C WITH EAG    URINALYSIS W/ RFLX MICROSCOPIC    TSH 3RD GENERATION    MICROALBUMIN, UR, RAND W/ MICROALB/CREAT RATIO    VITAMIN D, 25 HYDROXY    [facility] [address] [facility]     Referral Priority:   Routine     Referral Type:   Consultation     Referral Reason: Specialty Services Required     Referred to Provider:   Terrance Jones MD     Number of Visits Requested:   1    AMB POC GLUCOSE BLOOD, BY GLUCOSE MONITORING DEVICE    insulin glargine (LANTUS,BASAGLAR) 100 unit/mL (3 mL) inpn     Sig: 10 Units by SubCUTAneous route nightly. Start 10 units at bedtime. Dispense:  300 Units     Refill:  2    insulin lispro (HUMALOG) 100 unit/mL kwikpen     Si Units by SubCUTAneous route three (3) times daily (with meals). Dispense:  360 Units     Refill:  2    Insulin Needles, Disposable, 31 gauge x 5/16\" ndle     Sig: by SubCUTAneous route four (4) times daily. To take lantus at bedtime and humalog three times a day     Dispense:  120 Package     Refill:  3    DISCONTD: triamterene-hydroCHLOROthiazide (DYAZIDE) 37.5-25 mg per capsule     Sig: Take  by mouth daily.  triamterene-hydroCHLOROthiazide (DYAZIDE) 37.5-25 mg per capsule     Sig: Take 1 Capsule by mouth Every morning. Please call patient to stop losartan HCTZ and start triamterene HCTZ because she has history of throat swelling with lisinopril     Dispense:  30 Capsule     Refill:  3     Please call patient, she should stop losartan HCTZ because she has history of throat swelling with lisinopril       Hypertension almost controlled diastolic slightly upper side I will not make changes in the medicine continued on metoprolol ER 25 mg once a day and losartan hydrochlorothiazide, she had allergic reaction to hold lisinopril she cannot take losartan I explained her, left a message, at 1 time she was taking triamterene HCTZ  37.5/25 mg once a day and nifedipine 60 mg/day. I will start back. I reviewed the ER notes that she had told that she had swelling of throat due to lisinopril so it is not safe for her to have losartan and in that case I will start her back on triamterene HCTZ 37.5/25 mg/day. I called and left a message on her cell phone no answer available.   I left a message 3 times that she should not take losartan HCTZ she should go back on triamterene HCTZ because losartan can cause angioedema. Type 2 diabetes mellitus with hyperglycemia random blood sugar is 446 in the office she has started drinking apple juice cranberry juice and ginger ale which is regular explained that she cannot take sugar-containing juice. She is taking Jardiance 25 mg/day and glipizide 5 mg twice a day before breakfast.  I stopped her glipizide started on Lantus 10 units at bedtime and Humalog 4 units with each meal she takes meal 3 times a day subcutaneously insulin needles and insulin pen sent to the pharmacy. Referred her to endocrinologist.  Lay Tinoco ordered including hemoglobin A1c. She should see diabetic eye specialist.  She should take aspirin 81 mg/day. She has not been taking pravastatin that was prescribed she told me she never got it from pharmacy. She is noncompliant for follow-up. Not a good history available. Diabetic diet less than 1800 ADA diet. She is very noncompliant not providing good history at all,    Hypercholesteremia/dyslipidemia labs ordered I will start back on statin low-dose. HIV follows ID specialist at [facility] she has not seen for last 6 months strongly recommended to see them she gets refill, from them for Biktarvy tablet 1 pill daily. Regular monitoring of CD4 count that she should do with ID specialist.    Insomnia with depression taking sertraline and taking zolpidem 5 mg at bedtime as needed. No negative thoughts. History of COPD ex-smoker taking rescue and maintenance inhaler. Allergic rhinitis taking montelukast and fluticasone nasal spray.     Strongly recommended to give alternative phone numbers that is working so that we can reach her in the past also several times we try to call and we could not reach her, and today also she is not picking up the phone number and also other phone number also is not working, could not leave the voice message on on the number I left a voice message on her phone number. Follow-up in 3 weeks with blood sugar log she should start checking blood sugar 3 times a day before each meal and at bedtime. She told me she cannot check and she has not checked blood sugar for last several months. Labs ordered. continue present plan, routine labs ordered, call if any problems    There are no Patient Instructions on file for this visit. Follow-up and Dispositions    · Return in about 3 weeks (around 9/15/2021) for follow up for chronic condition.

## 2021-08-25 NOTE — PROGRESS NOTES
Chief Complaint   Patient presents with    Follow Up Chronic Condition    Diabetes     446 in office     Hypertension    Skin Problem     Constant itching     Migraine     1. Have you been to the ER, urgent care clinic since your last visit? Hospitalized since your last visit? No    2. Have you seen or consulted any other health care providers outside of the [address] [place] [address] since your last visit? Include any pap smears or colon screening.  No     Visit Vitals  /87 (BP 1 Location: Left upper arm, BP Patient Position: Sitting, BP Cuff Size: Adult)   Pulse 99   Temp 98.4 °F (36.9 °C) (Oral)   Resp 12   Ht 5' 3\" (1.6 m)   Wt 170 lb (77.1 kg)   SpO2 100%   BMI 30.11 kg/m²

## 2021-08-26 LAB
25(OH)D3+25(OH)D2 SERPL-MCNC: 28.8 NG/ML (ref 30–100)
ALBUMIN SERPL-MCNC: 4.5 G/DL (ref 3.8–4.9)
ALBUMIN/CREAT UR: <11 MG/G CREAT (ref 0–29)
ALBUMIN/GLOB SERPL: 1.4 {RATIO} (ref 1.2–2.2)
ALP SERPL-CCNC: 79 IU/L (ref 48–121)
ALT SERPL-CCNC: 20 IU/L (ref 0–32)
APPEARANCE UR: CLEAR
AST SERPL-CCNC: 15 IU/L (ref 0–40)
BASOPHILS # BLD AUTO: 0 X10E3/UL (ref 0–0.2)
BASOPHILS NFR BLD AUTO: 1 %
BILIRUB SERPL-MCNC: 0.3 MG/DL (ref 0–1.2)
BILIRUB UR QL STRIP: NEGATIVE
BUN SERPL-MCNC: 14 MG/DL (ref 6–24)
BUN/CREAT SERPL: 14 (ref 9–23)
CALCIUM SERPL-MCNC: 9.7 MG/DL (ref 8.7–10.2)
CHLORIDE SERPL-SCNC: 102 MMOL/L (ref 96–106)
CHOLEST SERPL-MCNC: 300 MG/DL (ref 100–199)
CO2 SERPL-SCNC: 21 MMOL/L (ref 20–29)
COLOR UR: YELLOW
CREAT SERPL-MCNC: 1.03 MG/DL (ref 0.57–1)
CREAT UR-MCNC: 26.4 MG/DL
EOSINOPHIL # BLD AUTO: 0.1 X10E3/UL (ref 0–0.4)
EOSINOPHIL NFR BLD AUTO: 3 %
ERYTHROCYTE [DISTWIDTH] IN BLOOD BY AUTOMATED COUNT: 13.9 % (ref 11.7–15.4)
EST. AVERAGE GLUCOSE BLD GHB EST-MCNC: 226 MG/DL
GLOBULIN SER CALC-MCNC: 3.3 G/DL (ref 1.5–4.5)
GLUCOSE SERPL-MCNC: 363 MG/DL (ref 65–99)
GLUCOSE UR QL: ABNORMAL
HBA1C MFR BLD: 9.5 % (ref 4.8–5.6)
HCT VFR BLD AUTO: 42.2 % (ref 34–46.6)
HDLC SERPL-MCNC: 53 MG/DL
HGB BLD-MCNC: 13.3 G/DL (ref 11.1–15.9)
HGB UR QL STRIP: NEGATIVE
IMM GRANULOCYTES # BLD AUTO: 0 X10E3/UL (ref 0–0.1)
IMM GRANULOCYTES NFR BLD AUTO: 0 %
KETONES UR QL STRIP: NEGATIVE
LDLC SERPL CALC-MCNC: 211 MG/DL (ref 0–99)
LEUKOCYTE ESTERASE UR QL STRIP: NEGATIVE
LYMPHOCYTES # BLD AUTO: 1.9 X10E3/UL (ref 0.7–3.1)
LYMPHOCYTES NFR BLD AUTO: 43 %
MCH RBC QN AUTO: 28.5 PG (ref 26.6–33)
MCHC RBC AUTO-ENTMCNC: 31.5 G/DL (ref 31.5–35.7)
MCV RBC AUTO: 90 FL (ref 79–97)
MICRO URNS: ABNORMAL
MICROALBUMIN UR-MCNC: <3 UG/ML
MONOCYTES # BLD AUTO: 0.3 X10E3/UL (ref 0.1–0.9)
MONOCYTES NFR BLD AUTO: 8 %
NEUTROPHILS # BLD AUTO: 2 X10E3/UL (ref 1.4–7)
NEUTROPHILS NFR BLD AUTO: 45 %
NITRITE UR QL STRIP: NEGATIVE
PH UR STRIP: 6 [PH] (ref 5–7.5)
PLATELET # BLD AUTO: 236 X10E3/UL (ref 150–450)
POTASSIUM SERPL-SCNC: 4.3 MMOL/L (ref 3.5–5.2)
PROT SERPL-MCNC: 7.8 G/DL (ref 6–8.5)
PROT UR QL STRIP: NEGATIVE
RBC # BLD AUTO: 4.67 X10E6/UL (ref 3.77–5.28)
SODIUM SERPL-SCNC: 142 MMOL/L (ref 134–144)
SP GR UR: >=1.03 (ref 1–1.03)
TRIGL SERPL-MCNC: 190 MG/DL (ref 0–149)
TSH SERPL DL<=0.005 MIU/L-ACNC: 0.71 UIU/ML (ref 0.45–4.5)
UROBILINOGEN UR STRIP-MCNC: 0.2 MG/DL (ref 0.2–1)
VLDLC SERPL CALC-MCNC: 36 MG/DL (ref 5–40)
WBC # BLD AUTO: 4.4 X10E3/UL (ref 3.4–10.8)

## 2021-08-27 NOTE — PROGRESS NOTES
Please call her that her sugar level is high A1c is 9.5% she should see the diabetic specialist she should start checking blood sugar 3 times a day before each meal and at bedtime she has a microscopic damage to the kidney with microalbumin ratio higher than 11 Hz is 35. She has sugar in the urine. Hemoglobin A1c jumped from 6.3 to 9.5%. Her bad cholesterol jumped to 211 and total cholesterol 300 and triglyceride 190 she should be on atorvastatin 10 mg at bedtime to begin withHer sugar is 363 with normal potassium and normal liver enzymesCBC is normal if she wants to be on atorvastatin let me know to protect her to prevent stroke and heart blockage and circulatory blockage,    Do not take losartan if she has throat swelling with lisinopril so start back on triamterene and HCTZ we had discussed with her daughter also

## 2021-08-30 RX ORDER — ATORVASTATIN CALCIUM 10 MG/1
10 TABLET, FILM COATED ORAL
Qty: 30 TABLET | Refills: 3 | Status: SHIPPED | OUTPATIENT
Start: 2021-08-30 | End: 2021-09-22 | Stop reason: SINTOL

## 2021-08-30 NOTE — PROGRESS NOTES
Pt informed, she is willing to start on Atorvastatin, she also stated that her BS has still been running high even after she takes her insulin. Last night it was 402 this morning it was 398. She would like to know if you can increase her insulin?

## 2021-08-31 ENCOUNTER — TELEPHONE (OUTPATIENT)
Dept: INTERNAL MEDICINE CLINIC | Age: 56
End: 2021-08-31

## 2021-08-31 ENCOUNTER — HOSPITAL ENCOUNTER (EMERGENCY)
Age: 56
Discharge: HOME | End: 2021-08-31
Attending: EMERGENCY MEDICINE
Payer: MEDICARE

## 2021-08-31 VITALS
HEIGHT: 63 IN | TEMPERATURE: 98.3 F | BODY MASS INDEX: 30.12 KG/M2 | RESPIRATION RATE: 18 BRPM | SYSTOLIC BLOOD PRESSURE: 125 MMHG | OXYGEN SATURATION: 98 % | WEIGHT: 170 LBS | DIASTOLIC BLOOD PRESSURE: 94 MMHG | HEART RATE: 113 BPM

## 2021-08-31 DIAGNOSIS — R73.9 HYPERGLYCEMIA: Primary | ICD-10-CM

## 2021-08-31 DIAGNOSIS — N17.9 AKI (ACUTE KIDNEY INJURY) (HCC): ICD-10-CM

## 2021-08-31 DIAGNOSIS — E86.0 DEHYDRATION: ICD-10-CM

## 2021-08-31 LAB
ALBUMIN SERPL-MCNC: 4.1 G/DL (ref 3.5–5)
ALBUMIN/GLOB SERPL: 0.9 {RATIO} (ref 1.1–2.2)
ALP SERPL-CCNC: 81 U/L (ref 45–117)
ALT SERPL-CCNC: 34 U/L (ref 12–78)
ANION GAP SERPL CALC-SCNC: 8 MMOL/L (ref 5–15)
APPEARANCE UR: CLEAR
AST SERPL W P-5'-P-CCNC: 27 U/L (ref 15–37)
ATRIAL RATE: 107 BPM
BACTERIA URNS QL MICRO: NEGATIVE /HPF
BASOPHILS # BLD: 0 K/UL (ref 0–0.1)
BASOPHILS NFR BLD: 1 % (ref 0–1)
BILIRUB SERPL-MCNC: 0.7 MG/DL (ref 0.2–1)
BILIRUB UR QL: NEGATIVE
BUN SERPL-MCNC: 30 MG/DL (ref 6–20)
BUN/CREAT SERPL: 20 (ref 12–20)
CA-I BLD-MCNC: 10.2 MG/DL (ref 8.5–10.1)
CALCULATED P AXIS, ECG09: 53 DEGREES
CALCULATED R AXIS, ECG10: -29 DEGREES
CALCULATED T AXIS, ECG11: 56 DEGREES
CHLORIDE SERPL-SCNC: 99 MMOL/L (ref 97–108)
CO2 SERPL-SCNC: 26 MMOL/L (ref 21–32)
COLOR UR: ABNORMAL
CREAT SERPL-MCNC: 1.49 MG/DL (ref 0.55–1.02)
DIAGNOSIS, 93000: NORMAL
DIFFERENTIAL METHOD BLD: ABNORMAL
EOSINOPHIL # BLD: 0.1 K/UL (ref 0–0.4)
EOSINOPHIL NFR BLD: 1 % (ref 0–7)
ERYTHROCYTE [DISTWIDTH] IN BLOOD BY AUTOMATED COUNT: 13.1 % (ref 11.5–14.5)
GLOBULIN SER CALC-MCNC: 4.8 G/DL (ref 2–4)
GLUCOSE BLD STRIP.AUTO-MCNC: 120 MG/DL (ref 65–117)
GLUCOSE BLD STRIP.AUTO-MCNC: 125 MG/DL (ref 65–117)
GLUCOSE BLD STRIP.AUTO-MCNC: 425 MG/DL (ref 65–117)
GLUCOSE SERPL-MCNC: 389 MG/DL (ref 65–100)
GLUCOSE UR STRIP.AUTO-MCNC: >300 MG/DL
HCT VFR BLD AUTO: 41.4 % (ref 35–47)
HGB BLD-MCNC: 14 G/DL (ref 11.5–16)
HGB UR QL STRIP: NEGATIVE
IMM GRANULOCYTES # BLD AUTO: 0 K/UL (ref 0–0.04)
IMM GRANULOCYTES NFR BLD AUTO: 0 % (ref 0–0.5)
KETONES UR QL STRIP.AUTO: NEGATIVE MG/DL
LEUKOCYTE ESTERASE UR QL STRIP.AUTO: NEGATIVE
LYMPHOCYTES # BLD: 2.5 K/UL (ref 0.8–3.5)
LYMPHOCYTES NFR BLD: 31 % (ref 12–49)
MCH RBC QN AUTO: 28.6 PG (ref 26–34)
MCHC RBC AUTO-ENTMCNC: 33.8 G/DL (ref 30–36.5)
MCV RBC AUTO: 84.7 FL (ref 80–99)
MONOCYTES # BLD: 0.5 K/UL (ref 0–1)
MONOCYTES NFR BLD: 6 % (ref 5–13)
NEUTS SEG # BLD: 5 K/UL (ref 1.8–8)
NEUTS SEG NFR BLD: 61 % (ref 32–75)
NITRITE UR QL STRIP.AUTO: NEGATIVE
NRBC # BLD: 0 K/UL (ref 0–0.01)
NRBC BLD-RTO: 0 PER 100 WBC
P-R INTERVAL, ECG05: 140 MS
PERFORMED BY, TECHID: ABNORMAL
PH UR STRIP: 5 [PH] (ref 5–8)
PLATELET # BLD AUTO: 272 K/UL (ref 150–400)
PMV BLD AUTO: 13.3 FL (ref 8.9–12.9)
POTASSIUM SERPL-SCNC: 4.4 MMOL/L (ref 3.5–5.1)
PROT SERPL-MCNC: 8.9 G/DL (ref 6.4–8.2)
PROT UR STRIP-MCNC: NEGATIVE MG/DL
Q-T INTERVAL, ECG07: 332 MS
QRS DURATION, ECG06: 70 MS
QTC CALCULATION (BEZET), ECG08: 443 MS
RBC # BLD AUTO: 4.89 M/UL (ref 3.8–5.2)
RBC #/AREA URNS HPF: ABNORMAL /HPF (ref 0–5)
SODIUM SERPL-SCNC: 133 MMOL/L (ref 136–145)
SP GR UR REFRACTOMETRY: 1.02 (ref 1–1.03)
UA: UC IF INDICATED,UAUC: ABNORMAL
UROBILINOGEN UR QL STRIP.AUTO: 0.1 EU/DL (ref 0.1–1)
VENTRICULAR RATE, ECG03: 107 BPM
WBC # BLD AUTO: 8.2 K/UL (ref 3.6–11)
WBC URNS QL MICRO: ABNORMAL /HPF (ref 0–4)

## 2021-08-31 PROCEDURE — 74011250636 HC RX REV CODE- 250/636: Performed by: EMERGENCY MEDICINE

## 2021-08-31 PROCEDURE — 81001 URINALYSIS AUTO W/SCOPE: CPT

## 2021-08-31 PROCEDURE — 82962 GLUCOSE BLOOD TEST: CPT

## 2021-08-31 PROCEDURE — 99285 EMERGENCY DEPT VISIT HI MDM: CPT

## 2021-08-31 PROCEDURE — 85025 COMPLETE CBC W/AUTO DIFF WBC: CPT

## 2021-08-31 PROCEDURE — 74011636637 HC RX REV CODE- 636/637: Performed by: EMERGENCY MEDICINE

## 2021-08-31 PROCEDURE — 80053 COMPREHEN METABOLIC PANEL: CPT

## 2021-08-31 PROCEDURE — 96374 THER/PROPH/DIAG INJ IV PUSH: CPT

## 2021-08-31 PROCEDURE — 93005 ELECTROCARDIOGRAM TRACING: CPT

## 2021-08-31 RX ORDER — SODIUM CHLORIDE, SODIUM LACTATE, POTASSIUM CHLORIDE, CALCIUM CHLORIDE 600; 310; 30; 20 MG/100ML; MG/100ML; MG/100ML; MG/100ML
1000 INJECTION, SOLUTION INTRAVENOUS ONCE
Status: COMPLETED | OUTPATIENT
Start: 2021-08-31 | End: 2021-08-31

## 2021-08-31 RX ADMIN — SODIUM CHLORIDE, POTASSIUM CHLORIDE, SODIUM LACTATE AND CALCIUM CHLORIDE 1000 ML: 600; 310; 30; 20 INJECTION, SOLUTION INTRAVENOUS at 19:21

## 2021-08-31 RX ADMIN — INSULIN HUMAN 10 UNITS: 100 INJECTION, SOLUTION PARENTERAL at 19:21

## 2021-08-31 NOTE — ED TRIAGE NOTES
Pt coming from home, c/o blurred vision, dry mouth, dizziness and \"not feeling right. \" PMHx DM II, controlled w/ insulin. VS stable en route, albeit slightly tachycardic and .

## 2021-08-31 NOTE — ED PROVIDER NOTES
EMERGENCY DEPARTMENT HISTORY AND PHYSICAL EXAM      Date: 8/31/2021  Patient Name: Sai Pelletier    History of Presenting Illness     Chief Complaint   Patient presents with    High Blood Sugar       History Provided By: Patient    HPI: Sai Pelletier, 64 y.o. female with PMHx as noted below presents emergency department with chief complaint of hyperglycemia. Patient reports that for the last several days now her blood sugar has been difficult to control in the 4-5 100s. Patient notes that she has been restarted on her insulin last week still does not seem to be able to control her blood sugar. Today she noted to have some lightheadedness and fatigue prompting her to come to the emergency department. Symptoms are moderate in intensity. Pt denies any other alleviating or exacerbating factors. Additionally, pt specifically denies any recent fever, chills, headache, nausea, vomiting, abdominal pain, CP, SOB,  numbness, weakness, BLE swelling, heart palpitations, urinary sxs, diarrhea, constipation, melena, hematochezia, cough, or congestion. PCP: Saturnino Ramírez MD    Current Outpatient Medications   Medication Sig Dispense Refill    atorvastatin (LIPITOR) 10 mg tablet Take 1 Tablet by mouth nightly. 30 Tablet 3    insulin glargine (LANTUS,BASAGLAR) 100 unit/mL (3 mL) inpn 10 Units by SubCUTAneous route nightly. Start 10 units at bedtime. 300 Units 2    insulin lispro (HUMALOG) 100 unit/mL kwikpen 4 Units by SubCUTAneous route three (3) times daily (with meals). 360 Units 2    Insulin Needles, Disposable, 31 gauge x 5/16\" ndle by SubCUTAneous route four (4) times daily. To take lantus at bedtime and humalog three times a day 120 Package 3    triamterene-hydroCHLOROthiazide (DYAZIDE) 37.5-25 mg per capsule Take 1 Capsule by mouth Every morning.  Please call patient to stop losartan HCTZ and start triamterene HCTZ because she has history of throat swelling with lisinopril 30 Capsule 3    Jardiance 25 mg tablet TAKE 1 TABLET BY MOUTH EVERY DAY 30 MINUTES BEFORE BREAKFAST 90 Tablet 0    Spectravite Adult 50 Plus 0.4-300-250 mg-mcg-mcg tab TAKE 1 TABLET BY MOUTH EVERY DAY 90 Tablet 1    sertraline (ZOLOFT) 50 mg tablet Take 1 Tab by mouth daily. 90 Tab 1    montelukast (SINGULAIR) 10 mg tablet Take 1 Tab by mouth daily. 30 Tab 5    omeprazole (PRILOSEC) 40 mg capsule Take 1 Cap by mouth daily. One cap 30 minutes before eating once a food. 30 Cap 5    losartan-hydroCHLOROthiazide (HYZAAR) 50-12.5 mg per tablet Take 1 Tab by mouth every morning. Stop triamterene/hctz  Indications: high blood pressure, diabetes with htn 30 Tab 5    multivitamin (ONE A DAY) tablet Take 1 Tab by mouth daily. (Patient not taking: Reported on 8/25/2021) 30 Tab 5    metoprolol succinate (TOPROL-XL) 25 mg XL tablet Take 1 Tab by mouth daily. 30 Tab 5    cetirizine (ZYRTEC) 10 mg tablet Take 1 Tab by mouth daily. 30 Tab 5    fluticasone propionate (FLONASE) 50 mcg/actuation nasal spray 2 Sprays by Both Nostrils route daily. Indications: inflammation of the nose due to an allergy 1 Bottle 5    Symbicort 160-4.5 mcg/actuation HFAA Take 2 Puffs by inhalation two (2) times a day. 1 Inhaler 5    zolpidem (AMBIEN) 5 mg tablet Take 1 Tab by mouth nightly as needed for Sleep. Max Daily Amount: 5 mg. 30 Tab 2    albuterol (PROVENTIL HFA, VENTOLIN HFA, PROAIR HFA) 90 mcg/actuation inhaler INHALE 2 PUFFS BY MOUTH EVERY 4-6 HOURS AS FOR WHEEZING 8.5 Inhaler 0    acyclovir (ZOVIRAX) 400 mg tablet Take 1 Tab by mouth two (2) times a day.  Biktarvy tab tablet Take 1 Tab by mouth daily. 50mg/200 mg/25 mg per day.  aspirin delayed-release 81 mg tablet Take 1 Tab by mouth daily.          Past History     Past Medical History:  Past Medical History:   Diagnosis Date    Abnormal weight gain 9/8/2020    Abnormal weight gain 9/9/2020    Anemia     Arthritis     Asthma     Cellulitis and abscess of lower extremity 9/9/2020    Chronic back pain 12/8/2020    Chronic kidney disease     Chronic obstructive pulmonary disease (HCC)     Chronic pain     Chronic sinusitis 9/8/2020    Contact dermatitis and eczema due to cause     COPD (chronic obstructive pulmonary disease) ([address] 9/9/2020    Depression     Depressive disorder 9/9/2020    Diabetes ([address]     DM (diabetes mellitus), type 2 ([address] 9/9/2020    DM (diabetes mellitus), type 2, uncontrolled ([address] 9/8/2020    Dyslipidemia 9/9/2020    Dysphagia 9/8/2020    Dysphagia 9/9/2020    Essential hypertension 9/8/2020    Essential hypertension 9/9/2020    GERD (gastroesophageal reflux disease) 9/8/2020    GERD (gastroesophageal reflux disease)     Headache     Hemorrhoids 9/8/2020    HIV [facility]human immunodeficiency virus infection) ([facility]     Hypercholesterolemia     Hypertension     Hypokalemia 9/8/2020    Insomnia 9/8/2020    Insomnia 9/9/2020    Mononeuritis 9/8/2020    Mononeuritis 9/9/2020    Neck pain 9/9/2020    Noncompliance with treatment 9/8/2020    Noncompliance with treatment 9/9/2020    Pain in throat 9/8/2020    Pain in throat 9/9/2020    Pruritus of skin 9/8/2020    TIA (transient ischemic attack) 9/8/2020    TIA (transient ischemic attack) 9/9/2020    TIA (transient ischemic attack) 9/9/2020    Tick bite 9/8/2020    Tick bite 9/9/2020    Tobacco dependence 9/8/2020    Vitamin D deficiency 9/8/2020    Vitamin D deficiency 9/9/2020       Past Surgical History:  Past Surgical History:   Procedure Laterality Date    HX APPENDECTOMY      HX APPENDECTOMY  2012    HX COLONOSCOPY      HX COLONOSCOPY  06/25/2017    HX GI      HX GYN      HX ORTHOPAEDIC      HX TOTAL LAPAROSCOPIC HYSTERECTOMY  2013    VASCULAR SURGERY PROCEDURE UNLIST         Family History:  Family History   Problem Relation Age of Onset    Hypertension Mother     Thyroid Disease Mother     Heart Disease Father     Hypertension Father     Diabetes Father     Diabetes Maternal Grandmother        Social History:  Social History     Tobacco Use    Smoking status: Former Smoker     Packs/day: 1.00     Years: 15.00     Pack years: 15.00     Types: Cigarettes     Quit date: 2019     Years since quittin.6    Smokeless tobacco: Never Used    Tobacco comment: QUIT 4 MONTHS AG0   Vaping Use    Vaping Use: Never used   Substance Use Topics    Alcohol use: Not Currently    Drug use: Never       Allergies: Allergies   Allergen Reactions    Lisinopril Swelling     SWELLING OF THE THROAT    Lisinopril Other (comments)     PT.  States causes swelling lf the throat    Bactrim [Sulfamethoprim] Hives    Elavil Hives    Iron Nausea and Vomiting    Lyrica [Pregabalin] Hives         Review of Systems   Review of Systems  Constitutional: Negative for fever, chills. Positive fatigue. HENT: Negative for congestion, sore throat, rhinorrhea, sneezing and neck stiffness   Eyes: Negative for discharge and redness. Respiratory: Negative for  shortness of breath, wheezing   Cardiovascular: Negative for chest pain, palpitations   Gastrointestinal: Negative for nausea, vomiting, abdominal pain, constipation, diarrhea and blood in stool. Genitourinary: Negative for dysuria, hematuria, flank pain, decreased urine volume, discharge,   Musculoskeletal: Negative for myalgias or joint pain . Skin: Negative for rash or lesions . Neurological: Positive lightheadedness negative weakness,  numbness and headaches. Physical Exam   Physical Exam    GENERAL: alert and oriented, no acute distress  EYES: PEERL, No injection, discharge or icterus. ENT: Mucous membranes dry  NECK: Supple  LUNGS: Airway patent. Non-labored respirations. Breath sounds clear with good air entry bilaterally. HEART: Tachycardic, regular rhythm. . No peripheral edema  ABDOMEN: Non-distended and non-tender, without guarding or rebound.   SKIN:  warm, dry  MSK/EXTREMITIES: Without swelling, tenderness or deformity, symmetric with normal ROM  NEUROLOGICAL: Alert, oriented      Diagnostic Study Results     Labs -     Recent Results (from the past 12 hour(s))   GLUCOSE, POC    Collection Time: 08/31/21  4:32 PM   Result Value Ref Range    Glucose (POC) 425 (H) 65 - 117 mg/dL    Performed by Parvin Rossi    URINALYSIS W/ REFLEX CULTURE    Collection Time: 08/31/21  4:37 PM    Specimen: Urine   Result Value Ref Range    Color Yellow/Straw      Appearance Clear Clear      Specific gravity 1.018 1.003 - 1.030      pH (UA) 5.0 5.0 - 8.0      Protein Negative Negative mg/dL    Glucose >300 (A) Negative mg/dL    Ketone Negative Negative mg/dL    Bilirubin Negative Negative      Blood Negative Negative      Urobilinogen 0.1 0.1 - 1.0 EU/dL    Nitrites Negative Negative      Leukocyte Esterase Negative Negative      UA:UC IF INDICATED Culture not indicated by UA result Culture not indicated by UA result      WBC 0-4 0 - 4 /hpf    RBC 0-5 0 - 5 /hpf    Bacteria Negative Negative /hpf   CBC WITH AUTOMATED DIFF    Collection Time: 08/31/21  4:37 PM   Result Value Ref Range    WBC 8.2 3.6 - 11.0 K/uL    RBC 4.89 3.80 - 5.20 M/uL    HGB 14.0 11.5 - 16.0 g/dL    HCT 41.4 35.0 - 47.0 %    MCV 84.7 80.0 - 99.0 FL    MCH 28.6 26.0 - 34.0 PG    MCHC 33.8 30.0 - 36.5 g/dL    RDW 13.1 11.5 - 14.5 %    PLATELET 039 238 - 099 K/uL    MPV 13.3 (H) 8.9 - 12.9 FL    NRBC 0.0 0.0  WBC    ABSOLUTE NRBC 0.00 0.00 - 0.01 K/uL    NEUTROPHILS 61 32 - 75 %    LYMPHOCYTES 31 12 - 49 %    MONOCYTES 6 5 - 13 %    EOSINOPHILS 1 0 - 7 %    BASOPHILS 1 0 - 1 %    IMMATURE GRANULOCYTES 0 0 - 0.5 %    ABS. NEUTROPHILS 5.0 1.8 - 8.0 K/UL    ABS. LYMPHOCYTES 2.5 0.8 - 3.5 K/UL    ABS. MONOCYTES 0.5 0.0 - 1.0 K/UL    ABS. EOSINOPHILS 0.1 0.0 - 0.4 K/UL    ABS. BASOPHILS 0.0 0.0 - 0.1 K/UL    ABS. IMM.  GRANS. 0.0 0.00 - 0.04 K/UL    DF AUTOMATED     METABOLIC PANEL, COMPREHENSIVE    Collection Time: 08/31/21  4:37 PM   Result Value Ref Range    Sodium 133 (L) 136 - 145 mmol/L    Potassium 4.4 3.5 - 5.1 mmol/L    Chloride 99 97 - 108 mmol/L    CO2 26 21 - 32 mmol/L    Anion gap 8 5 - 15 mmol/L    Glucose 389 (H) 65 - 100 mg/dL    BUN 30 (H) 6 - 20 mg/dL    Creatinine 1.49 (H) 0.55 - 1.02 mg/dL    BUN/Creatinine ratio 20 12 - 20      GFR est AA 44 (L) >60 ml/min/1.73m2    GFR est non-AA 36 (L) >60 ml/min/1.73m2    Calcium 10.2 (H) 8.5 - 10.1 mg/dL    Bilirubin, total 0.7 0.2 - 1.0 mg/dL    AST (SGOT) 27 15 - 37 U/L    ALT (SGPT) 34 12 - 78 U/L    Alk. phosphatase 81 45 - 117 U/L    Protein, total 8.9 (H) 6.4 - 8.2 g/dL    Albumin 4.1 3.5 - 5.0 g/dL    Globulin 4.8 (H) 2.0 - 4.0 g/dL    A-G Ratio 0.9 (L) 1.1 - 2.2     EKG, 12 LEAD, INITIAL    Collection Time: 08/31/21  4:44 PM   Result Value Ref Range    Ventricular Rate 107 BPM    Atrial Rate 107 BPM    P-R Interval 140 ms    QRS Duration 70 ms    Q-T Interval 332 ms    QTC Calculation (Bezet) 443 ms    Calculated P Axis 53 degrees    Calculated R Axis -29 degrees    Calculated T Axis 56 degrees    Diagnosis       Sinus tachycardia  Moderate voltage criteria for LVH, may be normal variant  Nonspecific T wave abnormality  Abnormal ECG  When compared with ECG of 17-MAR-2019 19:43,  Nonspecific T wave abnormality, worse in Anterolateral leads  Confirmed by David Mercedes ([id number]) on 8/31/2021 4:54:37 PM     GLUCOSE, POC    Collection Time: 08/31/21  8:32 PM   Result Value Ref Range    Glucose (POC) 125 (H) 65 - 117 mg/dL    Performed by [email or web address] Carmen Gonsalez, POC    Collection Time: 08/31/21  9:38 PM   Result Value Ref Range    Glucose (POC) 120 (H) 65 - 117 mg/dL    Performed by [facility]        Radiologic Studies -   No orders to display     CT Results  (Last 48 hours)    None        CXR Results  (Last 48 hours)    None            Medical Decision Making     I, Pixie Base, MD am the first provider for this patient and am the attending of record for this patient encounter.     I reviewed the vital signs, available nursing notes, past medical history, past surgical history, family history and social history. Vital Signs-Reviewed the patient's vital signs. Patient Vitals for the past 12 hrs:   Temp Pulse Resp BP SpO2   08/31/21 1650 -- -- -- -- 98 %   08/31/21 1624 98.3 °F (36.8 °C) (!) 113 18 (!) 125/94 98 %           Records Reviewed: Nursing Notes and Old Medical Records    Provider Notes (Medical Decision Making): On presentation, the patient is well appearing, in no acute distress but noted be tachycardic on arrival presents with hyperglycemia, lightheadedness, fatigue. Differential includes dehydration, DKA, HHS, other metabolic abnormality, electrolyte abnormality. Labs were notable for hyperglycemia with mild ADONIS likely prerenal secondary to her hyperglycemia. No signs of DKA. Patient was given IV fluids, insulin with improvement in her blood glucose. On reevaluation patient was no longer tachycardic and noted to be asymptomatic. Nallen stable for discharge at this time. Have instructed her to call her primary care physician in the morning to discuss insulin adjustment. ED Course:   Initial assessment performed. The patients presenting problems have been discussed, and they are in agreement with the care plan formulated and outlined with them. I have encouraged them to ask questions as they arise throughout their visit. Medications   lactated Ringers infusion 1,000 mL (1,000 mL IntraVENous New Bag 8/31/21 1921)   lactated Ringers infusion 1,000 mL (1,000 mL IntraVENous New Bag 8/31/21 1921)   insulin regular (NOVOLIN R, HUMULIN R) injection 10 Units (10 Units IntraVENous Given 8/31/21 1921)         [address] [place] [address] DANILO Escalona's  results have been reviewed with her. She has been counseled regarding her diagnosis.   She verbally conveys understanding and agreement of the signs, symptoms, diagnosis, treatment and prognosis and additionally agrees to follow up as recommended with Dr. Michael Judge MD in 24 - 48 hours. She also agrees with the care-plan and conveys that all of her questions have been answered. I have also put together some discharge instructions for her that include: 1) educational information regarding their diagnosis, 2) how to care for their diagnosis at home, as well a 3) list of reasons why they would want to return to the ED prior to their follow-up appointment, should their condition change. Disposition:  home    PLAN:  1. Discharge Medication List as of 8/31/2021  9:56 PM        2. Follow-up Information     Follow up With Specialties Details Why Contact Info    Niranjan Mcneil MD Internal Medicine Call in 1 day  [address] [facility]  [phone] [address] [facility] EMERGENCY DEPT Emergency Medicine  If symptoms worsen [address] [facility] [address]  [phone]        Return to ED if worse     Diagnosis     Clinical Impression:   1. Hyperglycemia    2. Dehydration    3. ADONIS (acute kidney injury) [facility])        Please note that this dictation was completed with Dragon, computer voice recognition software. Quite often unanticipated grammatical, syntax, homophones, and other interpretive errors are inadvertently transcribed by the computer software. Please disregard these errors. Additionally, please excuse any errors that have escaped final proofreading.

## 2021-08-31 NOTE — TELEPHONE ENCOUNTER
Pt called stating that she is having blurred vision that started this morning and has worsened throughout the day, she is feeling lethargic and nausea, she is having difficulty with speaking and getting her thoughts together. Pt did not sound like her normal self on the phone. She had a co-worker check her glucose and it was 502. I advised for the pt to go to the ER due to her symptoms.

## 2021-09-01 NOTE — ED NOTES
Peripheral IV removed. Pt ambulated A&Ox4, GCS 15 to ED lobby w/ steady gait, accompanied by daughter. In possession of personal belongings and discharge instructions.

## 2021-09-02 RX ORDER — INSULIN GLARGINE 100 [IU]/ML
10 INJECTION, SOLUTION SUBCUTANEOUS
Qty: 6 PEN | Refills: 1 | Status: SHIPPED | OUTPATIENT
Start: 2021-09-02 | End: 2022-07-28

## 2021-09-12 PROBLEM — E11.65 TYPE 2 DIABETES MELLITUS WITH HYPERGLYCEMIA, WITHOUT LONG-TERM CURRENT USE OF INSULIN (HCC): Status: RESOLVED | Noted: 2021-08-25 | Resolved: 2021-09-12

## 2021-09-15 ENCOUNTER — OFFICE VISIT (OUTPATIENT)
Dept: INTERNAL MEDICINE CLINIC | Age: 56
End: 2021-09-15
Payer: MEDICARE

## 2021-09-15 VITALS
SYSTOLIC BLOOD PRESSURE: 130 MMHG | WEIGHT: 166 LBS | OXYGEN SATURATION: 99 % | RESPIRATION RATE: 12 BRPM | HEIGHT: 63 IN | HEART RATE: 72 BPM | DIASTOLIC BLOOD PRESSURE: 90 MMHG | BODY MASS INDEX: 29.41 KG/M2

## 2021-09-15 DIAGNOSIS — N18.31 STAGE 3A CHRONIC KIDNEY DISEASE (HCC): ICD-10-CM

## 2021-09-15 DIAGNOSIS — Z91.199 NONCOMPLIANCE: ICD-10-CM

## 2021-09-15 DIAGNOSIS — B20 HIV INFECTION, UNSPECIFIED SYMPTOM STATUS (HCC): ICD-10-CM

## 2021-09-15 DIAGNOSIS — J44.9 CHRONIC OBSTRUCTIVE PULMONARY DISEASE, UNSPECIFIED COPD TYPE (HCC): ICD-10-CM

## 2021-09-15 DIAGNOSIS — Z87.898 HISTORY OF ANGIOEDEMA: ICD-10-CM

## 2021-09-15 DIAGNOSIS — F51.04 PSYCHOPHYSIOLOGICAL INSOMNIA: Primary | ICD-10-CM

## 2021-09-15 DIAGNOSIS — E78.5 DYSLIPIDEMIA: ICD-10-CM

## 2021-09-15 DIAGNOSIS — E11.65 UNCONTROLLED TYPE 2 DIABETES MELLITUS WITH HYPERGLYCEMIA (HCC): ICD-10-CM

## 2021-09-15 DIAGNOSIS — Z91.199 NONCOMPLIANCE WITH TREATMENT: ICD-10-CM

## 2021-09-15 DIAGNOSIS — F32.A DEPRESSIVE DISORDER: ICD-10-CM

## 2021-09-15 DIAGNOSIS — K21.9 GASTROESOPHAGEAL REFLUX DISEASE WITHOUT ESOPHAGITIS: ICD-10-CM

## 2021-09-15 DIAGNOSIS — I10 ESSENTIAL HYPERTENSION: ICD-10-CM

## 2021-09-15 PROCEDURE — G8752 SYS BP LESS 140: HCPCS | Performed by: INTERNAL MEDICINE

## 2021-09-15 PROCEDURE — 3017F COLORECTAL CA SCREEN DOC REV: CPT | Performed by: INTERNAL MEDICINE

## 2021-09-15 PROCEDURE — G9717 DOC PT DX DEP/BP F/U NT REQ: HCPCS | Performed by: INTERNAL MEDICINE

## 2021-09-15 PROCEDURE — G8417 CALC BMI ABV UP PARAM F/U: HCPCS | Performed by: INTERNAL MEDICINE

## 2021-09-15 PROCEDURE — 99214 OFFICE O/P EST MOD 30 MIN: CPT | Performed by: INTERNAL MEDICINE

## 2021-09-15 PROCEDURE — G8755 DIAS BP > OR = 90: HCPCS | Performed by: INTERNAL MEDICINE

## 2021-09-15 PROCEDURE — 3046F HEMOGLOBIN A1C LEVEL >9.0%: CPT | Performed by: INTERNAL MEDICINE

## 2021-09-15 PROCEDURE — 2022F DILAT RTA XM EVC RTNOPTHY: CPT | Performed by: INTERNAL MEDICINE

## 2021-09-15 PROCEDURE — G8427 DOCREV CUR MEDS BY ELIG CLIN: HCPCS | Performed by: INTERNAL MEDICINE

## 2021-09-15 RX ORDER — GLUCOSAMINE SULFATE 1500 MG
1000 POWDER IN PACKET (EA) ORAL DAILY
COMMUNITY
End: 2022-04-21 | Stop reason: SDUPTHER

## 2021-09-15 RX ORDER — MONTELUKAST SODIUM 10 MG/1
10 TABLET ORAL DAILY
Qty: 90 TABLET | Refills: 1 | Status: SHIPPED | OUTPATIENT
Start: 2021-09-15 | End: 2022-07-28 | Stop reason: SDUPTHER

## 2021-09-15 RX ORDER — FLUCONAZOLE 150 MG/1
150 TABLET ORAL DAILY
Qty: 1 TABLET | Refills: 0 | Status: SHIPPED | OUTPATIENT
Start: 2021-09-15 | End: 2021-09-16

## 2021-09-15 NOTE — PROGRESS NOTES
Kar Archuleta is a 64 y.o. female and presents with Follow Up Chronic Condition, Hypertension, and Diabetes (264 per pt 234 in office )    Ms. Tiffany Bowman came for follow-up. She has type II uncontrolled diabetes mellitus and insulin-dependent. She is getting her antiviral medicine for her HIV medication from the infectious disease specialist.  She follows at [facility] run by [facility].  She used to be noncompliant in between she lost follow-up she does not  the phone when she has abnormal labs or  to discuss medical related education and lab results. , strongly recommended to  the phone, that she is given to us.   I had taken her off from losartan because she had angioedema taking lisinopril started back on triamterene HCTZ and continued on metoprolol today her diastolic is slightly elevated with systolic is controlled she brought some of the blood sugar reading, and she does not check before lunch she checks before breakfast remains around 234 and sometimes her predinner blood sugar remains around 300-400 recently she visited ER for blurring of vision, very poor historian, having history of COPD with history of formal smoking, she told me when her sugar go high she has itching in her private part strongly recommended at her age to see gynecologist, that she has not done she has not seen ophthalmologist for last 2-year, recommended to see ophthalmologist, she told me she needs refill for montelukast, she takes Humalog, 4 units with breakfast and dinner recommended to increase to 6 units with breakfast and 6 units with lunch and 8 units  with dinner on sliding scale and she is taking 12 units of Lantus at bedtime increased to 17 units at bedtime and she should be more compliant for checking blood sugar, she has made appointment with endocrinologist Dr. Ruy Mohan for next month she is already taking Jardiance she could not tolerate glipizide and Metformin,, no good history available no good dietary history available she told me she has stopped eating starch and sugar-containing juice and beverages. I have referred her to diabetic education nutritionist.  No nausea no vomiting. No diarrhea. She has history of reactive depression takes sertraline also takes omeprazole and she takes Biktarvy along with acyclovir having HIV. Recently her creatinine jumped up most likely due to dehydration and elevated blood sugar no negative thoughts has taken Covid vaccine. ,    Review of Systems    Review of Systems   Constitutional: Negative. HENT: Negative for congestion and sore throat. Eyes: Negative for blurred vision. Respiratory: Negative for cough, shortness of breath and wheezing. Cardiovascular: Negative. Gastrointestinal: Negative. Genitourinary: Negative for dysuria and hematuria. Musculoskeletal: Negative. Neurological: Negative for dizziness, tingling, sensory change and headaches. Psychiatric/Behavioral: Negative for depression and substance abuse. The patient is not nervous/anxious.          Past Medical History:   Diagnosis Date    Abnormal weight gain 9/8/2020    Abnormal weight gain 9/9/2020    Anemia     Arthritis     Asthma     Cellulitis and abscess of lower extremity 9/9/2020    Chronic back pain 12/8/2020    Chronic kidney disease     Chronic obstructive pulmonary disease (HCC)     Chronic pain     Chronic sinusitis 9/8/2020    Contact dermatitis and eczema due to cause     COPD (chronic obstructive pulmonary disease) ([facility] 9/9/2020    Depression     Depressive disorder 9/9/2020    Diabetes ([facility]     DM (diabetes mellitus), type 2 ([facility] 9/9/2020    DM (diabetes mellitus), type 2, uncontrolled ([facility] 9/8/2020    Dyslipidemia 9/9/2020    Dysphagia 9/8/2020    Dysphagia 9/9/2020    Essential hypertension 9/8/2020    Essential hypertension 9/9/2020    GERD (gastroesophageal reflux disease) 9/8/2020    GERD (gastroesophageal reflux disease)     Headache     Hemorrhoids 2020    HIV [facility]human immunodeficiency virus infection) [facility]     Hypercholesterolemia     Hypertension     Hypokalemia 2020    Insomnia 2020    Insomnia 2020    Mononeuritis 2020    Mononeuritis 2020    Neck pain 2020    Noncompliance with treatment 2020    Noncompliance with treatment 2020    Pain in throat 2020    Pain in throat 2020    Pruritus of skin 2020    TIA (transient ischemic attack) 2020    TIA (transient ischemic attack) 2020    TIA (transient ischemic attack) 2020    Tick bite 2020    Tick bite 2020    Tobacco dependence 2020    Vitamin D deficiency 2020    Vitamin D deficiency 2020     Past Surgical History:   Procedure Laterality Date    HX APPENDECTOMY      HX APPENDECTOMY      HX COLONOSCOPY      HX COLONOSCOPY  2017    HX GI      HX GYN      HX ORTHOPAEDIC      HX TOTAL LAPAROSCOPIC HYSTERECTOMY  2013    VASCULAR SURGERY PROCEDURE UNLIST       Social History     Socioeconomic History    Marital status: SINGLE     Spouse name: Not on file    Number of children: Not on file    Years of education: Not on file    Highest education level: Not on file   Tobacco Use    Smoking status: Former Smoker     Packs/day: 1.00     Years: 15.00     Pack years: 15.00     Types: Cigarettes     Quit date:      Years since quittin.7    Smokeless tobacco: Never Used   Vaping Use    Vaping Use: Never used   Substance and Sexual Activity    Alcohol use: Not Currently    Drug use: Never   Social History Narrative    ** Merged History Encounter **          Social Determinants of Health     Financial Resource Strain:     Difficulty of Paying Living Expenses:    Food Insecurity:     Worried About Running Out of Food in the Last Year:     Ran Out of Food in the Last Year:    Transportation Needs:     Lack of Transportation (Medical):      Lack of Transportation (Non-Medical): Physical Activity:     Days of Exercise per Week:     Minutes of Exercise per Session:    Stress:     Feeling of Stress :    Social Connections:     Frequency of Communication with Friends and Family:     Frequency of Social Gatherings with Friends and Family:     Attends Hinduism Services:     Active Member of Clubs or Organizations:     Attends Club or Organization Meetings:     Marital Status:      Family History   Problem Relation Age of Onset    Hypertension Mother     Thyroid Disease Mother     Heart Disease Father     Hypertension Father     Diabetes Father     Diabetes Maternal Grandmother      Current Outpatient Medications   Medication Sig Dispense Refill    cholecalciferol (Vitamin D3) 25 mcg (1,000 unit) cap Take 1,000 Units by mouth daily.  montelukast (SINGULAIR) 10 mg tablet Take 1 Tablet by mouth daily. 90 Tablet 1    fluconazole (DIFLUCAN) 150 mg tablet Take 1 Tablet by mouth daily for 1 day. FDA advises cautious prescribing of oral fluconazole in pregnancy. 1 Tablet 0    omeprazole (PRILOSEC) 40 mg capsule TAKE 1 CAP BY MOUTH DAILY. ONE CAP 30 MINUTES BEFORE EATING ONCE A FOOD. 30 Capsule 2    metoprolol succinate (TOPROL-XL) 25 mg XL tablet TAKE 1 TABLET BY MOUTH EVERY DAY 90 Tablet 0    insulin glargine (LANTUS,BASAGLAR) 100 unit/mL (3 mL) inpn 10 Units by SubCUTAneous route nightly. Start 10 units at bedtime. 90-day supply will be  2700 units for 90-day supply (Patient taking differently: 10 Units by SubCUTAneous route nightly. Takes 12 units at bedtime increased to 17 units at bedtime. units at bedtime.) 6 Pen 1    glucose blood VI test strips (ASCENSIA AUTODISC VI, ONE TOUCH ULTRA TEST VI) strip to check blood sugar 3 times a day before each meal and in rotation being on insulin 100 Strip 3    atorvastatin (LIPITOR) 10 mg tablet Take 1 Tablet by mouth nightly.  30 Tablet 3    insulin lispro (HUMALOG) 100 unit/mL kwikpen 4 Units by SubCUTAneous route three (3) times daily (with meals). (Patient taking differently: 5 Units by SubCUTAneous route three (3) times daily (with meals). 6 units with breakfast ,6 units with lunch and 10 units with dinner) 360 Units 2    Insulin Needles, Disposable, 31 gauge x 5/16\" ndle by SubCUTAneous route four (4) times daily. To take lantus at bedtime and humalog three times a day 120 Package 3    triamterene-hydroCHLOROthiazide (DYAZIDE) 37.5-25 mg per capsule Take 1 Capsule by mouth Every morning. Please call patient to stop losartan HCTZ and start triamterene HCTZ because she has history of throat swelling with lisinopril 30 Capsule 3    Jardiance 25 mg tablet TAKE 1 TABLET BY MOUTH EVERY DAY 30 MINUTES BEFORE BREAKFAST 90 Tablet 0    Spectravite Adult 50 Plus 0.4-300-250 mg-mcg-mcg tab TAKE 1 TABLET BY MOUTH EVERY DAY 90 Tablet 1    sertraline (ZOLOFT) 50 mg tablet Take 1 Tab by mouth daily. 90 Tab 1    cetirizine (ZYRTEC) 10 mg tablet Take 1 Tab by mouth daily. 30 Tab 5    fluticasone propionate (FLONASE) 50 mcg/actuation nasal spray 2 Sprays by Both Nostrils route daily. Indications: inflammation of the nose due to an allergy 1 Bottle 5    Symbicort 160-4.5 mcg/actuation HFAA Take 2 Puffs by inhalation two (2) times a day. 1 Inhaler 5    zolpidem (AMBIEN) 5 mg tablet Take 1 Tab by mouth nightly as needed for Sleep. Max Daily Amount: 5 mg. 30 Tab 2    albuterol (PROVENTIL HFA, VENTOLIN HFA, PROAIR HFA) 90 mcg/actuation inhaler INHALE 2 PUFFS BY MOUTH EVERY 4-6 HOURS AS FOR WHEEZING 8.5 Inhaler 0    acyclovir (ZOVIRAX) 400 mg tablet Take 1 Tab by mouth two (2) times a day.  Biktarvy tab tablet Take 1 Tab by mouth daily. 50mg/200 mg/25 mg per day.  aspirin delayed-release 81 mg tablet Take 1 Tab by mouth daily.        Allergies   Allergen Reactions    Lisinopril Swelling     SWELLING OF THE THROAT    Lisinopril Other (comments)     PT.  States causes swelling lf the throat    Bactrim [Sulfamethoprim] Hives    Elavil Hives    Iron Nausea and Vomiting    Lyrica [Pregabalin] Hives       Objective:  Visit Vitals  BP (!) 130/90 (BP 1 Location: Left upper arm, BP Patient Position: Sitting, BP Cuff Size: Adult)   Pulse 72   Resp 12   Ht 5' 3\" (1.6 m)   Wt 166 lb (75.3 kg)   SpO2 99%   BMI 29.41 kg/m²       Physical Exam:   Constitutional: General Appearance: . Well dressed level of Distress: NAD. Psychiatric: Mental Status: normal mood and affect Orientation: to time, place, and person. ,normal eye contact. ???  Poor insight  Head: Head: normocephalic and atraumatic. Eyes: Pupils: PERRLA. Sclerae: non-icteric. Neck: Neck: supple, trachea midline, and no masses. Lymph Nodes: no cervical LAD. Thyroid: no enlargement or nodules and non-tender. Lungs: Respiratory effort: no dyspnea. Auscultation: no wheezing, rales/crackles, or rhonchi and breath sounds normal and good air movement. Cardiovascular: Apical Impulse: not displaced. Heart Auscultation: normal S1 and S2; no murmurs, rubs, or gallops; and RRR. Neck vessels: no carotid bruits. Pulses including femoral / pedal: normal throughout. Abdomen: Bowel Sounds: normal. Inspection and Palpation: no tenderness, guarding, or masses and soft and non-distended. Liver: non-tender and no hepatomegaly. Spleen: non-tender and no splenomegaly. Musculoskeletal[de-identified] Extremities: no edema,no varicosities. No Calf tenderness. Neurologic: Gait and Station: normal gait and station. Motor Strength normal right and left. Skin: Inspection and palpation: no rash, lesions, or ulcer.        Results for orders placed or performed during the hospital encounter of 08/31/21   URINALYSIS W/ REFLEX CULTURE    Specimen: Urine   Result Value Ref Range    Color Yellow/Straw      Appearance Clear Clear      Specific gravity 1.018 1.003 - 1.030      pH (UA) 5.0 5.0 - 8.0      Protein Negative Negative mg/dL    Glucose >300 (A) Negative mg/dL    Ketone Negative Negative mg/dL    Bilirubin Negative Negative Blood Negative Negative      Urobilinogen 0.1 0.1 - 1.0 EU/dL    Nitrites Negative Negative      Leukocyte Esterase Negative Negative      UA:UC IF INDICATED Culture not indicated by UA result Culture not indicated by UA result      WBC 0-4 0 - 4 /hpf    RBC 0-5 0 - 5 /hpf    Bacteria Negative Negative /hpf   CBC WITH AUTOMATED DIFF   Result Value Ref Range    WBC 8.2 3.6 - 11.0 K/uL    RBC 4.89 3.80 - 5.20 M/uL    HGB 14.0 11.5 - 16.0 g/dL    HCT 41.4 35.0 - 47.0 %    MCV 84.7 80.0 - 99.0 FL    MCH 28.6 26.0 - 34.0 PG    MCHC 33.8 30.0 - 36.5 g/dL    RDW 13.1 11.5 - 14.5 %    PLATELET 574 514 - 777 K/uL    MPV 13.3 (H) 8.9 - 12.9 FL    NRBC 0.0 0.0  WBC    ABSOLUTE NRBC 0.00 0.00 - 0.01 K/uL    NEUTROPHILS 61 32 - 75 %    LYMPHOCYTES 31 12 - 49 %    MONOCYTES 6 5 - 13 %    EOSINOPHILS 1 0 - 7 %    BASOPHILS 1 0 - 1 %    IMMATURE GRANULOCYTES 0 0 - 0.5 %    ABS. NEUTROPHILS 5.0 1.8 - 8.0 K/UL    ABS. LYMPHOCYTES 2.5 0.8 - 3.5 K/UL    ABS. MONOCYTES 0.5 0.0 - 1.0 K/UL    ABS. EOSINOPHILS 0.1 0.0 - 0.4 K/UL    ABS. BASOPHILS 0.0 0.0 - 0.1 K/UL    ABS. IMM. GRANS. 0.0 0.00 - 0.04 K/UL    DF AUTOMATED     METABOLIC PANEL, COMPREHENSIVE   Result Value Ref Range    Sodium 133 (L) 136 - 145 mmol/L    Potassium 4.4 3.5 - 5.1 mmol/L    Chloride 99 97 - 108 mmol/L    CO2 26 21 - 32 mmol/L    Anion gap 8 5 - 15 mmol/L    Glucose 389 (H) 65 - 100 mg/dL    BUN 30 (H) 6 - 20 mg/dL    Creatinine 1.49 (H) 0.55 - 1.02 mg/dL    BUN/Creatinine ratio 20 12 - 20      GFR est AA 44 (L) >60 ml/min/1.73m2    GFR est non-AA 36 (L) >60 ml/min/1.73m2    Calcium 10.2 (H) 8.5 - 10.1 mg/dL    Bilirubin, total 0.7 0.2 - 1.0 mg/dL    AST (SGOT) 27 15 - 37 U/L    ALT (SGPT) 34 12 - 78 U/L    Alk.  phosphatase 81 45 - 117 U/L    Protein, total 8.9 (H) 6.4 - 8.2 g/dL    Albumin 4.1 3.5 - 5.0 g/dL    Globulin 4.8 (H) 2.0 - 4.0 g/dL    A-G Ratio 0.9 (L) 1.1 - 2.2     GLUCOSE, POC   Result Value Ref Range    Glucose (POC) 425 (H) 65 - 117 mg/dL Performed by Fuad Lemus    GLUCOSE, POC   Result Value Ref Range    Glucose (POC) 125 (H) 65 - 117 mg/dL    Performed by Fuad Lemus    GLUCOSE, POC   Result Value Ref Range    Glucose (POC) 120 (H) 65 - 117 mg/dL    Performed by [facility]    EKG, 12 LEAD, INITIAL   Result Value Ref Range    Ventricular Rate 107 BPM    Atrial Rate 107 BPM    P-R Interval 140 ms    QRS Duration 70 ms    Q-T Interval 332 ms    QTC Calculation (Bezet) 443 ms    Calculated P Axis 53 degrees    Calculated R Axis -29 degrees    Calculated T Axis 56 degrees    Diagnosis       Sinus tachycardia  Moderate voltage criteria for LVH, may be normal variant  Nonspecific T wave abnormality  Abnormal ECG  When compared with ECG of 17-MAR-2019 19:43,  Nonspecific T wave abnormality, worse in Anterolateral leads  Confirmed by Delfino Pike ([id number]) on 8/31/2021 4:54:37 PM         Assessment/Plan:      ICD-10-CM ICD-9-CM    1. Psychophysiological insomnia  F51.04 307.42    2. Essential hypertension  O57 882.1 METABOLIC PANEL, BASIC   3. Uncontrolled type 2 diabetes mellitus with hyperglycemia (HCC)  E11.65 250.02 REFERRAL TO DIABETIC EDUCATION      REFERRAL TO OPHTHALMOLOGY      METABOLIC PANEL, BASIC   4. Dyslipidemia  E78.5 272.4    5. Depressive disorder  F32.9 311    6. Gastroesophageal reflux disease without esophagitis  K21.9 530.81    7. Chronic obstructive pulmonary disease, unspecified COPD type ([facility]  J44.9 496    8. HIV infection, unspecified symptom status ([facility]  B20 V08    9. Stage 3a chronic kidney disease (HCC)  A64.86 528.4 METABOLIC PANEL, BASIC   10. Noncompliance  Z91.19 V15.81    11. Noncompliance with treatment  Z91.19 V15.81    12.  History of angioedema  Z87.898 V13.89      Orders Placed This Encounter    METABOLIC PANEL, BASIC    REFERRAL TO DIABETIC EDUCATION     Referral Priority:   Routine     Referral Type:   Consultation     Referral Reason:   Specialty Services Required     Number of Visits Requested:   1    REFERRAL TO OPHTHALMOLOGY     Referral Priority:   Routine     Referral Type:   Consultation     Referral Reason:   Specialty Services Required     Referred to Provider:   Maggy Gallegos MD     Requested Specialty:   Ophthalmology     Number of Visits Requested:   1    cholecalciferol (Vitamin D3) 25 mcg (1,000 unit) cap     Sig: Take 1,000 Units by mouth daily.  montelukast (SINGULAIR) 10 mg tablet     Sig: Take 1 Tablet by mouth daily. Dispense:  90 Tablet     Refill:  1    fluconazole (DIFLUCAN) 150 mg tablet     Sig: Take 1 Tablet by mouth daily for 1 day. FDA advises cautious prescribing of oral fluconazole in pregnancy. Dispense:  1 Tablet     Refill:  0     Hypertension she cannot take ACE inhibitor of group of medication causing angioedema and she had angioedema taking lisinopril so I have taken her off from losartan, started back on triamterene HCTZ 37.5/25 mg/day and metoprolol ER 25 mg/day. Her diastolic is slightly elevated , recommended to keep blood pressure log and if it remains elevated and if pulse count allows I will increase metoprolol to 50 mg/day or I will add amlodipine. I cannot give her ACE or ARB. Type 2 diabetes mellitus uncontrolled in the past she was noncompliant now she has been taking Humalog and Lantus, recently she visited ER for blurry vision in her, random blood sugar was very high initially she was not checking now she has started  checking blood sugar at least twice a day, she brought some of the readings which shows fasting hyperglycemia and random hyperglycemia, today in the office it is 234. With her glucometer it was 254. Recommended to increase Humalog 6 units with breakfast and 6 units with lunch and she told me she cannot check blood sugar at lunch at her work and is recommended to increase Humalog to 8 units with dinner because she told me her dinner is the large portion of the meal Lantus increased from 12 to 17 units at bedtime.   She has not consulted ophthalmologist for last 2 years,I have referred her for comprehensive diabetic eye exam.  Continue aspirin 81 mg/day. Continue statin. Walking minimum 2 miles per day. Diet less than 1800 ADA diet. She was drinking juice and starch containing diet now she told me she has stopped drinking juice and soda. Not a reliable historian. Recommended to be compliant. Continue Jardiance 25 mg once a day she could not tolerate Metformin causing diarrhea and could not take glipizide. Dyslipidemia continue atorvastatin 10 mg at bedtime to prevent TIA or stroke and myocardial infarction. Diet low in fat. HIV taking Biktarvy and acyclovir follows ID specialist at [facility] height awaiting the notes I gave fax number of my office.,    COPD most likely due to history of smoking continue rescue and maintenance inhaler. Reactive depression currently no negative thoughts continue sertraline current dose. GERD continue omeprazole. Allergic rhinitis continue montelukast.    Having itching in her private part referred her to gynecology strongly recommended to do GYN checkup explained that, 1 dose of fluconazole may not be enough she needs thorough GYN checkup. Education given.    , Insomnia taking zolpidem. Recently she had elevated creatinine most likely due to dehydration and  Hyperglycemia, we will repeat her labs    Follow-up in 3 months diabetic and hypoglycemia education given refer her to diabetic education nutritionist, ophthalmologist, gynecologist.    Her phone number and address of gynecologist that she told me she will make appointment by herself so I have not put a referral in the computer,.    continue present plan, routine labs ordered, call if any problems    There are no Patient Instructions on file for this visit. Follow-up and Dispositions    · Return in about 3 months (around 12/15/2021) for follow up for chronic condition,.

## 2021-09-15 NOTE — PROGRESS NOTES
Chief Complaint   Patient presents with    Follow Up Chronic Condition    Hypertension    Diabetes     264 per pt 234 in office        1. Have you been to the ER, urgent care clinic since your last visit? Hospitalized since your last visit?08/31 [facility] ER blurred vision     2. Have you seen or consulted any other health care providers outside of the [address] [place] [address] since your last visit? Include any pap smears or colon screening.  No     Visit Vitals  BP (!) 121/91 (BP 1 Location: Right upper arm, BP Patient Position: Sitting, BP Cuff Size: Adult)   Pulse 97   Resp 12   Ht 5' 3\" (1.6 m)   Wt 166 lb (75.3 kg)   SpO2 99%   BMI 29.41 kg/m²

## 2021-09-22 ENCOUNTER — CLINICAL SUPPORT (OUTPATIENT)
Dept: DIABETES SERVICES | Age: 56
End: 2021-09-22

## 2021-09-22 ENCOUNTER — TELEPHONE (OUTPATIENT)
Dept: INTERNAL MEDICINE CLINIC | Age: 56
End: 2021-09-22

## 2021-09-22 DIAGNOSIS — E11.00 TYPE 2 DIABETES MELLITUS WITH HYPEROSMOLAR NONKETOTIC HYPERGLYCEMIA (HCC): Primary | ICD-10-CM

## 2021-09-22 RX ORDER — PRAVASTATIN SODIUM 10 MG/1
TABLET ORAL
Qty: 30 TABLET | Refills: 1 | Status: SHIPPED | OUTPATIENT
Start: 2021-09-22 | End: 2021-10-19 | Stop reason: SDUPTHER

## 2021-09-22 NOTE — PROGRESS NOTES
[facility]  Diabetes Self-Management Education & Support Program  Pre-program Assessment    Reason for Referral: Diabetes Education  Referral Source: Val Mahoney MD  Services requested: DSMES    ASSESSMENT    From my perspective, the participant would benefit from [facility] specifically related to Reducing risks, Healthy eating, Monitoring, Physical activity, Taking medications, Healthy coping and Problem solving. Will adapt DSMES program to build on participant's preparedness score as noted in the Diabetes Skills, Confidence, and Preparedness Index. During the program, we will focus on providing DSMES that specifically addresses participant's interest in Reducing risks, Healthy eating, Taking medications and Healthy coping, as shown by their reported readiness to change. The participant would be best served by attending weekly group class series. Ms. Camron Navarro was open to sharing her overwhelming feelings of living with chronic conditions. She has two chronic conditions that need daily monitoring of symptoms and adapting routine to manage glucose levels. She became tearful when reflecting on her father, who recently passed a way over the summer. She reports experiencing tingling and numbness in her fingers. She may be experiences s/sx of neuropathy. She has not seen a neurologist. However, she is scheduled to see the Endocrinologist on 10/27/2021. She asked questions about diabetes and lack of sexual responsiveness. This RDN provided participant with handout on DM and sexual responsiveness to share with her partner. Diabetes Self-Management Education Follow-up Visit: October 6, 2021       Clinical Presentation  Zakia Herbert is a 64 y.o.  female referred for diabetes self-management education. Participant has Type 2 DM on insulin for 1-10 years. Family history positive for diabetes.  Patient reports not receiving DSMES services in the past. Most recent A1c value:   Lab Results   Component Value Date/Time    Hemoglobin A1c 9.5 (H) 08/25/2021 11:44 AM    Hemoglobin A1c, External 6.2 03/20/2020 12:00 AM       Diabetes-related medical history:  Acute complications  DKA  Neurological complications  Peripheral neuropathy  Microvascular disease  Nephropathy  Macrovascular disease  Cerebral vascular accident  Other associated conditions     Depression    Diabetes-related medications:  Current dosing:   Key Antihyperglycemic Medications             insulin glargine (LANTUS,BASAGLAR) 100 unit/mL (3 mL) inpn 10 Units by SubCUTAneous route nightly. Start 10 units at bedtime. 90-day supply will be  2700 units for 90-day supply    insulin lispro (HUMALOG) 100 unit/mL kwikpen 4 Units by SubCUTAneous route three (3) times daily (with meals). Jardiance 25 mg tablet TAKE 1 TABLET BY MOUTH EVERY DAY 30 MINUTES BEFORE BREAKFAST          Blood Pressure Management  Key ACE/ARB Medications     Patient is on no ACE or ARB meds. Lipid Management  Key Antihyperlipidemia Meds             atorvastatin (LIPITOR) 10 mg tablet Take 1 Tablet by mouth nightly. Clot Prevention  Key Anti-Platelet Anticoagulant Meds             aspirin delayed-release 81 mg tablet Take 1 Tab by mouth daily. Learning Assessment  Learning objectives Educator assessment (9/22/2021)   Diabetes Disease Process  The participant can   A) describe diabetes in basic terms;   B) state the type of diabetes they have; &   C) state accepted blood glucose targets. Healthy Eating  The participant can   A) identify carbohydrate foods; &   B) accurately read food labels. Being Active  The participant can  A) state the benefits of physical activity;  B) report their current PA practices;  C) identify PA they would consider incorporating in their lives; &  D) develop an implementation plan.      Monitoring  The participant can  A) operate their blood glucose meter; &  B) describe how they log their blood glucoses to share with their provider. Taking Medications  The participant can  A) name their diabetes medications;  B) state the purpose and dose;  C) note side effects; &  D) describe proper storage, disposal & transport (if appropriate). Healthy Coping  The participant can    A) describe their response to diabetes diagnosis; B) describe their specific coping mechanisms;    C) identify supportive people and/or other resources that positively support their diabetes self-care and health. Reducing Risks  The participant can describe the preventive measures used by providers to promote health and prevent diabetes complications. Problem Solving  The participant can   A) identify signs, symptoms & treatment of hypoglycemia;   B) identify signs, symptoms & treatment of hyperglycemia;  C) describe their sick day plan; &  D) identify BG patterns to discuss with their provider. No  Yes  No        No  Yes        Yes  Yes  Yes  Yes        Yes  She brought her glucose meter in for review. It was not set up correctly; the time was off.       Yes  Yes  Yes  Yes        Yes  Yes, she does not have support system established  No        Yes          No  No  No  No     Characteristics to Learning   Barriers to Learning   [] Cognitive loss  [] Mental retardation   [] Intellectual delay/cognitive impairment  [] Psychiatric disorder  [] Visually impaired  [] Hearing loss                 [] Low literacy (difficulty with written text)  [] Low numeracy (difficulty with mathematical information  [] Low health literacy (difficulty with understanding health information & services  [] Language  [] Functional limitation  [] Pain   [] Financial  [] Transportation  [] None   Favorite Ways to Learn   [] Lecture  [] Slides  [] Reading [] Video-Internet  [] Cassettes/CDs/MP3's  [] Interactive Small Groups [] Other       Behavioral Assessment  Current self-care practices  Educator assessment (9/22/2021)   Healthy Eating  Current practices  24-hour Dietary Recall:  Breakfast: Glucerna Hunger Smart  Lunch: [place] yogurt,salad with cucumbers, tomatotes and artichoke hearts  Dinner: Hot dog x2 c one one bun  Beverages: water and scotty drops  Alcohol: none     Would benefit from DSMES related to Healthy Eating: Yes      Eats a carbohydrate controlled diet: No      Stage of change: Preparation   Being Active  Current practices  How many days during the past week have you performed physical activity where your heart beats faster and your breathing is harder than normal for 30 minutes or more? 3 days    How many days in a typical week do you perform activity such as this?  3 days     Would benefit from [facility] related to Being Active: Yes      Exercises 150 minutes/week: No, She is active and likes to dance. Stage of change: Action     Monitoring  Current practices  Do you monitor your blood sugar? Yes    How often do you monitor? 4x/day    What are the range of readings? Breakfast: 238-270 mg/dL  Lunch: 133 mg/dL  Dinner: 283-350 mg/dL   Bedtime: 280-350 mg/dL    Do you know your last A1c measurement? Yes    Do you know the meaning of the A1c? No     Would benefit from [facility] related to Monitoring: Yes      Uses BG readings to establish trends and understand BG patterns: No      Stage of change: Preparation   Taking Medication  Current practices  Do you understand what your diabetes medications do? Yes    How often do you miss doses of your diabetes medications? 1-3 times per week, She shared that she has difficulty breaking away from her \"machine\" at work to monitor and correct her BG levels. Can you afford your diabetes medications?  Yes   Would benefit from [facility] related to Taking Medication: Yes      Takes medications consistently to receive full benefit: Yes      Stage of change: Maintenance       Healthy Coping   Current state  Diabetes Skills, Confidence and Preparedness Index:    Overall SCPI score: 4.4   Skills Score: 4.9  Confidence Score: 2.4  Preparedness Score: 5.9   Would benefit from DSMES related to Healthy Coping: Yes, provided online resource for talk therapy: [employer]      Identifies specific people, organizations,etc, that actively support their diabetes self-care efforts: No, 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 Hepatitis: There is no immunization history for the selected administration types on file for this patient. Examinations:  Diabetic Foot and Eye Exam HM Status   Topic Date Due    Diabetic Foot Care  Never done    Eye Exam  Never done        Dental exam: She is endentulous, has and exam scheduled for November to evaluate for dentures    Foot exam: She does not    Heart Protection:  BP Readings from Last 2 Encounters:   09/15/21 (!) 130/90   08/31/21 (!) 125/94        Lab Results   Component Value Date/Time    LDL, calculated 211 (H) 08/25/2021 11:44 AM        Kidney Protection:  Lab Results   Component Value Date/Time    Microalb/Creat ratio (ug/mg creat.) <11 08/25/2021 11:44 AM        Would benefit from [facility] related to Reducing Risks: Yes      Actively participates in decision-making with provider regarding secondary prevention:  Yes      Stage of change: Action   Problem Solving  Current state  Hypoglycemia Management:  What are signs and symptoms of hypoglycemia that you experience? Pt reported being unaware of s/s of hypoglycemia    How do you prevent hypoglycemia? Pt reported being unaware of how to prevent hypoglycemia    How do you treat hypoglycemia?  Pt reported being unaware of how to treat hypoglycemia    Hyperglycemia Management:  What are signs and symptoms of hyperglycemia that you experience? Pt reported being unaware of s/s of hyperglycemia    How can you prevent hyperglycemia? Pt reported being unaware of how to prevent hyperglycemia    Sick Day Management:  What do you do differently on sick days? Pt reported being unaware of self-management on sick days    Pattern Management:  Do you notice blood glucose patterns when you look at the readings in your meter or logbook? No    How do you use the blood glucose readings from your meter or logbook? Pt reported being unaware of pattern management skills     Would benefit from [facility] related to Problem Solving: Yes      Articulates appropriate strategies to address hypoglycemia, hyperglycemia, sick day care and BG pattern: No      Stage of change: Preparation     Note: Content derived from the American Association of Diabetes Educators' Diabetes Education Curriculum: A Guide to Successful Self-Management (3rd edition)      Francisco Lopez RD on 9/22/2021 at 2:06 PM    Encounter date: 9/22/2021  Time in appointment: 30 minutes    Overall SCPI score: 4.4   Skills Score: 4.9  Confidence Score: 2.4  Preparedness Score: 5.9      Skills/Knowledge Questions  1. I know how to plan meals that have the best balance between carbohydrates, proteins and vegetables. 1  2. I know how my diabetes medications (pills, injectables and/or insulin) work in my body. 6  3. I know when to check my blood sugar if I want to see how my body responded to a meal. 5  4. I know when to check my blood sugars to determine if my medication or insulin doses are correct. 6  5. I know what to do to prevent a low blood sugar when I exercise (either before, during, or after). 6  6. When I am sick, I know what to do differently with my diabetes management. 2  7. I know how stress can affect my diabetes management. 6  8. When I look at my blood sugars over a given week, I can explain what my blood sugar pattern is. 6  9.  I know what my target levels are for A1c, blood pressure and cholesterol. 6  Confidence Questions  1. I am confident that I can plan balanced meals and snacks. 2  2. I am confident that I can manage my stress. 2  3. I am confident that I can prevent a low blood sugar during or after exercise. 2  4. I am confident that the next time I eat out, I will be able to choose foods that best keep my blood sugars in target. 1  5. I am confident I can include exercise into my schedule. 6  6. I am confident that I can use my daily blood sugars to adjust my diet, my activity, and/or my insulin. 2  7. When something out of my normal routine happens, I am confident that I can problem-solve and keep my diabetes on track. 2  Preparedness Questions  1. Within the next month, I will begin to eat more balanced meals and snacks. 6  2. Within the next month, I will choose an exercise activity and I will start fitting it into my schedule. 6  3. Within the next month, I will make a list of stress management options that work for me. 6  4. Within the next month, I will consistently plan ahead to prevent low blood sugars. 6  5. Within the next month, I will start adjusting my insulin doses on my own. 5  6. Within the next month, I will begin making changes to my diabetes management based on my daily blood sugars (eg - eating, activity and/or insulin). 5  7. Within the next month, I will begin making changes to my diabetes management to meet my overall goals (eg - eating, activity and/or insulin).  7

## 2021-09-29 ENCOUNTER — TELEPHONE (OUTPATIENT)
Dept: INTERNAL MEDICINE CLINIC | Age: 56
End: 2021-09-29

## 2021-09-29 NOTE — TELEPHONE ENCOUNTER
Pt called with c/o of pain in her legs,and she states her sugar is still running high even after taking the insulin.

## 2021-10-19 RX ORDER — PRAVASTATIN SODIUM 10 MG/1
TABLET ORAL
Qty: 90 TABLET | Refills: 0 | Status: SHIPPED | OUTPATIENT
Start: 2021-10-19 | End: 2021-10-27

## 2021-10-27 ENCOUNTER — OFFICE VISIT (OUTPATIENT)
Dept: ENDOCRINOLOGY | Age: 56
End: 2021-10-27
Payer: MEDICARE

## 2021-10-27 VITALS
HEART RATE: 102 BPM | SYSTOLIC BLOOD PRESSURE: 122 MMHG | WEIGHT: 161 LBS | TEMPERATURE: 96.8 F | HEIGHT: 63 IN | DIASTOLIC BLOOD PRESSURE: 96 MMHG | OXYGEN SATURATION: 99 % | BODY MASS INDEX: 28.53 KG/M2

## 2021-10-27 DIAGNOSIS — E11.65 TYPE 2 DIABETES MELLITUS WITH HYPERGLYCEMIA, WITH LONG-TERM CURRENT USE OF INSULIN (HCC): Primary | ICD-10-CM

## 2021-10-27 DIAGNOSIS — E11.65 UNCONTROLLED TYPE 2 DIABETES MELLITUS WITH HYPERGLYCEMIA (HCC): ICD-10-CM

## 2021-10-27 DIAGNOSIS — B37.31 VAGINAL YEAST INFECTION: ICD-10-CM

## 2021-10-27 DIAGNOSIS — Z79.4 TYPE 2 DIABETES MELLITUS WITH HYPERGLYCEMIA, WITH LONG-TERM CURRENT USE OF INSULIN (HCC): Primary | ICD-10-CM

## 2021-10-27 DIAGNOSIS — E78.2 MIXED HYPERLIPIDEMIA: ICD-10-CM

## 2021-10-27 LAB
BILIRUB UR QL STRIP: NEGATIVE
GLUCOSE POC: 326 MG/DL
GLUCOSE UR-MCNC: NORMAL MG/DL
KETONES P FAST UR STRIP-MCNC: NEGATIVE MG/DL
PH UR STRIP: 5.5 [PH] (ref 4.6–8)
PROT UR QL STRIP: NEGATIVE
SP GR UR STRIP: 1 (ref 1–1.03)
UA UROBILINOGEN AMB POC: NORMAL (ref 0.2–1)
URINALYSIS CLARITY POC: CLEAR
URINALYSIS COLOR POC: YELLOW
URINE BLOOD POC: NORMAL
URINE LEUKOCYTES POC: NEGATIVE
URINE NITRITES POC: NEGATIVE

## 2021-10-27 PROCEDURE — G8755 DIAS BP > OR = 90: HCPCS | Performed by: INTERNAL MEDICINE

## 2021-10-27 PROCEDURE — 82962 GLUCOSE BLOOD TEST: CPT | Performed by: INTERNAL MEDICINE

## 2021-10-27 PROCEDURE — G9717 DOC PT DX DEP/BP F/U NT REQ: HCPCS | Performed by: INTERNAL MEDICINE

## 2021-10-27 PROCEDURE — G8752 SYS BP LESS 140: HCPCS | Performed by: INTERNAL MEDICINE

## 2021-10-27 PROCEDURE — 2022F DILAT RTA XM EVC RTNOPTHY: CPT | Performed by: INTERNAL MEDICINE

## 2021-10-27 PROCEDURE — 3017F COLORECTAL CA SCREEN DOC REV: CPT | Performed by: INTERNAL MEDICINE

## 2021-10-27 PROCEDURE — G8417 CALC BMI ABV UP PARAM F/U: HCPCS | Performed by: INTERNAL MEDICINE

## 2021-10-27 PROCEDURE — 81003 URINALYSIS AUTO W/O SCOPE: CPT | Performed by: INTERNAL MEDICINE

## 2021-10-27 PROCEDURE — G8427 DOCREV CUR MEDS BY ELIG CLIN: HCPCS | Performed by: INTERNAL MEDICINE

## 2021-10-27 PROCEDURE — 99204 OFFICE O/P NEW MOD 45 MIN: CPT | Performed by: INTERNAL MEDICINE

## 2021-10-27 PROCEDURE — 3046F HEMOGLOBIN A1C LEVEL >9.0%: CPT | Performed by: INTERNAL MEDICINE

## 2021-10-27 RX ORDER — PIOGLITAZONEHYDROCHLORIDE 30 MG/1
30 TABLET ORAL DAILY
Qty: 90 TABLET | Refills: 1 | Status: SHIPPED | OUTPATIENT
Start: 2021-10-27 | End: 2022-01-06 | Stop reason: SDUPTHER

## 2021-10-27 RX ORDER — ATORVASTATIN CALCIUM 40 MG/1
40 TABLET, FILM COATED ORAL
Qty: 90 TABLET | Refills: 1 | Status: SHIPPED | OUTPATIENT
Start: 2021-10-27 | End: 2022-01-06 | Stop reason: SDUPTHER

## 2021-10-27 RX ORDER — INSULIN LISPRO 100 [IU]/ML
INJECTION, SOLUTION INTRAVENOUS; SUBCUTANEOUS
Qty: 9 ADJUSTABLE DOSE PRE-FILLED PEN SYRINGE | Refills: 1 | Status: SHIPPED | OUTPATIENT
Start: 2021-10-27 | End: 2022-01-06 | Stop reason: SDUPTHER

## 2021-10-27 RX ORDER — LANCETS
EACH MISCELLANEOUS
Qty: 300 EACH | Refills: 2 | Status: SHIPPED | OUTPATIENT
Start: 2021-10-27 | End: 2022-01-06 | Stop reason: SDUPTHER

## 2021-10-27 RX ORDER — INSULIN GLARGINE 100 [IU]/ML
INJECTION, SOLUTION SUBCUTANEOUS
Qty: 9 ADJUSTABLE DOSE PRE-FILLED PEN SYRINGE | Refills: 1 | Status: SHIPPED | OUTPATIENT
Start: 2021-10-27 | End: 2022-01-06 | Stop reason: SDUPTHER

## 2021-10-27 RX ORDER — FLUCONAZOLE 150 MG/1
150 TABLET ORAL DAILY
Qty: 1 TABLET | Refills: 0 | Status: SHIPPED | OUTPATIENT
Start: 2021-10-27 | End: 2021-10-28

## 2021-10-27 RX ORDER — BLOOD SUGAR DIAGNOSTIC
STRIP MISCELLANEOUS
Qty: 300 STRIP | Refills: 2 | Status: SHIPPED | OUTPATIENT
Start: 2021-10-27 | End: 2022-01-06 | Stop reason: SDUPTHER

## 2021-10-27 RX ORDER — PEN NEEDLE, DIABETIC 30 GX3/16"
NEEDLE, DISPOSABLE MISCELLANEOUS
Qty: 200 EACH | Refills: 1 | Status: SHIPPED | OUTPATIENT
Start: 2021-10-27 | End: 2022-01-06 | Stop reason: SDUPTHER

## 2021-10-27 NOTE — PROGRESS NOTES
History and Physical    Patient: Emilee Cotter MRN: [id number]  SSN: [id number]    YOB: 1965  Age: 64 y.o. Sex: female      Subjective:      Emilee Cotter is a 64 y.o. female with past medical history of HIV, hypertension, hyperlipidemia, COPD is sent to me by primary care physician Dr. Miryam Bryan for type 2 diabetes mellitus. Patient was diagnosed with diabetes in 2009. Her glucose control has been fluctuating from good to very bad. Currently on Lantus 17 units at night, Humalog 14 units 3 times a day before meals, Jardiance 25 mg daily. She is having frequent vaginal yeast infection and pain in her genital area since starting Jardiance. No urinary tract infections. She is reporting compliance with medications. She tells me she checks blood glucose 5 times a day but she did not bring her glucometer. She tells me all her readings are high. She is describing appropriate way of storing and administering insulin. She eats 3 meals per day. Does not drink any sugary beverages, does not eat sweets. Currently depressed because she lost her father a few months back.     Glucometer reading: Did not bring glucometer today    · Diagnosis: 2009  · Current treatment: Lantus 17 units at night, Humalog 14 units 3 times a day, Jardiance 25 mg daily  · Past treatment: metformin (diarrhea, severe), glipizide (not working)  · Glucose checks: 5 times a day  · Hyperglycemia: yes  · Hypoglycemia: no  · Meals per day: 3, breakfast: glucerna, lunch: salad or vegetables, fruit, dinner: meat and starch and veg, snacks:   · Exercise: no  · DM related hospitalizations:  August 2021 for hyperglycemia    Complications of DM:  · CAD: no  · CVA: TIA x 2 1-2019, 3-2019  · PVD: no  · Amputations: no   · Retinopathy: no; last exam was 10-  · Gastropathy: no  · Nephropathy: no  · Neuropathy: no    Medications:  · Statin: pravastatin 10 mg   · ACE-I:  Angioedema with lisinopril  · ASA: yes    · Diabetes education: no    Past Medical History:   Diagnosis Date    Abnormal weight gain 9/8/2020    Abnormal weight gain 9/9/2020    Anemia     Anemia     Arthritis     Asthma     Cellulitis and abscess of lower extremity 9/9/2020    Chronic back pain 12/8/2020    Chronic kidney disease     [facility]     Chronic pain     Chronic sinusitis 9/8/2020    Contact dermatitis and eczema due to cause     COPD (chronic obstructive pulmonary disease) ([facility] 9/9/2020    Depression     Depressive disorder 9/9/2020    [facility]     DM (diabetes mellitus), type 2 ([facility] 9/9/2020    DM (diabetes mellitus), type 2, uncontrolled ([facility] 9/8/2020    Dyslipidemia 9/9/2020    Dysphagia 9/8/2020    Dysphagia 9/9/2020    Essential hypertension 9/8/2020    Essential hypertension 9/9/2020    GERD (gastroesophageal reflux disease) 9/8/2020    GERD (gastroesophageal reflux disease)     Headache     Hemorrhoids 9/8/2020    HIV (human immunodeficiency virus infection) ([facility]     Hypercholesterolemia     Hypertension     Hypokalemia 9/8/2020    Insomnia 9/8/2020    Insomnia 9/9/2020    Mononeuritis 9/8/2020    Mononeuritis 9/9/2020    Neck pain 9/9/2020    Noncompliance with treatment 9/8/2020    Noncompliance with treatment 9/9/2020    Pain in throat 9/8/2020    Pain in throat 9/9/2020    Pruritus of skin 9/8/2020    TIA (transient ischemic attack) 9/8/2020    TIA (transient ischemic attack) 9/9/2020    TIA (transient ischemic attack) 9/9/2020    Tick bite 9/8/2020    Tick bite 9/9/2020    Tobacco dependence 9/8/2020    Vitamin D deficiency 9/8/2020    Vitamin D deficiency 9/9/2020     Past Surgical History:   Procedure Laterality Date    HX APPENDECTOMY      HX APPENDECTOMY  2012    HX COLONOSCOPY      HX COLONOSCOPY  06/25/2017    HX GI      HX GYN      HX ORTHOPAEDIC      HX TOTAL LAPAROSCOPIC HYSTERECTOMY  2013    VASCULAR SURGERY PROCEDURE UNLIST        Family History   Problem Relation Age of Onset    Hypertension Mother     Thyroid Disease Mother     Heart Disease Father     Hypertension Father     Diabetes Father     Diabetes Maternal Grandmother      Social History     Tobacco Use    Smoking status: Former Smoker     Packs/day: 1.00     Years: 15.00     Pack years: 15.00     Types: Cigarettes     Quit date:      Years since quittin.8    Smokeless tobacco: Never Used   Substance Use Topics    Alcohol use: Not Currently      Prior to Admission medications    Medication Sig Start Date End Date Taking? Authorizing Provider   atorvastatin (LIPITOR) 40 mg tablet Take 1 Tablet by mouth nightly for 90 days. 10/27/21 1/25/22 Yes Maurisio Nova MD   pioglitazone (ACTOS) 30 mg tablet Take 1 Tablet by mouth daily for 90 days. 10/27/21 1/25/22 Yes Maurisio Nova MD   insulin lispro (HUMALOG) 100 unit/mL kwikpen 10 units before each meal, 90 days 10/27/21  Yes Maurisio Nova MD   Insulin Needles, Disposable, 31 gauge x 5/16\" ndle 4 times a day 10/27/21  Yes Maurisio Nova MD   insulin glargine (LANTUS,BASAGLAR) 100 unit/mL (3 mL) inpn Inject 25 units at bedtime, 90 days 10/27/21  Yes Maurisio Nova MD   fluconazole (DIFLUCAN) 150 mg tablet Take 1 Tablet by mouth daily for 1 day. FDA advises cautious prescribing of oral fluconazole in pregnancy. 10/27/21 10/28/21 Yes Maurisio Nova MD   glucose blood VI test strips (OneTouch Verio test strips) strip Check glucose 3 times a day, 90 days E11.65 10/27/21  Yes Maurisio Nova MD   lancets misc Check glucose 3 times a day, 90 days E11.65 10/27/21  Yes Maurisio Nova MD   fluticasone propionate (FLONASE) 50 mcg/actuation nasal spray 2 SPRAYS BY BOTH NOSTRILS ROUTE DAILY. INDICATIONS: INFLAMMATION OF THE NOSE DUE TO AN ALLERGY 10/10/21  Yes Hernandez Sargent MD   cholecalciferol (Vitamin D3) 25 mcg (1,000 unit) cap Take 1,000 Units by mouth daily.    Yes Provider, Historical   montelukast (SINGULAIR) 10 mg tablet Take 1 Tablet by mouth daily. 9/15/21  Yes Janeth Hankins MD   omeprazole (PRILOSEC) 40 mg capsule TAKE 1 CAP BY MOUTH DAILY. ONE CAP 30 MINUTES BEFORE EATING ONCE A FOOD. 9/13/21  Yes Janeth Hankins MD   metoprolol succinate (TOPROL-XL) 25 mg XL tablet TAKE 1 TABLET BY MOUTH EVERY DAY 9/8/21  Yes Janeth Hankins MD   insulin glargine (LANTUS,BASAGLAR) 100 unit/mL (3 mL) inpn 10 Units by SubCUTAneous route nightly. Start 10 units at bedtime. 90-day supply will be  2700 units for 90-day supply  Patient taking differently: 10 Units by SubCUTAneous route nightly. Takes 12 units at bedtime increased to 17 units at bedtime. units at bedtime. 9/2/21  Yes Janeth Hankins MD   glucose blood VI test strips (ASCENSIA AUTODISC VI, ONE TOUCH ULTRA TEST VI) strip to check blood sugar 3 times a day before each meal and in rotation being on insulin 9/2/21  Yes Janeth Hankins MD   triamterene-hydroCHLOROthiazide (DYAZIDE) 37.5-25 mg per capsule Take 1 Capsule by mouth Every morning. Please call patient to stop losartan HCTZ and start triamterene HCTZ because she has history of throat swelling with lisinopril 8/25/21  Yes Janeth Hankins MD   Spectravite Adult 50 Plus 0.4-300-250 mg-mcg-mcg tab TAKE 1 TABLET BY MOUTH EVERY DAY 5/19/21  Yes Janeth Hankins MD   sertraline (ZOLOFT) 50 mg tablet Take 1 Tab by mouth daily. 5/3/21  Yes Janeth Hankins MD   cetirizine (ZYRTEC) 10 mg tablet Take 1 Tab by mouth daily. 3/31/21  Yes Janeth Hankins MD   Symbicort 160-4.5 mcg/actuation HFAA Take 2 Puffs by inhalation two (2) times a day. 3/31/21  Yes Janeth Hankins MD   zolpidem (AMBIEN) 5 mg tablet Take 1 Tab by mouth nightly as needed for Sleep. Max Daily Amount: 5 mg. 3/31/21  Yes Janeth Hankins MD   albuterol (PROVENTIL HFA, VENTOLIN HFA, PROAIR HFA) 90 mcg/actuation inhaler INHALE 2 PUFFS BY MOUTH EVERY 4-6 HOURS AS FOR WHEEZING 2/5/21  Yes Janeth Hankins MD   acyclovir (ZOVIRAX) 400 mg tablet Take 1 Tab by mouth two (2) times a day. 9/3/20  Yes Provider, Historical   Biktarvy tab tablet Take 1 Tab by mouth daily. 50mg/200 mg/25 mg per day. 9/2/20  Yes Provider, Historical   aspirin delayed-release 81 mg tablet Take 1 Tab by mouth daily. Yes Provider, Historical        Allergies   Allergen Reactions    Latex Unknown (comments)    Lisinopril Swelling     SWELLING OF THE THROAT    Lisinopril Other (comments)     PT.  States causes swelling lf the throat    Bactrim [Sulfamethoprim] Hives    Elavil Hives    Iron Nausea and Vomiting    Lyrica [Pregabalin] Hives    Adhesive Rash    Sulfamethoxazole Unknown (comments)    Trimethoprim Unknown (comments)       Review of Systems:  ROS    A comprehensive review of systems was preformed and it is negative except mentioned in HPI    Objective:     Vitals:    10/27/21 0916 10/27/21 0928   BP: (!) 144/102 (!) 122/96   Pulse: 97 (!) 102   Temp: 96.8 °F (36 °C)    TempSrc: Temporal    SpO2: 99%    Weight: 161 lb (73 kg)    Height: 5' 3\" (1.6 m)         Physical Exam:    Physical Exam  Vitals and nursing note reviewed. Constitutional:       Appearance: Normal appearance. HENT:      Head: Normocephalic and atraumatic. Eyes:      Extraocular Movements: Extraocular movements intact. Conjunctiva/sclera: Conjunctivae normal.   Cardiovascular:      Rate and Rhythm: Normal rate and regular rhythm. Pulmonary:      Effort: Pulmonary effort is normal.      Breath sounds: Normal breath sounds. Abdominal:      General: Bowel sounds are normal.      Palpations: Abdomen is soft. Musculoskeletal:         General: No swelling. Normal range of motion. Cervical back: Neck supple. Skin:     General: Skin is warm. Neurological:      General: No focal deficit present. Mental Status: She is alert and oriented to person, place, and time.    Psychiatric:         Mood and Affect: Mood normal.         Behavior: Behavior normal.       diabetic foot exam:  Bilateral diabetic foot exam was performed today.  Dorsalis pedis pulses 2+ bilaterally. Monofilament sensation normal bilaterally. No ulcers or skin breakdown. Labs and Imaging:    Last 3 Recorded Weights in this Encounter    10/27/21 0916   Weight: 161 lb (73 kg)        Lab Results   Component Value Date/Time    Hemoglobin A1c 9.5 (H) 08/25/2021 11:44 AM    Hemoglobin A1c 6.3 (H) 04/01/2021 01:52 PM    Hemoglobin A1c 13.2 (H) 10/04/2020 09:25 PM    Hemoglobin A1c, External 6.2 03/20/2020 12:00 AM        Assessment:     Patient Active Problem List   Diagnosis Code    Abnormal weight gain R63.5    Cellulitis and abscess of lower extremity L03.119, L02.419    Depressive disorder F32.9    Dysphagia R13.10    COPD (chronic obstructive pulmonary disease) ([facility] J44.9    Essential hypertension I10    Dyslipidemia E78.5    Vitamin D deficiency E55.9    DM (diabetes mellitus), type 2 ([facility] E11.9    TIA (transient ischemic attack) G45.9    Tick bite W57. XXXA    Pain in throat R07.0    Mononeuritis G58.9    Insomnia G47.00    Neck pain M54.2    Noncompliance with treatment Z91.19    Diarrhea R19.7    Thrush B37.0    Type 2 diabetes mellitus with hyperosmolar nonketotic hyperglycemia (HCC) E11.00    Allergic rhinitis, unspecified seasonality, unspecified trigger J30.9    HIV infection, unspecified symptom status ([facility] B20    Abdominal pain R10.9    Acute appendicitis K35.80    Nausea and vomiting R11.2    Tobacco dependence syndrome U74.749    Umbilical hernia W77.9    Administrative encounter Z02.9    Cellulitis L03.90    Chill R68.83    Chronic back pain M54.9, G89.29    Cigarette smoker F17.210    Headache R51.9    Hemorrhoids K64.9    Menopausal flushing N95.1    Recurrent genital herpes simplex A60.00    Swallowing painful R13.10    Anemia D64.9    Cellulitis of lower limb L03.119    Chronic back pain M54.9, G89.29    COPD (chronic obstructive pulmonary disease) (HCC) J44.9    Depressive disorder F32.9    Essential hypertension I10    Dysphagia R13.10    GERD (gastroesophageal reflux disease) K21.9    Hypokalemia E87.6    Hemorrhoids K64.9    Insomnia G47.00    Vitamin D deficiency E55.9    Tick bite W57. Jacky     TIA (transient ischemic attack) G45.9    Mononeuritis G58.9    Tobacco dependence F17.200    Pain in throat R07.0    Noncompliance with treatment Z91.19    Pruritus of skin L29.9    Chronic sinusitis J32.9    Abnormal weight gain R63.5    Noncompliance Z91.19    History of renal insufficiency Z87.448    Uncontrolled type 2 diabetes mellitus with hyperglycemia (HCC) E11.65    Stage 3a chronic kidney disease (HCC) N18.31    History of angioedema Z87.898    Mixed hyperlipidemia E78.2           Plan:     Type 2 diabetes mellitus, uncontrolled:  I reviewed progress note and labs from the referring provider's office. Hemoglobin A1c was 13.2 in October 2020, 6.3% on 4-1-2021, 9.5% on 8-. Fingerstick blood glucose is 326 mg/dL in my office today. Urine negative for ketones. Up to date with diabetes related annual labs: August 2021  Up to date with diabetic eye exam: October 2021  Plan:  Renal function has been fluctuating. Discussed with patient importance of controlling diabetes to prevent endorgan damage. She already has history of TIA. Strong family history of coronary artery disease. Stop Jardiance because of frequent vaginal yeast infection. Start pioglitazone 30 mg daily. Increase Lantus to 25 units daily at bedtime. Change Humalog to 10 units before each meal.  Check blood glucose 3 times a day every day and bring glucometer to next visit in 2 months. I will consider starting her on weekly GLP-1 agonist at next visit. Essential hypertension:  Blood pressure well controlled on current medications. Patient had microalbuminuria when checked in April 2021, no microalbuminuria in August 2021. I will continue to monitor. Mixed hyperlipidemia:  4-1-2021:  Total cholesterol 198, triglycerides 142, .  8-: Total cholesterol 300, triglycerides 190, . Currently on pravastatin 10 mg. Stop pravastatin and start atorvastatin 40 mg daily at bedtime. Discussed about low-fat diet. Tachycardia:  Normal TSH in April and 2021. Patient is supposed to be on metoprolol succinate 25 mg daily. Discussed about compliance. I will consider increasing metoprolol dose at next visit if pulse rate continues to be elevated. History of TIA:  In 2019. On aspirin and statin. Vaginal yeast infection:  Treat with 1 dose of fluconazole. Stop Jardiance. Orders Placed This Encounter    AMB POC GLUCOSE BLOOD, BY GLUCOSE MONITORING DEVICE    AMB POC URINALYSIS DIP STICK AUTO W/O MICRO    atorvastatin (LIPITOR) 40 mg tablet     Sig: Take 1 Tablet by mouth nightly for 90 days. Dispense:  90 Tablet     Refill:  1     DC pravastatin    pioglitazone (ACTOS) 30 mg tablet     Sig: Take 1 Tablet by mouth daily for 90 days. Dispense:  90 Tablet     Refill:  1     DC Jardiance    insulin lispro (HUMALOG) 100 unit/mL kwikpen     Sig: 10 units before each meal, 90 days     Dispense:  9 Adjustable Dose Pre-filled Pen Syringe     Refill:  1    Insulin Needles, Disposable, 31 gauge x 5/16\" ndle     Si times a day     Dispense:  200 Each     Refill:  1    insulin glargine (LANTUS,BASAGLAR) 100 unit/mL (3 mL) inpn     Sig: Inject 25 units at bedtime, 90 days     Dispense:  9 Adjustable Dose Pre-filled Pen Syringe     Refill:  1    fluconazole (DIFLUCAN) 150 mg tablet     Sig: Take 1 Tablet by mouth daily for 1 day. FDA advises cautious prescribing of oral fluconazole in pregnancy.      Dispense:  1 Tablet     Refill:  0    glucose blood VI test strips (OneTouch Verio test strips) strip     Sig: Check glucose 3 times a day, 90 days E11.65     Dispense:  300 Strip     Refill:  2    lancets misc     Sig: Check glucose 3 times a day, 90 days E11.65     Dispense:  300 Each     Refill: 2        Signed By: Bianca Multani MD     October 27, 2021      Return to clinic 2 months

## 2021-10-27 NOTE — PATIENT INSTRUCTIONS
Lantus 25 units at bedtime    Humalog 10 units before each meal    Stop Jardiance and start Pioglitazone 30 mg daily    Stop Pravastatin and start atorvastatin 40 mg at bedtime    Check glucose 3 times a day

## 2021-10-27 NOTE — LETTER
10/27/2021    Patient: Ramya Alba   YOB: 1965   Date of Visit: 10/27/2021     Domenic Kenny MD  [address] [facility],[address], [facility]  [address] [facility] [address]    Dear Domenic Kenny MD,      Thank you for referring Ms. Lulu Walker to [address] [place] [address] for evaluation. My notes for this consultation are attached. If you have questions, please do not hesitate to call me. I look forward to following your patient along with you.       Sincerely,    Twyla Orellana MD

## 2021-12-04 DIAGNOSIS — I10 ESSENTIAL (PRIMARY) HYPERTENSION: ICD-10-CM

## 2021-12-05 RX ORDER — METOPROLOL SUCCINATE 25 MG/1
TABLET, EXTENDED RELEASE ORAL
Qty: 90 TABLET | Refills: 0 | Status: SHIPPED | OUTPATIENT
Start: 2021-12-05 | End: 2022-04-21 | Stop reason: SDUPTHER

## 2021-12-11 PROBLEM — J44.9 COPD (CHRONIC OBSTRUCTIVE PULMONARY DISEASE) (HCC): Status: RESOLVED | Noted: 2020-09-09 | Resolved: 2021-12-11

## 2021-12-11 PROBLEM — J44.9 COPD (CHRONIC OBSTRUCTIVE PULMONARY DISEASE) (HCC): Status: RESOLVED | Noted: 2020-09-08 | Resolved: 2021-12-11

## 2021-12-11 PROBLEM — Z86.73 HISTORY OF TIA (TRANSIENT ISCHEMIC ATTACK): Status: ACTIVE | Noted: 2021-12-11

## 2021-12-22 ENCOUNTER — HOSPITAL ENCOUNTER (EMERGENCY)
Age: 56
Discharge: HOME | End: 2021-12-22
Payer: MEDICARE

## 2021-12-22 ENCOUNTER — APPOINTMENT (OUTPATIENT)
Dept: CT IMAGING | Age: 56
End: 2021-12-22
Attending: NURSE PRACTITIONER
Payer: MEDICARE

## 2021-12-22 VITALS
BODY MASS INDEX: 25.69 KG/M2 | RESPIRATION RATE: 18 BRPM | HEART RATE: 89 BPM | OXYGEN SATURATION: 98 % | SYSTOLIC BLOOD PRESSURE: 107 MMHG | DIASTOLIC BLOOD PRESSURE: 74 MMHG | HEIGHT: 63 IN | WEIGHT: 145 LBS | TEMPERATURE: 98.2 F

## 2021-12-22 DIAGNOSIS — E11.65 UNCONTROLLED TYPE 2 DIABETES MELLITUS WITH HYPERGLYCEMIA (HCC): ICD-10-CM

## 2021-12-22 DIAGNOSIS — K29.00 ACUTE GASTRITIS WITHOUT HEMORRHAGE, UNSPECIFIED GASTRITIS TYPE: Primary | ICD-10-CM

## 2021-12-22 LAB
ALBUMIN SERPL-MCNC: 3.4 G/DL (ref 3.5–5)
ALBUMIN/GLOB SERPL: 0.7 {RATIO} (ref 1.1–2.2)
ALP SERPL-CCNC: 87 U/L (ref 45–117)
ALT SERPL-CCNC: 23 U/L (ref 12–78)
ANION GAP SERPL CALC-SCNC: 7 MMOL/L (ref 5–15)
APPEARANCE UR: CLEAR
AST SERPL W P-5'-P-CCNC: 13 U/L (ref 15–37)
BACTERIA URNS QL MICRO: NEGATIVE /HPF
BASOPHILS # BLD: 0 K/UL (ref 0–0.1)
BASOPHILS NFR BLD: 0 % (ref 0–1)
BILIRUB SERPL-MCNC: 0.7 MG/DL (ref 0.2–1)
BILIRUB UR QL: NEGATIVE
BUN SERPL-MCNC: 8 MG/DL (ref 6–20)
BUN/CREAT SERPL: 6 (ref 12–20)
CA-I BLD-MCNC: 9.5 MG/DL (ref 8.5–10.1)
CHLORIDE SERPL-SCNC: 95 MMOL/L (ref 97–108)
CO2 SERPL-SCNC: 31 MMOL/L (ref 21–32)
COLOR UR: ABNORMAL
CREAT SERPL-MCNC: 1.32 MG/DL (ref 0.55–1.02)
DIFFERENTIAL METHOD BLD: ABNORMAL
EOSINOPHIL # BLD: 0 K/UL (ref 0–0.4)
EOSINOPHIL NFR BLD: 0 % (ref 0–7)
ERYTHROCYTE [DISTWIDTH] IN BLOOD BY AUTOMATED COUNT: 13 % (ref 11.5–14.5)
GLOBULIN SER CALC-MCNC: 4.7 G/DL (ref 2–4)
GLUCOSE BLD STRIP.AUTO-MCNC: 211 MG/DL (ref 65–117)
GLUCOSE BLD STRIP.AUTO-MCNC: 402 MG/DL (ref 65–117)
GLUCOSE BLD STRIP.AUTO-MCNC: 470 MG/DL (ref 65–117)
GLUCOSE SERPL-MCNC: 460 MG/DL (ref 65–100)
GLUCOSE UR STRIP.AUTO-MCNC: >300 MG/DL
HCT VFR BLD AUTO: 40.6 % (ref 35–47)
HGB BLD-MCNC: 13.6 G/DL (ref 11.5–16)
HGB UR QL STRIP: ABNORMAL
IMM GRANULOCYTES # BLD AUTO: 0 K/UL (ref 0–0.04)
IMM GRANULOCYTES NFR BLD AUTO: 0 % (ref 0–0.5)
KETONES UR QL STRIP.AUTO: 20 MG/DL
LEUKOCYTE ESTERASE UR QL STRIP.AUTO: NEGATIVE
LIPASE SERPL-CCNC: 167 U/L (ref 73–393)
LYMPHOCYTES # BLD: 1.7 K/UL (ref 0.8–3.5)
LYMPHOCYTES NFR BLD: 16 % (ref 12–49)
MCH RBC QN AUTO: 29.4 PG (ref 26–34)
MCHC RBC AUTO-ENTMCNC: 33.5 G/DL (ref 30–36.5)
MCV RBC AUTO: 87.7 FL (ref 80–99)
MONOCYTES # BLD: 0.8 K/UL (ref 0–1)
MONOCYTES NFR BLD: 8 % (ref 5–13)
NEUTS SEG # BLD: 8.2 K/UL (ref 1.8–8)
NEUTS SEG NFR BLD: 76 % (ref 32–75)
NITRITE UR QL STRIP.AUTO: NEGATIVE
NRBC # BLD: 0 K/UL (ref 0–0.01)
NRBC BLD-RTO: 0 PER 100 WBC
PERFORMED BY, TECHID: ABNORMAL
PH UR STRIP: 5 [PH] (ref 5–8)
PLATELET # BLD AUTO: 172 K/UL (ref 150–400)
POTASSIUM SERPL-SCNC: 3.5 MMOL/L (ref 3.5–5.1)
PROT SERPL-MCNC: 8.1 G/DL (ref 6.4–8.2)
PROT UR STRIP-MCNC: NEGATIVE MG/DL
RBC # BLD AUTO: 4.63 M/UL (ref 3.8–5.2)
RBC #/AREA URNS HPF: ABNORMAL /HPF (ref 0–5)
SODIUM SERPL-SCNC: 133 MMOL/L (ref 136–145)
SP GR UR REFRACTOMETRY: 1.02 (ref 1–1.03)
UA: UC IF INDICATED,UAUC: ABNORMAL
UROBILINOGEN UR QL STRIP.AUTO: 0.1 EU/DL (ref 0.1–1)
WBC # BLD AUTO: 10.8 K/UL (ref 3.6–11)
WBC URNS QL MICRO: ABNORMAL /HPF (ref 0–4)

## 2021-12-22 PROCEDURE — 85025 COMPLETE CBC W/AUTO DIFF WBC: CPT

## 2021-12-22 PROCEDURE — 83690 ASSAY OF LIPASE: CPT

## 2021-12-22 PROCEDURE — 81001 URINALYSIS AUTO W/SCOPE: CPT

## 2021-12-22 PROCEDURE — 36415 COLL VENOUS BLD VENIPUNCTURE: CPT

## 2021-12-22 PROCEDURE — 82962 GLUCOSE BLOOD TEST: CPT

## 2021-12-22 PROCEDURE — 74011636637 HC RX REV CODE- 636/637: Performed by: NURSE PRACTITIONER

## 2021-12-22 PROCEDURE — 96374 THER/PROPH/DIAG INJ IV PUSH: CPT

## 2021-12-22 PROCEDURE — 96376 TX/PRO/DX INJ SAME DRUG ADON: CPT

## 2021-12-22 PROCEDURE — 74176 CT ABD & PELVIS W/O CONTRAST: CPT

## 2021-12-22 PROCEDURE — 99285 EMERGENCY DEPT VISIT HI MDM: CPT

## 2021-12-22 PROCEDURE — 96375 TX/PRO/DX INJ NEW DRUG ADDON: CPT

## 2021-12-22 PROCEDURE — 74011250636 HC RX REV CODE- 250/636: Performed by: NURSE PRACTITIONER

## 2021-12-22 PROCEDURE — 80053 COMPREHEN METABOLIC PANEL: CPT

## 2021-12-22 RX ORDER — ONDANSETRON 4 MG/1
4 TABLET, FILM COATED ORAL
Qty: 10 TABLET | Refills: 0 | Status: SHIPPED | OUTPATIENT
Start: 2021-12-22 | End: 2022-04-21

## 2021-12-22 RX ORDER — MORPHINE SULFATE 4 MG/ML
4 INJECTION INTRAVENOUS ONCE
Status: COMPLETED | OUTPATIENT
Start: 2021-12-22 | End: 2021-12-22

## 2021-12-22 RX ORDER — FAMOTIDINE 20 MG/1
20 TABLET, FILM COATED ORAL 2 TIMES DAILY
Qty: 20 TABLET | Refills: 0 | Status: SHIPPED | OUTPATIENT
Start: 2021-12-22 | End: 2022-01-01

## 2021-12-22 RX ORDER — ONDANSETRON 2 MG/ML
4 INJECTION INTRAMUSCULAR; INTRAVENOUS
Status: COMPLETED | OUTPATIENT
Start: 2021-12-22 | End: 2021-12-22

## 2021-12-22 RX ORDER — HYDROCODONE BITARTRATE AND ACETAMINOPHEN 5; 325 MG/1; MG/1
1 TABLET ORAL
Qty: 12 TABLET | Refills: 0 | Status: SHIPPED | OUTPATIENT
Start: 2021-12-22 | End: 2021-12-25

## 2021-12-22 RX ADMIN — FAMOTIDINE 20 MG: 10 INJECTION, SOLUTION INTRAVENOUS at 12:56

## 2021-12-22 RX ADMIN — INSULIN HUMAN 10 UNITS: 100 INJECTION, SOLUTION PARENTERAL at 14:46

## 2021-12-22 RX ADMIN — SODIUM CHLORIDE 1000 ML: 9 INJECTION, SOLUTION INTRAVENOUS at 12:57

## 2021-12-22 RX ADMIN — ONDANSETRON 4 MG: 2 INJECTION INTRAMUSCULAR; INTRAVENOUS at 17:20

## 2021-12-22 RX ADMIN — SODIUM CHLORIDE 1000 ML: 9 INJECTION, SOLUTION INTRAVENOUS at 15:20

## 2021-12-22 RX ADMIN — MORPHINE SULFATE 4 MG: 4 INJECTION INTRAVENOUS at 14:01

## 2021-12-22 RX ADMIN — ONDANSETRON 4 MG: 2 INJECTION INTRAMUSCULAR; INTRAVENOUS at 12:56

## 2021-12-22 RX ADMIN — MORPHINE SULFATE 4 MG: 4 INJECTION INTRAVENOUS at 17:20

## 2021-12-22 NOTE — ED PROVIDER NOTES
EMERGENCY DEPARTMENT HISTORY AND PHYSICAL EXAM      Date: 12/22/2021  Patient Name: Natanael Guadarrama    History of Presenting Illness     Chief Complaint   Patient presents with    Abdominal Pain       History Provided By: Patient    HPI: Natanael Guadarrama, 64 y.o. female with a past medical history significant diabetes, hypertension, hyperlipidemia, obesity, renal insufficiency and osteoarthritis presents to the ED with cc of pain. Patient went to [facility] ER yesterday. Patient states she was given a GI cocktail there but is still in pain. Patient came by squad. Per EMS patient's blood sugar was 466. Patient describes the pain as feeling like a brick in her stomach. Per fake patient's friend patient may not be taking meds as prescribed. Moderate severity, no known exacerbating or relieving factors, no other associated signs and symptoms    There are no other complaints, changes, or physical findings at this time. PCP: Maury Leonard MD    No current facility-administered medications on file prior to encounter. Current Outpatient Medications on File Prior to Encounter   Medication Sig Dispense Refill    omeprazole (PRILOSEC) 40 mg capsule TAKE 1 CAP BY MOUTH DAILY. ONE CAP 30 MINUTES BEFORE EATING ONCE A FOOD. 90 Capsule 1    metoprolol succinate (TOPROL-XL) 25 mg XL tablet TAKE 1 TABLET BY MOUTH EVERY DAY 90 Tablet 0    sertraline (ZOLOFT) 50 mg tablet TAKE 1 TABLET BY MOUTH EVERY DAY 90 Tablet 0    atorvastatin (LIPITOR) 40 mg tablet Take 1 Tablet by mouth nightly for 90 days. 90 Tablet 1    pioglitazone (ACTOS) 30 mg tablet Take 1 Tablet by mouth daily for 90 days.  90 Tablet 1    insulin lispro (HUMALOG) 100 unit/mL kwikpen 10 units before each meal, 90 days 9 Adjustable Dose Pre-filled Pen Syringe 1    Insulin Needles, Disposable, 31 gauge x 5/16\" ndle 4 times a day 200 Each 1    insulin glargine (LANTUS,BASAGLAR) 100 unit/mL (3 mL) inpn Inject 25 units at bedtime, 90 days 9 Adjustable Dose Pre-filled Pen Syringe 1    glucose blood VI test strips (OneTouch Verio test strips) strip Check glucose 3 times a day, 90 days E11.65 300 Strip 2    lancets misc Check glucose 3 times a day, 90 days E11.65 300 Each 2    fluticasone propionate (FLONASE) 50 mcg/actuation nasal spray 2 SPRAYS BY BOTH NOSTRILS ROUTE DAILY. INDICATIONS: INFLAMMATION OF THE NOSE DUE TO AN ALLERGY 1 Each 2    cholecalciferol (Vitamin D3) 25 mcg (1,000 unit) cap Take 1,000 Units by mouth daily.  montelukast (SINGULAIR) 10 mg tablet Take 1 Tablet by mouth daily. 90 Tablet 1    insulin glargine (LANTUS,BASAGLAR) 100 unit/mL (3 mL) inpn 10 Units by SubCUTAneous route nightly. Start 10 units at bedtime. 90-day supply will be  2700 units for 90-day supply (Patient taking differently: 10 Units by SubCUTAneous route nightly. Takes 12 units at bedtime increased to 17 units at bedtime. units at bedtime.) 6 Pen 1    glucose blood VI test strips (ASCENSIA AUTODISC VI, ONE TOUCH ULTRA TEST VI) strip to check blood sugar 3 times a day before each meal and in rotation being on insulin 100 Strip 3    triamterene-hydroCHLOROthiazide (DYAZIDE) 37.5-25 mg per capsule Take 1 Capsule by mouth Every morning. Please call patient to stop losartan HCTZ and start triamterene HCTZ because she has history of throat swelling with lisinopril 30 Capsule 3    Spectravite Adult 50 Plus 0.4-300-250 mg-mcg-mcg tab TAKE 1 TABLET BY MOUTH EVERY DAY 90 Tablet 1    cetirizine (ZYRTEC) 10 mg tablet Take 1 Tab by mouth daily. 30 Tab 5    Symbicort 160-4.5 mcg/actuation HFAA Take 2 Puffs by inhalation two (2) times a day. 1 Inhaler 5    zolpidem (AMBIEN) 5 mg tablet Take 1 Tab by mouth nightly as needed for Sleep.  Max Daily Amount: 5 mg. 30 Tab 2    albuterol (PROVENTIL HFA, VENTOLIN HFA, PROAIR HFA) 90 mcg/actuation inhaler INHALE 2 PUFFS BY MOUTH EVERY 4-6 HOURS AS FOR WHEEZING 8.5 Inhaler 0    acyclovir (ZOVIRAX) 400 mg tablet Take 1 Tab by mouth two (2) times a day.  Biktarvy tab tablet Take 1 Tab by mouth daily. 50mg/200 mg/25 mg per day.  aspirin delayed-release 81 mg tablet Take 1 Tab by mouth daily.          Past History     Past Medical History:  Past Medical History:   Diagnosis Date    Abnormal weight gain 9/8/2020    Abnormal weight gain 9/9/2020    Anemia     Anemia     Arthritis     Asthma     Cellulitis and abscess of lower extremity 9/9/2020    Chronic back pain 12/8/2020    Chronic kidney disease     Chronic obstructive pulmonary disease ([address]     Chronic pain     Chronic sinusitis 9/8/2020    Contact dermatitis and eczema due to cause     COPD (chronic obstructive pulmonary disease) [facility] 9/9/2020    Depression     Depressive disorder 9/9/2020    Diabetes ([facility]     DM (diabetes mellitus), type 2 ([address] 9/9/2020    DM (diabetes mellitus), type 2, uncontrolled ([facility] 9/8/2020    Dyslipidemia 9/9/2020    Dysphagia 9/8/2020    Dysphagia 9/9/2020    Essential hypertension 9/8/2020    Essential hypertension 9/9/2020    GERD (gastroesophageal reflux disease) 9/8/2020    GERD (gastroesophageal reflux disease)     Headache     Hemorrhoids 9/8/2020    HIV (human immunodeficiency virus infection) ([facility]     Hypercholesterolemia     Hypertension     Hypokalemia 9/8/2020    Insomnia 9/8/2020    Insomnia 9/9/2020    Mononeuritis 9/8/2020    Mononeuritis 9/9/2020    Neck pain 9/9/2020    Noncompliance with treatment 9/8/2020    Noncompliance with treatment 9/9/2020    Pain in throat 9/8/2020    Pain in throat 9/9/2020    Pruritus of skin 9/8/2020    TIA (transient ischemic attack) 9/8/2020    TIA (transient ischemic attack) 9/9/2020    TIA (transient ischemic attack) 9/9/2020    Tick bite 9/8/2020    Tick bite 9/9/2020    Tobacco dependence 9/8/2020    Vitamin D deficiency 9/8/2020    Vitamin D deficiency 9/9/2020       Past Surgical History:  Past Surgical History:   Procedure Laterality Date    HX APPENDECTOMY      HX APPENDECTOMY  2012    HX COLONOSCOPY      HX COLONOSCOPY  2017    HX GI      HX GYN      HX ORTHOPAEDIC      HX TOTAL LAPAROSCOPIC HYSTERECTOMY  2013    VASCULAR SURGERY PROCEDURE UNLIST         Family History:  Family History   Problem Relation Age of Onset    Hypertension Mother     Thyroid Disease Mother     Heart Disease Father     Hypertension Father     Diabetes Father     Diabetes Maternal Grandmother        Social History:  Social History     Tobacco Use    Smoking status: Former Smoker     Packs/day: 1.00     Years: 15.00     Pack years: 15.00     Types: Cigarettes     Quit date:      Years since quittin.9    Smokeless tobacco: Never Used   Vaping Use    Vaping Use: Never used   Substance Use Topics    Alcohol use: Not Currently    Drug use: Never       Allergies: Allergies   Allergen Reactions    Latex Unknown (comments)    Lisinopril Swelling     SWELLING OF THE THROAT    Lisinopril Other (comments)     PT.  States causes swelling lf the throat    Bactrim [Sulfamethoprim] Hives    Elavil Hives    Iron Nausea and Vomiting    Lyrica [Pregabalin] Hives    Adhesive Rash    Sulfamethoxazole Unknown (comments)    Trimethoprim Unknown (comments)         Review of Systems     Review of Systems   Constitutional: Negative for chills, fatigue and fever. HENT: Negative for congestion, sinus pressure and trouble swallowing. Eyes: Negative for photophobia and pain. Respiratory: Negative for cough and shortness of breath. Cardiovascular: Negative for chest pain and leg swelling. Gastrointestinal: Positive for abdominal pain and diarrhea. Negative for nausea and vomiting. Endocrine: Negative for polydipsia, polyphagia and polyuria. Genitourinary: Negative for decreased urine volume, difficulty urinating, dysuria, hematuria and urgency. Musculoskeletal: Negative for back pain, gait problem, myalgias and neck pain.    Skin: Negative for pallor and rash. Allergic/Immunologic: Negative for environmental allergies and food allergies. Neurological: Negative for dizziness, facial asymmetry, speech difficulty, numbness and headaches. Hematological: Negative for adenopathy. Does not bruise/bleed easily. Psychiatric/Behavioral: Negative for agitation, self-injury and suicidal ideas. The patient is not nervous/anxious. Physical Exam     Physical Exam  Vitals and nursing note reviewed. Constitutional:       Appearance: Normal appearance. HENT:      Head: Atraumatic. Right Ear: Tympanic membrane and external ear normal.      Left Ear: Tympanic membrane and external ear normal.      Nose: Nose normal.      Mouth/Throat:      Mouth: Mucous membranes are moist.   Eyes:      Extraocular Movements: Extraocular movements intact. Pupils: Pupils are equal, round, and reactive to light. Cardiovascular:      Rate and Rhythm: Normal rate and regular rhythm. Pulses: Normal pulses. Heart sounds: Normal heart sounds. Pulmonary:      Breath sounds: Normal breath sounds. Abdominal:      General: Abdomen is flat. Bowel sounds are normal.      Palpations: Abdomen is soft. Tenderness: There is generalized abdominal tenderness and tenderness in the epigastric area. Musculoskeletal:         General: Normal range of motion. Cervical back: Normal range of motion and neck supple. Skin:     General: Skin is warm and dry. Capillary Refill: Capillary refill takes less than 2 seconds. Neurological:      General: No focal deficit present. Mental Status: She is alert and oriented to person, place, and time. Mental status is at baseline.    Psychiatric:         Mood and Affect: Mood normal.         Behavior: Behavior normal.         Lab and Diagnostic Study Results     Labs -     Recent Results (from the past 12 hour(s))   CBC WITH AUTOMATED DIFF    Collection Time: 12/22/21 12:11 PM   Result Value Ref Range    WBC 10.8 3.6 - 11.0 K/uL    RBC 4.63 3.80 - 5.20 M/uL    HGB 13.6 11.5 - 16.0 g/dL    HCT 40.6 35.0 - 47.0 %    MCV 87.7 80.0 - 99.0 FL    MCH 29.4 26.0 - 34.0 PG    MCHC 33.5 30.0 - 36.5 g/dL    RDW 13.0 11.5 - 14.5 %    PLATELET 674 013 - 095 K/uL    NRBC 0.0 0.0  WBC    ABSOLUTE NRBC 0.00 0.00 - 0.01 K/uL    NEUTROPHILS 76 (H) 32 - 75 %    LYMPHOCYTES 16 12 - 49 %    MONOCYTES 8 5 - 13 %    EOSINOPHILS 0 0 - 7 %    BASOPHILS 0 0 - 1 %    IMMATURE GRANULOCYTES 0 0 - 0.5 %    ABS. NEUTROPHILS 8.2 (H) 1.8 - 8.0 K/UL    ABS. LYMPHOCYTES 1.7 0.8 - 3.5 K/UL    ABS. MONOCYTES 0.8 0.0 - 1.0 K/UL    ABS. EOSINOPHILS 0.0 0.0 - 0.4 K/UL    ABS. BASOPHILS 0.0 0.0 - 0.1 K/UL    ABS. IMM. GRANS. 0.0 0.00 - 0.04 K/UL    DF AUTOMATED     LIPASE    Collection Time: 12/22/21 12:11 PM   Result Value Ref Range    Lipase 167 73 - 728 U/L   METABOLIC PANEL, COMPREHENSIVE    Collection Time: 12/22/21 12:11 PM   Result Value Ref Range    Sodium 133 (L) 136 - 145 mmol/L    Potassium 3.5 3.5 - 5.1 mmol/L    Chloride 95 (L) 97 - 108 mmol/L    CO2 31 21 - 32 mmol/L    Anion gap 7 5 - 15 mmol/L    Glucose 460 (H) 65 - 100 mg/dL    BUN 8 6 - 20 mg/dL    Creatinine 1.32 (H) 0.55 - 1.02 mg/dL    BUN/Creatinine ratio 6 (L) 12 - 20      GFR est AA 50 (L) >60 ml/min/1.73m2    GFR est non-AA 42 (L) >60 ml/min/1.73m2    Calcium 9.5 8.5 - 10.1 mg/dL    Bilirubin, total 0.7 0.2 - 1.0 mg/dL    AST (SGOT) 13 (L) 15 - 37 U/L    ALT (SGPT) 23 12 - 78 U/L    Alk.  phosphatase 87 45 - 117 U/L    Protein, total 8.1 6.4 - 8.2 g/dL    Albumin 3.4 (L) 3.5 - 5.0 g/dL    Globulin 4.7 (H) 2.0 - 4.0 g/dL    A-G Ratio 0.7 (L) 1.1 - 2.2     GLUCOSE, POC    Collection Time: 12/22/21 12:55 PM   Result Value Ref Range    Glucose (POC) 470 (H) 65 - 117 mg/dL    Performed by [address], POC    Collection Time: 12/22/21  2:40 PM   Result Value Ref Range    Glucose (POC) 402 (H) 65 - 117 mg/dL    Performed by [address], POC    Collection Time: 12/22/21  4:56 PM   Result Value Ref Range    Glucose (POC) 211 (H) 65 - 117 mg/dL    Performed by Ezio DESHPANDEGEN    URINALYSIS W/ REFLEX CULTURE    Collection Time: 12/22/21  5:25 PM    Specimen: Urine   Result Value Ref Range    Color Yellow/Straw      Appearance Clear Clear      Specific gravity 1.022 1.003 - 1.030      pH (UA) 5.0 5.0 - 8.0      Protein Negative Negative mg/dL    Glucose >300 (A) Negative mg/dL    Ketone 20 (A) Negative mg/dL    Bilirubin Negative Negative      Blood Small (A) Negative      Urobilinogen 0.1 0.1 - 1.0 EU/dL    Nitrites Negative Negative      Leukocyte Esterase Negative Negative      UA:UC IF INDICATED Culture not indicated by UA result Culture not indicated by UA result      WBC 0-4 0 - 4 /hpf    RBC 0-5 0 - 5 /hpf    Bacteria Negative Negative /hpf       Radiologic Studies -   @lastxrresult@  CT Results  (Last 48 hours)               12/22/21 1649  CT ABD PELV WO CONT Final result    Impression:  1. There is a relatively stable low-density lesion within the posterior   superior aspect of the right hepatic lobe most compatible with a hemangioma,   focal nodular hyperplasia, or a hepatic adenoma. 2.  There is increased soft tissue density layering within the dependent portion   of the gallbladder lumen which could reflect biliary sludge or small   noncalcified stones. 3.  There is a normal caliber to the small bowel and colon without bowel   obstruction or active inflammatory changes. There is an area of increased linear   density at the base of the cecum which may represents suture material related to   a previous appendectomy. 4.  The patient status post interval hysterectomy and salpingo-oophorectomy. Narrative: The study is a CT evaluation of the abdomen and pelvis dated 12/22/2021. History: Generalized abdominal pain. Technique: 3 mm axial imaging was acquired through the abdomen and pelvis   without intravenous contrast administration. Sagittal and coronal reconstructed   imaging is also submitted for interpretation. Dose Reduction Technique was employed to reduce radiation exposure - This   includes reduction optimization techniques as appropriate to a performed exam   with automated exposure control adjustments of the mA and/or Kv according to   patient size, or use of iterative reconstruction technique. LIMITATIONS: Absence of oral contrast opacification of the bowel. COMPARISON: CT evaluation of the abdomen dated 5/27/2011. LIVER AND SPLEEN: There is a small low density lesion within the posterior   superior aspect of the right hepatic lobe without significant progression in its   size. These findings may reflect a region of focal nodular hyperplasia. The   remainder of the liver images in a normal fashion. KIDNEYS: The previously described 2 cm cyst of the right kidney is not   demonstrated on the current examination. . The kidneys are unremarkable. PANCREAS AND ADRENAL GLANDS: This examination is negative for focal   abnormalities the pancreas or of the adrenal glands. GALLBLADDER AND BILIARY TREE: There is increased density within the dependent   portion of the gallbladder lumen. These findings could reflect biliary sludge or   noncalcified gallstones. This examination is negative for gallbladder wall   thickening. There is normal caliber to the common bile duct and the pancreatic   duct. LOWER CHEST: There is a geographical groundglass parenchymal density within the   lateral basilar segment of the right lower lobe. There is a similar area   demonstrated within the left lower lobe. These findings are most compatible with   discoid atelectasis. VASCULARITY: This examination is negative for aneurysmal disease. BOWEL: There is normal caliber to the stomach and duodenum. The jejunum and   ileum are normal in caliber. This examination is negative for colitis.  The   patient's appendix is not identified. OTHER: None. CT EVALUATION OF THE PELVIS: The patient status post a previous hysterectomy. The patient's ovaries are not identified. The bladder is unremarkable. The   rectum images in a normal fashion. OSSEOUS STRUCTURES:This examination is negative for acute osseous abnormalities. CXR Results  (Last 48 hours)    None            Medical Decision Making   - I am the first provider for this patient. - I reviewed the vital signs, available nursing notes, past medical history, past surgical history, family history and social history. - Initial assessment performed. The patients presenting problems have been discussed, and they are in agreement with the care plan formulated and outlined with them. I have encouraged them to ask questions as they arise throughout their visit. Vital Signs-Reviewed the patient's vital signs. Patient Vitals for the past 12 hrs:   Temp Pulse Resp BP SpO2   12/22/21 1722 -- 87 20 (!) 141/123 100 %   12/22/21 1403 -- 93 20 (!) 131/106 100 %   12/22/21 1306 -- -- -- -- 100 %   12/22/21 1306 -- 89 18 (!) 135/99 100 %   12/22/21 1140 98.2 °F (36.8 °C) (!) 101 18 (!) 128/94 99 %       Records Reviewed: Nursing Notes and Old Medical Records        ED Course:     ED Course as of 12/22/21 1816   Wed Dec 22, 2021   [address] [facility] Patient turned over to Zuleima Heidy PA [CB]      ED Course User Index  [CB] Teresita Decker NP       Provider Notes (Medical Decision Making):   Pt presents with acute abdominal pain; vital signs stable with currently a non-peritoneal exam; DDx includes: Gastroenteritis, hepatitis, pancreatitis, obstruction, appendicitis, viral illness, IBD, diverticulitis, mesenteric ischemia, AAA or descending dissection, ACS, kidney stone. Will get labs, treat symptomatically and obtain serial abdominal exams to determine if additional imaging is indicated. Will reassess and monitor closely.    MDM       Procedures   Medical Decision Makingedical Decision Making  Performed by: Karina Crowder NP  PROCEDURES:  Procedures       Disposition   Disposition: DC- Adult Discharges: All of the diagnostic tests were reviewed and questions answered. Diagnosis, care plan and treatment options were discussed. The patient understands the instructions and will follow up as directed. The patients results have been reviewed with them. They have been counseled regarding their diagnosis. The patient verbally convey understanding and agreement of the signs, symptoms, diagnosis, treatment and prognosis and additionally agrees to follow up as recommended with their PCP in 24 - 48 hours. They also agree with the care-plan and convey that all of their questions have been answered. I have also put together some discharge instructions for them that include: 1) educational information regarding their diagnosis, 2) how to care for their diagnosis at home, as well a 3) list of reasons why they would want to return to the ED prior to their follow-up appointment, should their condition change. DISCHARGE PLAN:  1. Current Discharge Medication List      CONTINUE these medications which have NOT CHANGED    Details   omeprazole (PRILOSEC) 40 mg capsule TAKE 1 CAP BY MOUTH DAILY. ONE CAP 30 MINUTES BEFORE EATING ONCE A FOOD. Qty: 90 Capsule, Refills: 1      metoprolol succinate (TOPROL-XL) 25 mg XL tablet TAKE 1 TABLET BY MOUTH EVERY DAY  Qty: 90 Tablet, Refills: 0    Associated Diagnoses: Essential (primary) hypertension      sertraline (ZOLOFT) 50 mg tablet TAKE 1 TABLET BY MOUTH EVERY DAY  Qty: 90 Tablet, Refills: 0      atorvastatin (LIPITOR) 40 mg tablet Take 1 Tablet by mouth nightly for 90 days. Qty: 90 Tablet, Refills: 1    Comments: DIONE pravastatin  Associated Diagnoses: Mixed hyperlipidemia      pioglitazone (ACTOS) 30 mg tablet Take 1 Tablet by mouth daily for 90 days.   Qty: 90 Tablet, Refills: 1    Comments: DIONE Jardiance  Associated Diagnoses: Type 2 diabetes mellitus with hyperglycemia, with long-term current use of insulin ([facility])      insulin lispro (HUMALOG) 100 unit/mL kwikpen 10 units before each meal, 90 days  Qty: 9 Adjustable Dose Pre-filled Pen Syringe, Refills: 1    Associated Diagnoses: Type 2 diabetes mellitus with hyperglycemia, with long-term current use of insulin ([facility])      Insulin Needles, Disposable, 31 gauge x 5/16\" ndle 4 times a day  Qty: 200 Each, Refills: 1    Associated Diagnoses: Type 2 diabetes mellitus with hyperglycemia, with long-term current use of insulin ([address]      ! ! insulin glargine (LANTUS,BASAGLAR) 100 unit/mL (3 mL) inpn Inject 25 units at bedtime, 90 days  Qty: 9 Adjustable Dose Pre-filled Pen Syringe, Refills: 1    Associated Diagnoses: Type 2 diabetes mellitus with hyperglycemia, with long-term current use of insulin ([address]      ! ! glucose blood VI test strips (OneTouch Verio test strips) strip Check glucose 3 times a day, 90 days E11.65  Qty: 300 Strip, Refills: 2    Associated Diagnoses: Type 2 diabetes mellitus with hyperglycemia, with long-term current use of insulin ([facility])      lancets misc Check glucose 3 times a day, 90 days E11.65  Qty: 300 Each, Refills: 2    Associated Diagnoses: Type 2 diabetes mellitus with hyperglycemia, with long-term current use of insulin ([facility])      fluticasone propionate (FLONASE) 50 mcg/actuation nasal spray 2 SPRAYS BY BOTH NOSTRILS ROUTE DAILY. INDICATIONS: INFLAMMATION OF THE NOSE DUE TO AN ALLERGY  Qty: 1 Each, Refills: 2      cholecalciferol (Vitamin D3) 25 mcg (1,000 unit) cap Take 1,000 Units by mouth daily. montelukast (SINGULAIR) 10 mg tablet Take 1 Tablet by mouth daily. Qty: 90 Tablet, Refills: 1      !! insulin glargine (LANTUS,BASAGLAR) 100 unit/mL (3 mL) inpn 10 Units by SubCUTAneous route nightly. Start 10 units at bedtime.   90-day supply will be  2700 units for 90-day supply  Qty: 6 Pen, Refills: 1      !! glucose blood VI test strips (ASCENSIA AUTODISC VI, ONE TOUCH ULTRA TEST VI) strip to check blood sugar 3 times a day before each meal and in rotation being on insulin  Qty: 100 Strip, Refills: 3      triamterene-hydroCHLOROthiazide (DYAZIDE) 37.5-25 mg per capsule Take 1 Capsule by mouth Every morning. Please call patient to stop losartan HCTZ and start triamterene HCTZ because she has history of throat swelling with lisinopril  Qty: 30 Capsule, Refills: 3    Comments: Please call patient, she should stop losartan HCTZ because she has history of throat swelling with lisinopril      Spectravite Adult 50 Plus 0.4-300-250 mg-mcg-mcg tab TAKE 1 TABLET BY MOUTH EVERY DAY  Qty: 90 Tablet, Refills: 1    Associated Diagnoses: Human immunodeficiency virus (HIV) disease ([facility])      cetirizine (ZYRTEC) 10 mg tablet Take 1 Tab by mouth daily. Qty: 30 Tab, Refills: 5      Symbicort 160-4.5 mcg/actuation HFAA Take 2 Puffs by inhalation two (2) times a day. Qty: 1 Inhaler, Refills: 5      zolpidem (AMBIEN) 5 mg tablet Take 1 Tab by mouth nightly as needed for Sleep. Max Daily Amount: 5 mg. Qty: 30 Tab, Refills: 2    Associated Diagnoses: Psychophysiological insomnia      albuterol (PROVENTIL HFA, VENTOLIN HFA, PROAIR HFA) 90 mcg/actuation inhaler INHALE 2 PUFFS BY MOUTH EVERY 4-6 HOURS AS FOR WHEEZING  Qty: 8.5 Inhaler, Refills: 0    Associated Diagnoses: Chronic obstructive pulmonary disease, unspecified ([facility])      acyclovir (ZOVIRAX) 400 mg tablet Take 1 Tab by mouth two (2) times a day. Biktarvy tab tablet Take 1 Tab by mouth daily. 50mg/200 mg/25 mg per day. aspirin delayed-release 81 mg tablet Take 1 Tab by mouth daily. !! - Potential duplicate medications found. Please discuss with provider. 2.   Follow-up Information    None       3. Return to ED if worse   4.    Current Discharge Medication List      START taking these medications    Details   ondansetron hcl (ZOFRAN) 4 mg tablet Take 1 Tablet by mouth every eight (8) hours as needed for Nausea or Vomiting. Qty: 10 Tablet, Refills: 0  Start date: 12/22/2021      HYDROcodone-acetaminophen (Lorcet, HYDROcodone,) 5-325 mg per tablet Take 1 Tablet by mouth every six (6) hours as needed for Pain for up to 3 days. Max Daily Amount: 4 Tablets. Qty: 12 Tablet, Refills: 0  Start date: 12/22/2021, End date: 12/25/2021    Associated Diagnoses: Acute gastritis without hemorrhage, unspecified gastritis type      famotidine (Pepcid) 20 mg tablet Take 1 Tablet by mouth two (2) times a day for 10 days. Qty: 20 Tablet, Refills: 0  Start date: 12/22/2021, End date: 1/1/2022               Diagnosis     Clinical Impression:   1. Acute gastritis without hemorrhage, unspecified gastritis type    2. Uncontrolled type 2 diabetes mellitus with hyperglycemia ([facility]        Attestations:    Álvaro Fink NP    Please note that this dictation was completed with Parchment, the computer voice recognition software. Quite often unanticipated grammatical, syntax, homophones, and other interpretive errors are inadvertently transcribed by the computer software. Please disregard these errors. Please excuse any errors that have escaped final proofreading. Thank you.

## 2021-12-22 NOTE — ED NOTES
Pt in bed resting with eyes closed c/o abdominal pain at a rate of 10. Pain medication given, writer will continue to monitor.

## 2021-12-22 NOTE — ED NOTES
Assumed care of pt. Pt resting in bed with eyes open, pt c/o abdominal pain. Pt in no acute distress at this time.  Writer will continue to monitor

## 2021-12-22 NOTE — ED TRIAGE NOTES
Pt arrives with EMS with c/o abdominal pain since last Thursday. States \"feels like a brick in my stomach\" with nausea and diarrhea. Blood glucose 466 per EMS. States she was seen at [facility] ER last night and given GI cocktail and still in pain.

## 2022-01-06 ENCOUNTER — OFFICE VISIT (OUTPATIENT)
Dept: ENDOCRINOLOGY | Age: 57
End: 2022-01-06
Payer: MEDICARE

## 2022-01-06 VITALS
DIASTOLIC BLOOD PRESSURE: 82 MMHG | WEIGHT: 163.8 LBS | OXYGEN SATURATION: 99 % | BODY MASS INDEX: 29.02 KG/M2 | HEIGHT: 63 IN | TEMPERATURE: 97.1 F | SYSTOLIC BLOOD PRESSURE: 117 MMHG | HEART RATE: 68 BPM

## 2022-01-06 DIAGNOSIS — E11.65 TYPE 2 DIABETES MELLITUS WITH HYPERGLYCEMIA, WITH LONG-TERM CURRENT USE OF INSULIN (HCC): ICD-10-CM

## 2022-01-06 DIAGNOSIS — E11.65 UNCONTROLLED TYPE 2 DIABETES MELLITUS WITH HYPERGLYCEMIA (HCC): Primary | ICD-10-CM

## 2022-01-06 DIAGNOSIS — Z79.4 TYPE 2 DIABETES MELLITUS WITH HYPERGLYCEMIA, WITH LONG-TERM CURRENT USE OF INSULIN (HCC): ICD-10-CM

## 2022-01-06 DIAGNOSIS — E78.2 MIXED HYPERLIPIDEMIA: ICD-10-CM

## 2022-01-06 LAB
GLUCOSE POC: 271 MG/DL
HBA1C MFR BLD HPLC: 14 %

## 2022-01-06 PROCEDURE — G8417 CALC BMI ABV UP PARAM F/U: HCPCS | Performed by: INTERNAL MEDICINE

## 2022-01-06 PROCEDURE — G8754 DIAS BP LESS 90: HCPCS | Performed by: INTERNAL MEDICINE

## 2022-01-06 PROCEDURE — 3017F COLORECTAL CA SCREEN DOC REV: CPT | Performed by: INTERNAL MEDICINE

## 2022-01-06 PROCEDURE — 82962 GLUCOSE BLOOD TEST: CPT | Performed by: INTERNAL MEDICINE

## 2022-01-06 PROCEDURE — G8427 DOCREV CUR MEDS BY ELIG CLIN: HCPCS | Performed by: INTERNAL MEDICINE

## 2022-01-06 PROCEDURE — 99214 OFFICE O/P EST MOD 30 MIN: CPT | Performed by: INTERNAL MEDICINE

## 2022-01-06 PROCEDURE — G8752 SYS BP LESS 140: HCPCS | Performed by: INTERNAL MEDICINE

## 2022-01-06 PROCEDURE — 83036 HEMOGLOBIN GLYCOSYLATED A1C: CPT | Performed by: INTERNAL MEDICINE

## 2022-01-06 PROCEDURE — 3046F HEMOGLOBIN A1C LEVEL >9.0%: CPT | Performed by: INTERNAL MEDICINE

## 2022-01-06 PROCEDURE — G9717 DOC PT DX DEP/BP F/U NT REQ: HCPCS | Performed by: INTERNAL MEDICINE

## 2022-01-06 PROCEDURE — 2022F DILAT RTA XM EVC RTNOPTHY: CPT | Performed by: INTERNAL MEDICINE

## 2022-01-06 RX ORDER — BLOOD SUGAR DIAGNOSTIC
STRIP MISCELLANEOUS
Qty: 300 STRIP | Refills: 2 | Status: SHIPPED | OUTPATIENT
Start: 2022-01-06 | End: 2022-01-07 | Stop reason: SDUPTHER

## 2022-01-06 RX ORDER — INSULIN GLARGINE 100 [IU]/ML
INJECTION, SOLUTION SUBCUTANEOUS
Qty: 36 ML | Refills: 1 | Status: SHIPPED | OUTPATIENT
Start: 2022-01-06

## 2022-01-06 RX ORDER — PEN NEEDLE, DIABETIC 30 GX3/16"
NEEDLE, DISPOSABLE MISCELLANEOUS
Qty: 200 EACH | Refills: 1 | Status: SHIPPED | OUTPATIENT
Start: 2022-01-06

## 2022-01-06 RX ORDER — PIOGLITAZONEHYDROCHLORIDE 30 MG/1
30 TABLET ORAL DAILY
Qty: 90 TABLET | Refills: 1 | Status: SHIPPED | OUTPATIENT
Start: 2022-01-06 | End: 2022-03-18 | Stop reason: SDUPTHER

## 2022-01-06 RX ORDER — INSULIN LISPRO 100 [IU]/ML
INJECTION, SOLUTION INTRAVENOUS; SUBCUTANEOUS
Qty: 45 ML | Refills: 2 | Status: SHIPPED | OUTPATIENT
Start: 2022-01-06 | End: 2022-07-28

## 2022-01-06 RX ORDER — ATORVASTATIN CALCIUM 40 MG/1
40 TABLET, FILM COATED ORAL
Qty: 90 TABLET | Refills: 1 | Status: SHIPPED | OUTPATIENT
Start: 2022-01-06 | End: 2022-04-06

## 2022-01-06 RX ORDER — ONDANSETRON 4 MG/1
TABLET, ORALLY DISINTEGRATING ORAL
COMMUNITY
Start: 2021-12-21 | End: 2022-04-21 | Stop reason: SDUPTHER

## 2022-01-06 RX ORDER — LANCETS
EACH MISCELLANEOUS
Qty: 300 EACH | Refills: 2 | Status: SHIPPED | OUTPATIENT
Start: 2022-01-06 | End: 2022-01-10 | Stop reason: SDUPTHER

## 2022-01-06 NOTE — LETTER
1/6/2022    Patient: Denisse Walton   YOB: 1965   Date of Visit: 1/6/2022     Preeti Mills MD  [address] [facility],[address], [facility]  [address] [place]    Dear Preeti Mills MD,      Thank you for referring Ms. Rj Cody to [address] [place] [address] for evaluation. My notes for this consultation are attached. If you have questions, please do not hesitate to call me. I look forward to following your patient along with you.       Sincerely,    Gomez Mendieta MD

## 2022-01-06 NOTE — PROGRESS NOTES
History and Physical    Patient: Marialuisa Harden MRN: [id number]  SSN: [id number]    YOB: 1965  Age: 64 y.o. Sex: female      Subjective:      Marialuisa Harden is a 64 y.o. female with past medical history of HIV, hypertension, hyperlipidemia, COPD is sent to me by primary care physician Dr. Amari Goddard for type 2 diabetes mellitus. Today patient tells me that her glucose has been running high. She lost her glucometer a few days back but prior to that when she was checking her blood glucose 3 times a day it was running in 300s and 400s. She has polyuria, polydipsia and blurry vision. She is reporting compliance with medications. At the last visit Lantus was increased from 17 units to 25 units at night and Humalog was changed from 14 units to 10 units 3 times a day before meals and she was started on pioglitazone 30 mg daily. [place]wood Gaunt was stopped at the last visit because of frequent vaginal yeast infections and this has resolved now. She is describing appropriate way of storing and administering insulin, claims that insulin is not . She eats 3 meals per day. She claims that she does not drink any sugary beverages, does not eat sweets.     Glucometer reading: Did not bring glucometer today    · Diagnosis:   · Current treatment: Lantus 25 units at night, Humalog 10 units 3 times a day, pioglitazone 30 mg daily  · Past treatment: metformin (diarrhea, severe), glipizide (not working), Jardiance (yeast)  · Glucose checks: 5 times a day  · Hyperglycemia: yes  · Hypoglycemia: no  · Meals per day: 3, breakfast: glucerna, lunch: salad or vegetables, fruit, dinner: meat and starch and veg, snacks:   · Exercise: no  · DM related hospitalizations:  2021 for hyperglycemia    Complications of DM:  · CAD: no  · CVA: TIA x 2 , 3-2019  · PVD: no  · Amputations: no   · Retinopathy: no; last exam was 10-  · Gastropathy: no  · Nephropathy: no  · Neuropathy: no    Medications:  · Statin: atorvastatin 40   · ACE-I:  Angioedema with lisinopril  · ASA: yes    · Diabetes education: no    Past Medical History:   Diagnosis Date    Abnormal weight gain 9/8/2020    Abnormal weight gain 9/9/2020    Anemia     Anemia     Arthritis     Asthma     Cellulitis and abscess of lower extremity 9/9/2020    Chronic back pain 12/8/2020    Chronic kidney disease     Chronic obstructive pulmonary disease (HCC)     Chronic pain     Chronic sinusitis 9/8/2020    Contact dermatitis and eczema due to cause     COPD (chronic obstructive pulmonary disease) ([facility] 9/9/2020    Depression     Depressive disorder 9/9/2020    Diabetes ([facility]     DM (diabetes mellitus), type 2 ([facility] 9/9/2020    DM (diabetes mellitus), type 2, uncontrolled ([facility] 9/8/2020    Dyslipidemia 9/9/2020    Dysphagia 9/8/2020    Dysphagia 9/9/2020    Essential hypertension 9/8/2020    Essential hypertension 9/9/2020    GERD (gastroesophageal reflux disease) 9/8/2020    GERD (gastroesophageal reflux disease)     Headache     Hemorrhoids 9/8/2020    HIV (human immunodeficiency virus infection) ([facility]     Hypercholesterolemia     Hypertension     Hypokalemia 9/8/2020    Insomnia 9/8/2020    Insomnia 9/9/2020    Mononeuritis 9/8/2020    Mononeuritis 9/9/2020    Neck pain 9/9/2020    Noncompliance with treatment 9/8/2020    Noncompliance with treatment 9/9/2020    Pain in throat 9/8/2020    Pain in throat 9/9/2020    Pruritus of skin 9/8/2020    TIA (transient ischemic attack) 9/8/2020    TIA (transient ischemic attack) 9/9/2020    TIA (transient ischemic attack) 9/9/2020    Tick bite 9/8/2020    Tick bite 9/9/2020    Tobacco dependence 9/8/2020    Vitamin D deficiency 9/8/2020    Vitamin D deficiency 9/9/2020     Past Surgical History:   Procedure Laterality Date    HX APPENDECTOMY      HX APPENDECTOMY  2012    HX COLONOSCOPY      HX COLONOSCOPY  06/25/2017    HX GI      HX GYN  HX ORTHOPAEDIC      HX TOTAL LAPAROSCOPIC HYSTERECTOMY  2013    VASCULAR SURGERY PROCEDURE UNLIST        Family History   Problem Relation Age of Onset    Hypertension Mother     Thyroid Disease Mother     Heart Disease Father     Hypertension Father     Diabetes Father     Diabetes Maternal Grandmother      Social History     Tobacco Use    Smoking status: Former Smoker     Packs/day: 1.00     Years: 15.00     Pack years: 15.00     Types: Cigarettes     Quit date: 2019     Years since quitting: 3.0    Smokeless tobacco: Never Used   Substance Use Topics    Alcohol use: Not Currently      Prior to Admission medications    Medication Sig Start Date End Date Taking? Authorizing Provider   ondansetron (ZOFRAN ODT) 4 mg disintegrating tablet  12/21/21  Yes Provider, Historical   insulin glargine (LANTUS,BASAGLAR) 100 unit/mL (3 mL) inpn Inject 35 units at bedtime, 90 days 1/6/22  Yes Jose Alvarado MD   Insulin Needles, Disposable, 31 gauge x 5/16\" ndle 4 times a day 1/6/22  Yes Jose Alvarado MD   pioglitazone (ACTOS) 30 mg tablet Take 1 Tablet by mouth daily for 90 days. 1/6/22 4/6/22 Yes Jose Alvarado MD   insulin lispro (HUMALOG) 100 unit/mL kwikpen 15 units before each meal, 90 days 1/6/22  Yes Jose Alvarado MD   atorvastatin (LIPITOR) 40 mg tablet Take 1 Tablet by mouth nightly for 90 days. 1/6/22 4/6/22 Yes Jose Alvarado MD   glucose blood VI test strips (OneTouch Verio test strips) strip Check glucose 3 times a day, 90 days E11.65 1/6/22  Yes Jose Alvarado MD   lancets misc Check glucose 3 times a day, 90 days E11.65 1/6/22  Yes Jose Alvarado MD   ondansetron hcl (ZOFRAN) 4 mg tablet Take 1 Tablet by mouth every eight (8) hours as needed for Nausea or Vomiting. 12/22/21  Yes Gt Ricardo NP   omeprazole (PRILOSEC) 40 mg capsule TAKE 1 CAP BY MOUTH DAILY. ONE CAP 30 MINUTES BEFORE EATING ONCE A FOOD.  12/7/21  Yes Noris Holloway MD   metoprolol succinate (TOPROL-XL) 25 mg XL tablet TAKE 1 TABLET BY MOUTH EVERY DAY 12/5/21  Yes John Meza MD   sertraline (ZOLOFT) 50 mg tablet TAKE 1 TABLET BY MOUTH EVERY DAY 11/16/21 2/14/22 Yes Nati Jama MD   fluticasone propionate (FLONASE) 50 mcg/actuation nasal spray 2 SPRAYS BY BOTH NOSTRILS ROUTE DAILY. INDICATIONS: INFLAMMATION OF THE NOSE DUE TO AN ALLERGY 10/10/21  Yes John Meza MD   cholecalciferol (Vitamin D3) 25 mcg (1,000 unit) cap Take 1,000 Units by mouth daily. Yes Provider, Patricia   montelukast (SINGULAIR) 10 mg tablet Take 1 Tablet by mouth daily. 9/15/21  Yes John Meza MD   insulin glargine (LANTUS,BASAGLAR) 100 unit/mL (3 mL) inpn 10 Units by SubCUTAneous route nightly. Start 10 units at bedtime. 90-day supply will be  2700 units for 90-day supply  Patient taking differently: 10 Units by SubCUTAneous route nightly. Takes 12 units at bedtime increased to 17 units at bedtime. units at bedtime. 9/2/21  Yes John Meza MD   glucose blood VI test strips (ASCENSIA AUTODISC VI, ONE TOUCH ULTRA TEST VI) strip to check blood sugar 3 times a day before each meal and in rotation being on insulin 9/2/21  Yes John Meza MD   triamterene-hydroCHLOROthiazide (DYAZIDE) 37.5-25 mg per capsule Take 1 Capsule by mouth Every morning. Please call patient to stop losartan HCTZ and start triamterene HCTZ because she has history of throat swelling with lisinopril 8/25/21  Yes John Meza MD   Spectravite Adult 50 Plus 0.4-300-250 mg-mcg-mcg tab TAKE 1 TABLET BY MOUTH EVERY DAY 5/19/21  Yes John Meza MD   cetirizine (ZYRTEC) 10 mg tablet Take 1 Tab by mouth daily. 3/31/21  Yes John Meza MD   Symbicort 160-4.5 mcg/actuation HFAA Take 2 Puffs by inhalation two (2) times a day. 3/31/21  Yes John Meza MD   zolpidem (AMBIEN) 5 mg tablet Take 1 Tab by mouth nightly as needed for Sleep.  Max Daily Amount: 5 mg. 3/31/21  Yes John Meza MD   albuterol (PROVENTIL HFA, VENTOLIN HFA, PROAIR HFA) 90 mcg/actuation inhaler INHALE 2 PUFFS BY MOUTH EVERY 4-6 HOURS AS FOR WHEEZING 2/5/21  Yes Mahendra Rivera MD   acyclovir (ZOVIRAX) 400 mg tablet Take 1 Tab by mouth two (2) times a day. 9/3/20  Yes Provider, Historical   Biktarvy tab tablet Take 1 Tab by mouth daily. 50mg/200 mg/25 mg per day. 9/2/20  Yes Provider, Historical   aspirin delayed-release 81 mg tablet Take 1 Tab by mouth daily. Yes Provider, Historical        Allergies   Allergen Reactions    Latex Unknown (comments)    Lisinopril Swelling     SWELLING OF THE THROAT    Lisinopril Other (comments)     PT.  States causes swelling lf the throat    Bactrim [Sulfamethoprim] Hives    Elavil Hives    Iron Nausea and Vomiting    Lyrica [Pregabalin] Hives    Adhesive Rash    Sulfamethoxazole Unknown (comments)    Trimethoprim Unknown (comments)       Review of Systems:  ROS    A comprehensive review of systems was preformed and it is negative except mentioned in HPI    Objective:     Vitals:    01/06/22 1008   BP: 117/82   Pulse: 68   Temp: 97.1 °F (36.2 °C)   TempSrc: Temporal   SpO2: 99%   Weight: 163 lb 12.8 oz (74.3 kg)   Height: 5' 3\" (1.6 m)        Physical Exam:    Physical Exam  Vitals and nursing note reviewed. Constitutional:       Appearance: Normal appearance. HENT:      Head: Normocephalic and atraumatic. Cardiovascular:      Rate and Rhythm: Normal rate and regular rhythm. Pulmonary:      Effort: Pulmonary effort is normal.      Breath sounds: Normal breath sounds. Neurological:      Mental Status: She is alert. 10-  diabetic foot exam:  Bilateral diabetic foot exam was performed today. Dorsalis pedis pulses 2+ bilaterally. Monofilament sensation normal bilaterally. No ulcers or skin breakdown.      Labs and Imaging:    Last 3 Recorded Weights in this Encounter    01/06/22 1008   Weight: 163 lb 12.8 oz (74.3 kg)        Lab Results   Component Value Date/Time    Hemoglobin A1c 9.5 (H) 08/25/2021 11:44 AM    Hemoglobin A1c 6.3 (H) 04/01/2021 01:52 PM    Hemoglobin A1c 13.2 (H) 10/04/2020 09:25 PM    Hemoglobin A1c, External 6.2 03/20/2020 12:00 AM        Assessment:     Patient Active Problem List   Diagnosis Code    Abnormal weight gain R63.5    Cellulitis and abscess of lower extremity L03.119, L02.419    Depressive disorder F32.9    Dysphagia R13.10    Essential hypertension I10    Dyslipidemia E78.5    Vitamin D deficiency E55.9    DM (diabetes mellitus), type 2 ([facility] E11.9    TIA (transient ischemic attack) G45.9    Tick bite W57. XXXA    Pain in throat R07.0    Mononeuritis G58.9    Insomnia G47.00    Neck pain M54.2    Noncompliance with treatment Z91.19    Diarrhea R19.7    Thrush B37.0    Type 2 diabetes mellitus with hyperosmolar nonketotic hyperglycemia (HCC) E11.00    Allergic rhinitis, unspecified seasonality, unspecified trigger J30.9    Chronic obstructive pulmonary disease, unspecified COPD type ([facility] J44.9    HIV infection, unspecified symptom status ([facility] B20    Abdominal pain R10.9    Acute appendicitis K35.80    Nausea and vomiting R11.2    Tobacco dependence syndrome G85.216    Umbilical hernia U95.8    Administrative encounter Z02.9    Cellulitis L03.90    Chill R68.83    Chronic back pain M54.9, G89.29    Cigarette smoker F17.210    Headache R51.9    Hemorrhoids K64.9    Menopausal flushing N95.1    Recurrent genital herpes simplex A60.00    Swallowing painful R13.10    Anemia D64.9    Cellulitis of lower limb L03.119    Chronic back pain M54.9, G89.29    Depressive disorder F32.9    Essential hypertension I10    Dysphagia R13.10    GERD (gastroesophageal reflux disease) K21.9    Hypokalemia E87.6    Hemorrhoids K64.9    Insomnia G47.00    Vitamin D deficiency E55.9    Tick bite W57. Yaneth Courtney    TIA (transient ischemic attack) G45.9    Mononeuritis G58.9    Tobacco dependence F17.200    Pain in throat R07.0    Noncompliance with treatment Z91.19    Pruritus of skin L29.9    Chronic sinusitis J32.9    Abnormal weight gain R63.5    Noncompliance Z91.19    History of renal insufficiency Z87.448    Uncontrolled type 2 diabetes mellitus with hyperglycemia (HCC) E11.65    Stage 3a chronic kidney disease (HCC) N18.31    History of angioedema Z87.898    Mixed hyperlipidemia E78.2    History of TIA (transient ischemic attack) Z86.73           Plan:     Type 2 diabetes mellitus, uncontrolled:  Hemoglobin A1c was 13.2 in October 2020, 6.3% on 4-1-2021, 9.5% on 8-, >14% today. Fingerstick blood glucose is 271 mg/dL in my office today. Up to date with diabetes related annual labs: August 2021  Up to date with diabetic eye exam: October 2021  Plan:  Renal function has been fluctuating. Explained to patient that I do not see how significantly it is possible that her blood glucose would get so high if she has been taking her medications regularly and following diabetic diet. Discussed with patient again importance of controlling diabetes to prevent progression of endorgan damage. She has already had TIA and she has strong family history of coronary artery disease. Give her a new glucometer from my office and sending refill on test strips and lancets. Increase Lantus from 25 units to 35 units at bedtime, increase Humalog from 10 units to 15 units before each meal and continue pioglitazone 30 mg daily. Check blood glucose 3 times a day every day and bring glucometer to next visit in 3 months. Labs prior to next visit. Essential hypertension:  Blood pressure well controlled on current medications. Patient had microalbuminuria when checked in April 2021, no microalbuminuria in August 2021. Mixed hyperlipidemia:  4-1-2021: Total cholesterol 198, triglycerides 142, .  8-: Total cholesterol 300, triglycerides 190, . Currently on pravastatin 10 mg. Switched from pravastatin to atorvastatin 40 mg at the last visit in October 2021.   Patient is reporting compliance. Check lipid profile prior to next visit. Tachycardia:  Normal TSH in April and 2021. Pulse rate is normal today. History of TIA:  In 2019. On aspirin and statin. Orders Placed This Encounter    LIPID PANEL     Standing Status:   Future     Standing Expiration Date:   2022    MICROALBUMIN, UR, RAND W/ MICROALB/CREAT RATIO     Standing Status:   Future     Standing Expiration Date:   2022    TSH RFX ON ABNORMAL TO FREE T4     Standing Status:   Future     Standing Expiration Date:   188    METABOLIC PANEL, COMPREHENSIVE     Standing Status:   Future     Standing Expiration Date:   2022    AMB POC GLUCOSE BLOOD, BY GLUCOSE MONITORING DEVICE    AMB POC HEMOGLOBIN A1C    insulin glargine (LANTUS,BASAGLAR) 100 unit/mL (3 mL) inpn     Sig: Inject 35 units at bedtime, 90 days     Dispense:  36 mL     Refill:  1    Insulin Needles, Disposable, 31 gauge x 5/16\" ndle     Si times a day     Dispense:  200 Each     Refill:  1    pioglitazone (ACTOS) 30 mg tablet     Sig: Take 1 Tablet by mouth daily for 90 days. Dispense:  90 Tablet     Refill:  1     DC Jardiance    insulin lispro (HUMALOG) 100 unit/mL kwikpen     Sig: 15 units before each meal, 90 days     Dispense:  45 mL     Refill:  2    atorvastatin (LIPITOR) 40 mg tablet     Sig: Take 1 Tablet by mouth nightly for 90 days.      Dispense:  90 Tablet     Refill:  1     DC pravastatin    glucose blood VI test strips (OneTouch Verio test strips) strip     Sig: Check glucose 3 times a day, 90 days E11.65     Dispense:  300 Strip     Refill:  2    lancets misc     Sig: Check glucose 3 times a day, 90 days E11.65     Dispense:  300 Each     Refill:  2        Signed By: Gomez Mendieta MD     2022      Return to clinic 3 months

## 2022-01-07 DIAGNOSIS — E11.65 TYPE 2 DIABETES MELLITUS WITH HYPERGLYCEMIA, WITH LONG-TERM CURRENT USE OF INSULIN (HCC): ICD-10-CM

## 2022-01-07 DIAGNOSIS — Z79.4 ENCOUNTER FOR LONG-TERM (CURRENT) USE OF INSULIN (HCC): Primary | ICD-10-CM

## 2022-01-07 DIAGNOSIS — Z79.4 TYPE 2 DIABETES MELLITUS WITH HYPERGLYCEMIA, WITH LONG-TERM CURRENT USE OF INSULIN (HCC): ICD-10-CM

## 2022-01-07 RX ORDER — BLOOD SUGAR DIAGNOSTIC
STRIP MISCELLANEOUS
Qty: 300 STRIP | Refills: 2 | Status: SHIPPED | OUTPATIENT
Start: 2022-01-07 | End: 2022-03-18 | Stop reason: SDUPTHER

## 2022-01-10 DIAGNOSIS — Z79.4 TYPE 2 DIABETES MELLITUS WITH HYPERGLYCEMIA, WITH LONG-TERM CURRENT USE OF INSULIN (HCC): ICD-10-CM

## 2022-01-10 DIAGNOSIS — E11.65 TYPE 2 DIABETES MELLITUS WITH HYPERGLYCEMIA, WITH LONG-TERM CURRENT USE OF INSULIN (HCC): ICD-10-CM

## 2022-01-10 RX ORDER — LANCETS
EACH MISCELLANEOUS
Qty: 300 EACH | Refills: 2 | Status: SHIPPED | OUTPATIENT
Start: 2022-01-10 | End: 2022-03-18 | Stop reason: SDUPTHER

## 2022-01-18 DIAGNOSIS — Z79.4 TYPE 2 DIABETES MELLITUS WITH HYPERGLYCEMIA, WITH LONG-TERM CURRENT USE OF INSULIN (HCC): Primary | ICD-10-CM

## 2022-01-18 DIAGNOSIS — E11.65 TYPE 2 DIABETES MELLITUS WITH HYPERGLYCEMIA, WITH LONG-TERM CURRENT USE OF INSULIN (HCC): Primary | ICD-10-CM

## 2022-01-18 RX ORDER — INSULIN LISPRO 100 [IU]/ML
INJECTION, SOLUTION INTRAVENOUS; SUBCUTANEOUS
Qty: 45 ML | Refills: 2 | Status: SHIPPED | OUTPATIENT
Start: 2022-01-18 | End: 2022-02-10 | Stop reason: SDUPTHER

## 2022-02-10 DIAGNOSIS — Z79.4 TYPE 2 DIABETES MELLITUS WITH HYPERGLYCEMIA, WITH LONG-TERM CURRENT USE OF INSULIN (HCC): ICD-10-CM

## 2022-02-10 DIAGNOSIS — E11.65 TYPE 2 DIABETES MELLITUS WITH HYPERGLYCEMIA, WITH LONG-TERM CURRENT USE OF INSULIN (HCC): ICD-10-CM

## 2022-02-10 RX ORDER — INSULIN LISPRO 100 [IU]/ML
INJECTION, SOLUTION INTRAVENOUS; SUBCUTANEOUS
Qty: 45 ML | Refills: 2 | Status: SHIPPED | OUTPATIENT
Start: 2022-02-10

## 2022-02-12 PROBLEM — E55.9 VITAMIN D DEFICIENCY: Status: RESOLVED | Noted: 2020-09-08 | Resolved: 2022-02-12

## 2022-02-12 PROBLEM — R63.5 ABNORMAL WEIGHT GAIN: Status: RESOLVED | Noted: 2020-09-08 | Resolved: 2022-02-12

## 2022-02-12 PROBLEM — E55.9 VITAMIN D DEFICIENCY: Status: RESOLVED | Noted: 2020-09-09 | Resolved: 2022-02-12

## 2022-02-12 PROBLEM — Z12.31 SCREENING MAMMOGRAM FOR BREAST CANCER: Status: ACTIVE | Noted: 2020-12-08

## 2022-02-12 PROBLEM — Z79.4 LONG TERM (CURRENT) USE OF INSULIN (HCC): Status: ACTIVE | Noted: 2022-02-12

## 2022-02-12 PROBLEM — R63.5 ABNORMAL WEIGHT GAIN: Status: RESOLVED | Noted: 2020-09-09 | Resolved: 2022-02-12

## 2022-02-12 PROBLEM — E11.9 DM (DIABETES MELLITUS), TYPE 2 (HCC): Status: RESOLVED | Noted: 2020-09-09 | Resolved: 2022-02-12

## 2022-03-08 ENCOUNTER — PATIENT OUTREACH (OUTPATIENT)
Dept: CASE MANAGEMENT | Age: 57
End: 2022-03-08

## 2022-03-08 NOTE — PROGRESS NOTES
03/08/22  ACM made several attempts to contact patient, no answer, left  with contact information for a return call.

## 2022-03-11 ENCOUNTER — PATIENT OUTREACH (OUTPATIENT)
Dept: CASE MANAGEMENT | Age: 57
End: 2022-03-11

## 2022-03-11 NOTE — PROGRESS NOTES
Ambulatory Care Management Note    Date/Time:  3/11/2022 11:46 AM    This patient was received as a referral from Care Transition Nurse. Ambulatory Care Manager outreached to patient today to offer care management services. Introduction to self and role of care manager provided. Patient accepted care management services at this time. Follow up call scheduled at this time. Patient has Ambulatory Care Manager's contact number for for any questions or concerns.

## 2022-03-14 ENCOUNTER — PATIENT OUTREACH (OUTPATIENT)
Dept: CASE MANAGEMENT | Age: 57
End: 2022-03-14

## 2022-03-14 PROBLEM — Z12.31 SCREENING MAMMOGRAM FOR BREAST CANCER: Status: RESOLVED | Noted: 2020-12-08 | Resolved: 2022-03-14

## 2022-03-14 NOTE — PROGRESS NOTES
03/14/22    ACM unable to reach patient by telephone, ACM sent [email or web address] message to patient. Will F/U in 7 days.

## 2022-03-18 ENCOUNTER — TELEPHONE (OUTPATIENT)
Dept: ENDOCRINOLOGY | Age: 57
End: 2022-03-18
Payer: MEDICARE

## 2022-03-18 ENCOUNTER — TELEPHONE (OUTPATIENT)
Dept: INTERNAL MEDICINE CLINIC | Age: 57
End: 2022-03-18

## 2022-03-18 ENCOUNTER — PATIENT OUTREACH (OUTPATIENT)
Dept: CASE MANAGEMENT | Age: 57
End: 2022-03-18

## 2022-03-18 DIAGNOSIS — Z79.4 TYPE 2 DIABETES MELLITUS WITH HYPERGLYCEMIA, WITH LONG-TERM CURRENT USE OF INSULIN (HCC): ICD-10-CM

## 2022-03-18 DIAGNOSIS — Z79.4 ENCOUNTER FOR LONG-TERM (CURRENT) USE OF INSULIN (HCC): ICD-10-CM

## 2022-03-18 DIAGNOSIS — Z12.31 ENCOUNTER FOR SCREENING MAMMOGRAM FOR MALIGNANT NEOPLASM OF BREAST: Primary | ICD-10-CM

## 2022-03-18 DIAGNOSIS — I10 ESSENTIAL (PRIMARY) HYPERTENSION: ICD-10-CM

## 2022-03-18 DIAGNOSIS — E11.65 TYPE 2 DIABETES MELLITUS WITH HYPERGLYCEMIA, WITH LONG-TERM CURRENT USE OF INSULIN (HCC): ICD-10-CM

## 2022-03-18 PROBLEM — B20 HIV INFECTION, UNSPECIFIED SYMPTOM STATUS (HCC): Status: ACTIVE | Noted: 2020-10-09

## 2022-03-18 PROBLEM — Z91.199 NONCOMPLIANCE: Status: ACTIVE | Noted: 2021-08-25

## 2022-03-18 PROBLEM — L03.119 CELLULITIS AND ABSCESS OF LOWER EXTREMITY: Status: ACTIVE | Noted: 2020-09-09

## 2022-03-18 PROBLEM — N18.31 STAGE 3A CHRONIC KIDNEY DISEASE (HCC): Status: ACTIVE | Noted: 2021-09-15

## 2022-03-18 PROBLEM — K35.80 ACUTE APPENDICITIS: Status: ACTIVE | Noted: 2020-12-08

## 2022-03-18 PROBLEM — L02.419 CELLULITIS AND ABSCESS OF LOWER EXTREMITY: Status: ACTIVE | Noted: 2020-09-09

## 2022-03-18 PROBLEM — R68.83 CHILL: Status: ACTIVE | Noted: 2020-12-08

## 2022-03-18 PROBLEM — R11.2 NAUSEA AND VOMITING: Status: ACTIVE | Noted: 2020-12-08

## 2022-03-18 PROBLEM — A60.00 RECURRENT GENITAL HERPES SIMPLEX: Status: ACTIVE | Noted: 2020-12-08

## 2022-03-18 PROBLEM — L03.90 CELLULITIS: Status: ACTIVE | Noted: 2020-12-08

## 2022-03-18 PROBLEM — R13.10 DYSPHAGIA: Status: ACTIVE | Noted: 2020-09-09

## 2022-03-18 PROBLEM — E78.5 DYSLIPIDEMIA: Status: ACTIVE | Noted: 2020-09-09

## 2022-03-18 PROBLEM — E78.2 MIXED HYPERLIPIDEMIA: Status: ACTIVE | Noted: 2021-10-27

## 2022-03-18 PROBLEM — Z21 HIV INFECTION, UNSPECIFIED SYMPTOM STATUS (HCC): Status: ACTIVE | Noted: 2020-10-09

## 2022-03-18 PROBLEM — K21.9 GERD (GASTROESOPHAGEAL REFLUX DISEASE): Status: ACTIVE | Noted: 2020-09-08

## 2022-03-18 PROBLEM — L29.9 PRURITUS OF SKIN: Status: ACTIVE | Noted: 2020-09-08

## 2022-03-18 PROBLEM — F32.A DEPRESSIVE DISORDER: Status: ACTIVE | Noted: 2020-09-09

## 2022-03-18 PROCEDURE — 3046F HEMOGLOBIN A1C LEVEL >9.0%: CPT | Performed by: INTERNAL MEDICINE

## 2022-03-18 RX ORDER — LANCETS
EACH MISCELLANEOUS
Qty: 300 EACH | Refills: 2 | Status: SHIPPED | OUTPATIENT
Start: 2022-03-18

## 2022-03-18 RX ORDER — PIOGLITAZONEHYDROCHLORIDE 30 MG/1
30 TABLET ORAL DAILY
Qty: 90 TABLET | Refills: 1 | Status: SHIPPED | OUTPATIENT
Start: 2022-03-18 | End: 2022-06-16

## 2022-03-18 RX ORDER — TRIAMTERENE AND HYDROCHLOROTHIAZIDE 37.5; 25 MG/1; MG/1
1 CAPSULE ORAL DAILY
Qty: 90 CAPSULE | Refills: 0 | OUTPATIENT
Start: 2022-03-18

## 2022-03-18 RX ORDER — METOPROLOL SUCCINATE 25 MG/1
25 TABLET, EXTENDED RELEASE ORAL DAILY
Qty: 90 TABLET | Refills: 0 | OUTPATIENT
Start: 2022-03-18

## 2022-03-18 RX ORDER — FLUTICASONE PROPIONATE 50 MCG
2 SPRAY, SUSPENSION (ML) NASAL DAILY
Qty: 1 EACH | Refills: 2 | OUTPATIENT
Start: 2022-03-18

## 2022-03-18 RX ORDER — BLOOD SUGAR DIAGNOSTIC
STRIP MISCELLANEOUS
Qty: 300 STRIP | Refills: 2 | Status: SHIPPED | OUTPATIENT
Start: 2022-03-18

## 2022-03-18 NOTE — TELEPHONE ENCOUNTER
Case Management called and stated that the patient needs to have an appointment because she is overdue for a f/u. She stated that she has missed appointments with the PCP and has run out of medications for her diabetes and her HTN. I did pend the refills for HTN to you. I also made her an appointment to see Dr. Enma Zhang on Monday at 1:30 however when I let the patient know this she stated that she was not able to make that appointment because she has to work. I did provide the patient with the number to Dr. Keri Smith office to reschedule that appointment. Order was put in for her to have a mammogram on Wed March 23,2022 at 9:15 at the [facility] imaging center. The patient was made aware of the date and time. I also called 's office and let them know that the patient is requesting refills and she does have a f/u with Dr. Rosamaria Myers in April.

## 2022-03-18 NOTE — PROGRESS NOTES
Ambulatory Care Management Note    Date/Time:  3/18/2022 11:52 AM    This Ambulatory Care Manager (ACM) reviewed and updated the following screenings during this call; general assessment, disease specific assessment , self management assessment, SDOH assessments and medication reconciliation     Patient's challenges to self-management identified:   depression, financial, functional cognitive ability, functional physical ability, ineffective coping, lack of knowledge about disease, level of motivation, medical condition, medication management, multi health system providers, PCP relationship, polypharmacy and support system      Medication Management:  poor adherence and poor understanding    Advance Care Planning:   Does patient have an Advance Directive:  Defferd to next call. [employer], Referrals, and Durable Medical Equipment: None at this time. Health Maintenance Due   Topic Date Due    Hepatitis C Screening  Never done    Pneumococcal 0-64 years (1 of 4 - PCV13) Never done    Foot Exam Q1  Never done    DTaP/Tdap/Td series (1 - Tdap) Never done    Shingrix Vaccine Age 50> (1 of 2) Never done    Breast Cancer Screen Mammogram  Never done    Medicare Yearly Exam  Never done    COVID-19 Vaccine (3 - Pfizer risk 4-dose series) 05/26/2021    Flu Vaccine (1) 09/01/2021    Depression Monitoring  09/09/2021     Health Maintenance Reviewed: Mammogram education ACM spoke with Kenyon Pennington RN at PCP office. Patient scheduled for 3/23/2022 9:40 AM. Patient was out of HBP meds, glucose monitoring srtips and lancets, and several other medications. ACM contacted PCP office for refills and schedule patient ASAP for a follow up appointment. Pt has been \"no show\" to previous appointments and [facility] would not refill scripts, medications refilled by provider. PCP appointment scheduled with MARY KAY Becerra next available is 4/21/22. ACM will continue to F/U with patient and remind her to attend appointments.  ACM asked Eliza Lamb at PCP office to contact Endocrinologist office for refills of insulin and DM test supplies, nurse agreed to call. Call was placed and communicated with Patient. Patient was asked to consider health care goals that they would like to focus on with this ACM. ACM will follow up with patient to discuss goals and establish care plan in the next 7-14 days. Urgent goals regarding lack of glucose supplies and refills needed were address during this call. Goals Addressed                    This Visit's Progress      Access [employer] (pt-stated)         03/18/22     Patient will gain access and recover her [employer] access information.  Patient will schedule a mammogram with her PCP. Mega Hillman RN BSN - [facility] - ph. [phone]         Patient verbalizes understanding of self -management goals of living with Diabetes. 03/18/22       Patient will log BG check 3 x day, as ordered by physician.  Patient will log insulin taken after BG readings.    Patient will  diabetic monitoring supplies from [facility].         [address] [place] [address] - ph. [phone] S              PCP/Specialist follow up:   Future Appointments   Date Time Provider Renita Flores   3/23/2022  9:40 AM SRM [address]   4/7/2022 10:30 AM Vicente Vaughn MD [facility] BS AMB   4/21/2022 11:00 AM Stefan Gonzalez NP S[facility] BS AMB

## 2022-03-18 NOTE — TELEPHONE ENCOUNTER
Patient called in stated that she needs a refill on her Pioglitazone, and the pharmacy told her that they never got the prescriptions for her test strips and lancets.  She needs those sent to the pharmacy as well

## 2022-03-19 PROBLEM — R13.10 SWALLOWING PAINFUL: Status: ACTIVE | Noted: 2020-12-08

## 2022-03-19 PROBLEM — Z86.73 HISTORY OF TIA (TRANSIENT ISCHEMIC ATTACK): Status: ACTIVE | Noted: 2021-12-11

## 2022-03-19 PROBLEM — G47.00 INSOMNIA: Status: ACTIVE | Noted: 2020-09-08

## 2022-03-19 PROBLEM — Z91.199 NONCOMPLIANCE WITH TREATMENT: Status: ACTIVE | Noted: 2020-09-08

## 2022-03-19 PROBLEM — R07.0 PAIN IN THROAT: Status: ACTIVE | Noted: 2020-09-09

## 2022-03-19 PROBLEM — K64.9 HEMORRHOIDS: Status: ACTIVE | Noted: 2020-09-08

## 2022-03-19 PROBLEM — E11.65 UNCONTROLLED TYPE 2 DIABETES MELLITUS WITH HYPERGLYCEMIA (HCC): Status: ACTIVE | Noted: 2021-09-15

## 2022-03-19 PROBLEM — Z87.448 HISTORY OF RENAL INSUFFICIENCY: Status: ACTIVE | Noted: 2021-08-25

## 2022-03-19 PROBLEM — E87.6 HYPOKALEMIA: Status: ACTIVE | Noted: 2020-09-08

## 2022-03-19 PROBLEM — J32.9 CHRONIC SINUSITIS: Status: ACTIVE | Noted: 2020-09-08

## 2022-03-19 PROBLEM — M54.9 CHRONIC BACK PAIN: Status: ACTIVE | Noted: 2020-12-08

## 2022-03-19 PROBLEM — E11.00 TYPE 2 DIABETES MELLITUS WITH HYPEROSMOLAR NONKETOTIC HYPERGLYCEMIA (HCC): Status: ACTIVE | Noted: 2020-10-04

## 2022-03-19 PROBLEM — G45.9 TIA (TRANSIENT ISCHEMIC ATTACK): Status: ACTIVE | Noted: 2020-09-08
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
## 2022-03-19 PROBLEM — G89.29 CHRONIC BACK PAIN: Status: ACTIVE | Noted: 2020-12-08

## 2022-03-19 PROBLEM — G58.9 MONONEURITIS: Status: ACTIVE | Noted: 2020-09-08

## 2022-03-19 PROBLEM — G89.29 CHRONIC BACK PAIN: Status: ACTIVE | Noted: 2020-09-08

## 2022-03-19 PROBLEM — J30.9 ALLERGIC RHINITIS, UNSPECIFIED SEASONALITY, UNSPECIFIED TRIGGER: Status: ACTIVE | Noted: 2020-10-09

## 2022-03-19 PROBLEM — G47.00 INSOMNIA: Status: ACTIVE | Noted: 2020-09-09

## 2022-03-19 PROBLEM — L03.119 CELLULITIS OF LOWER LIMB: Status: ACTIVE | Noted: 2020-09-08

## 2022-03-19 PROBLEM — K64.9 HEMORRHOIDS: Status: ACTIVE | Noted: 2020-12-08

## 2022-03-19 PROBLEM — F17.200 TOBACCO DEPENDENCE: Status: ACTIVE | Noted: 2020-09-08

## 2022-03-19 PROBLEM — R07.0 PAIN IN THROAT: Status: ACTIVE | Noted: 2020-09-08
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
## 2022-03-19 PROBLEM — G45.9 TIA (TRANSIENT ISCHEMIC ATTACK): Status: ACTIVE | Noted: 2020-09-09

## 2022-03-19 PROBLEM — D64.9 ANEMIA: Status: ACTIVE | Noted: 2020-09-08

## 2022-03-19 PROBLEM — Z91.199 NONCOMPLIANCE WITH TREATMENT: Status: ACTIVE | Noted: 2020-09-09

## 2022-03-19 PROBLEM — I10 ESSENTIAL HYPERTENSION: Status: ACTIVE | Noted: 2020-09-08

## 2022-03-19 PROBLEM — B37.0 THRUSH: Status: ACTIVE | Noted: 2020-09-10

## 2022-03-19 PROBLEM — M54.9 CHRONIC BACK PAIN: Status: ACTIVE | Noted: 2020-09-08

## 2022-03-19 PROBLEM — K42.9 UMBILICAL HERNIA: Status: ACTIVE | Noted: 2020-12-08

## 2022-03-19 PROBLEM — R13.10 DYSPHAGIA: Status: ACTIVE | Noted: 2020-09-08

## 2022-03-19 PROBLEM — Z87.898 HISTORY OF ANGIOEDEMA: Status: ACTIVE | Noted: 2021-09-15

## 2022-03-20 PROBLEM — J44.9 CHRONIC OBSTRUCTIVE PULMONARY DISEASE, UNSPECIFIED COPD TYPE (HCC): Status: ACTIVE | Noted: 2020-10-09

## 2022-03-20 PROBLEM — M54.2 NECK PAIN: Status: ACTIVE | Noted: 2020-09-09

## 2022-03-20 PROBLEM — R19.7 DIARRHEA: Status: ACTIVE | Noted: 2020-09-10

## 2022-03-20 PROBLEM — F17.200 TOBACCO DEPENDENCE SYNDROME: Status: ACTIVE | Noted: 2020-12-08

## 2022-03-20 PROBLEM — N95.1 MENOPAUSAL FLUSHING: Status: ACTIVE | Noted: 2020-12-08

## 2022-03-20 PROBLEM — F32.A DEPRESSIVE DISORDER: Status: ACTIVE | Noted: 2020-09-08

## 2022-03-20 PROBLEM — R10.9 ABDOMINAL PAIN: Status: ACTIVE | Noted: 2020-12-08

## 2022-03-20 PROBLEM — G58.9 MONONEURITIS: Status: ACTIVE | Noted: 2020-09-09

## 2022-03-20 PROBLEM — R51.9 HEADACHE: Status: ACTIVE | Noted: 2020-12-08

## 2022-03-20 PROBLEM — F17.210 CIGARETTE SMOKER: Status: ACTIVE | Noted: 2020-12-08

## 2022-03-23 ENCOUNTER — HOSPITAL ENCOUNTER (OUTPATIENT)
Dept: MAMMOGRAPHY | Age: 57
Discharge: HOME | End: 2022-03-23
Attending: INTERNAL MEDICINE
Payer: MEDICARE

## 2022-03-23 DIAGNOSIS — Z12.31 ENCOUNTER FOR SCREENING MAMMOGRAM FOR MALIGNANT NEOPLASM OF BREAST: ICD-10-CM

## 2022-03-23 PROCEDURE — 77063 BREAST TOMOSYNTHESIS BI: CPT

## 2022-03-27 NOTE — PROGRESS NOTES
I am not sure what to say but I have tried several times with, extreme Polido needs and she keeps doing not coming me and keeps getting refills now I think that I should discharge from me and she can find another PCP after a great dilemma I have decided this decision I do understand she is working but that does not mean that she should not see the PCP.   She has multiple comorbidities which requires follow-up with every 3 months with PCP and her mammogram is normal of course

## 2022-03-28 ENCOUNTER — PATIENT OUTREACH (OUTPATIENT)
Dept: CASE MANAGEMENT | Age: 57
End: 2022-03-28

## 2022-03-28 NOTE — PROGRESS NOTES
03/28/22     ACM made attempt to reach patient for follow up regarding her medication refills, including BG monitor supplies, BG log and BP log. Patient canceled recent appointments and PCP is no longer willing to refill scripts without seeing the patient first (see PCP notes). Patient did not answer the call, ACM left a VM with patient with contact information for a return call.        Jorge Luis Escobar RN BSN - Ambulatory Care Manager - ph. [phone]

## 2022-04-06 DIAGNOSIS — E11.65 TYPE 2 DIABETES MELLITUS WITH HYPERGLYCEMIA, WITH LONG-TERM CURRENT USE OF INSULIN (HCC): ICD-10-CM

## 2022-04-06 DIAGNOSIS — Z79.4 TYPE 2 DIABETES MELLITUS WITH HYPERGLYCEMIA, WITH LONG-TERM CURRENT USE OF INSULIN (HCC): ICD-10-CM

## 2022-04-21 ENCOUNTER — OFFICE VISIT (OUTPATIENT)
Dept: PRIMARY CARE CLINIC | Age: 57
End: 2022-04-21
Payer: MEDICARE

## 2022-04-21 VITALS
OXYGEN SATURATION: 98 % | RESPIRATION RATE: 16 BRPM | DIASTOLIC BLOOD PRESSURE: 86 MMHG | HEART RATE: 85 BPM | WEIGHT: 191.4 LBS | HEIGHT: 63 IN | SYSTOLIC BLOOD PRESSURE: 131 MMHG | TEMPERATURE: 98.3 F | BODY MASS INDEX: 33.91 KG/M2

## 2022-04-21 DIAGNOSIS — I10 ESSENTIAL (PRIMARY) HYPERTENSION: ICD-10-CM

## 2022-04-21 DIAGNOSIS — G47.30 SLEEP APNEA, UNSPECIFIED TYPE: ICD-10-CM

## 2022-04-21 DIAGNOSIS — Z88.9 HX OF SEASONAL ALLERGIES: ICD-10-CM

## 2022-04-21 DIAGNOSIS — B20 HUMAN IMMUNODEFICIENCY VIRUS (HIV) DISEASE (HCC): ICD-10-CM

## 2022-04-21 DIAGNOSIS — J44.9 CHRONIC OBSTRUCTIVE PULMONARY DISEASE, UNSPECIFIED COPD TYPE (HCC): ICD-10-CM

## 2022-04-21 DIAGNOSIS — R53.82 CHRONIC FATIGUE: Primary | ICD-10-CM

## 2022-04-21 DIAGNOSIS — E55.9 VITAMIN D DEFICIENCY: ICD-10-CM

## 2022-04-21 DIAGNOSIS — R11.0 NAUSEA: ICD-10-CM

## 2022-04-21 PROCEDURE — G8752 SYS BP LESS 140: HCPCS

## 2022-04-21 PROCEDURE — 99215 OFFICE O/P EST HI 40 MIN: CPT

## 2022-04-21 PROCEDURE — G8427 DOCREV CUR MEDS BY ELIG CLIN: HCPCS

## 2022-04-21 PROCEDURE — 3017F COLORECTAL CA SCREEN DOC REV: CPT

## 2022-04-21 PROCEDURE — G8754 DIAS BP LESS 90: HCPCS

## 2022-04-21 PROCEDURE — G9899 SCRN MAM PERF RSLTS DOC: HCPCS

## 2022-04-21 PROCEDURE — G9717 DOC PT DX DEP/BP F/U NT REQ: HCPCS

## 2022-04-21 PROCEDURE — G8417 CALC BMI ABV UP PARAM F/U: HCPCS

## 2022-04-21 RX ORDER — ALBUTEROL SULFATE 90 UG/1
AEROSOL, METERED RESPIRATORY (INHALATION)
Qty: 1 EACH | Refills: 3 | Status: SHIPPED | OUTPATIENT
Start: 2022-04-21 | End: 2022-07-28 | Stop reason: SDUPTHER

## 2022-04-21 RX ORDER — MULTIVIT-MIN/FA/LYCOPEN/LUTEIN .4-300-25
1 TABLET ORAL DAILY
Qty: 90 TABLET | Refills: 1 | Status: SHIPPED | OUTPATIENT
Start: 2022-04-21

## 2022-04-21 RX ORDER — FLUTICASONE PROPIONATE 50 MCG
2 SPRAY, SUSPENSION (ML) NASAL DAILY
Qty: 1 EACH | Refills: 2 | Status: SHIPPED | OUTPATIENT
Start: 2022-04-21 | End: 2022-10-18

## 2022-04-21 RX ORDER — ONDANSETRON 4 MG/1
4 TABLET, ORALLY DISINTEGRATING ORAL
Qty: 30 TABLET | Refills: 1 | OUTPATIENT
Start: 2022-04-21 | End: 2022-07-27

## 2022-04-21 RX ORDER — METOPROLOL SUCCINATE 25 MG/1
25 TABLET, EXTENDED RELEASE ORAL DAILY
Qty: 90 TABLET | Refills: 0 | Status: SHIPPED | OUTPATIENT
Start: 2022-04-21 | End: 2022-06-14 | Stop reason: SDUPTHER

## 2022-04-21 RX ORDER — GLUCOSAMINE SULFATE 1500 MG
1000 POWDER IN PACKET (EA) ORAL DAILY
Qty: 90 CAPSULE | Refills: 1 | Status: SHIPPED | OUTPATIENT
Start: 2022-04-21

## 2022-04-21 NOTE — PROGRESS NOTES
HPI     Chief Complaint   Patient presents with    Medication Refill        HPI:  Jesusita Castillo is a 62 y.o. female who presents to the office today for medication refill. During today's visit the patient would like to discuss disturbed sleep pattern. Sleep pattern disturbance: Daytime effects include fatigue, sleep schedule less than 6 hours of sleep per night, endorses snoring, difficulty falling asleep. Allergies   Allergen Reactions    Latex Unknown (comments)    Lisinopril Swelling     SWELLING OF THE THROAT    Lisinopril Other (comments)     PT.  States causes swelling lf the throat    Bactrim [Sulfamethoprim] Hives    Elavil Hives    Iron Nausea and Vomiting    Lyrica [Pregabalin] Hives    Adhesive Rash    Sulfamethoxazole Unknown (comments)    Trimethoprim Unknown (comments)       Current Outpatient Medications   Medication Sig    albuterol (PROVENTIL HFA, VENTOLIN HFA, PROAIR HFA) 90 mcg/actuation inhaler INHALE 2 PUFFS BY MOUTH EVERY 4-6 HOURS AS FOR WHEEZING    ondansetron (ZOFRAN ODT) 4 mg disintegrating tablet Take 1 Tablet by mouth every eight (8) hours as needed for Nausea or Vomiting.  fluticasone propionate (FLONASE) 50 mcg/actuation nasal spray 2 Sprays by Both Nostrils route daily. Indications: inflammation of the nose due to an allergy    metoprolol succinate (TOPROL-XL) 25 mg XL tablet Take 1 Tablet by mouth daily.  cholecalciferol (Vitamin D3) 25 mcg (1,000 unit) cap Take 1 Capsule by mouth daily.  multivit-min-FA-lycopen-lutein (Spectravite Adult 50 Plus) 0.4-300-250 mg-mcg-mcg tab Take 1 Tablet by mouth daily.  lancets misc Check glucose 3 times a day, 90 days E11.65, Z 79.4    pioglitazone (ACTOS) 30 mg tablet Take 1 Tablet by mouth daily for 90 days.  triamterene-hydroCHLOROthiazide (DYAZIDE) 37.5-25 mg per capsule TAKE 1 CAPSULE BY MOUTH EVERY MORNING. STOP LOSARTAN HCTZ.     insulin glargine (LANTUS,BASAGLAR) 100 unit/mL (3 mL) inpn Inject 35 units at bedtime, 90 days    Insulin Needles, Disposable, 31 gauge x 5/16\" ndle 4 times a day    insulin lispro (HUMALOG) 100 unit/mL kwikpen 15 units before each meal, 90 days    omeprazole (PRILOSEC) 40 mg capsule TAKE 1 CAP BY MOUTH DAILY. ONE CAP 30 MINUTES BEFORE EATING ONCE A FOOD.  montelukast (SINGULAIR) 10 mg tablet Take 1 Tablet by mouth daily.  cetirizine (ZYRTEC) 10 mg tablet Take 1 Tab by mouth daily.  Symbicort 160-4.5 mcg/actuation HFAA Take 2 Puffs by inhalation two (2) times a day.  zolpidem (AMBIEN) 5 mg tablet Take 1 Tab by mouth nightly as needed for Sleep. Max Daily Amount: 5 mg.  acyclovir (ZOVIRAX) 400 mg tablet Take 1 Tab by mouth two (2) times a day.  Biktarvy tab tablet Take 1 Tab by mouth daily. 50mg/200 mg/25 mg per day.  aspirin delayed-release 81 mg tablet Take 1 Tab by mouth daily.  glucose blood VI test strips (OneTouch Verio test strips) strip Check glucose 3 times a day, 90 days E11.65, Z.79.4    insulin lispro (HumaLOG KwikPen Insulin) 100 unit/mL kwikpen Inject 15 units before each meal, 90 days    insulin glargine (LANTUS,BASAGLAR) 100 unit/mL (3 mL) inpn 10 Units by SubCUTAneous route nightly. Start 10 units at bedtime. 90-day supply will be  2700 units for 90-day supply (Patient taking differently: 10 Units by SubCUTAneous route nightly. Takes 12 units at bedtime increased to 17 units at bedtime. units at bedtime.)    glucose blood VI test strips (ASCENSIA AUTODISC VI, ONE TOUCH ULTRA TEST VI) strip to check blood sugar 3 times a day before each meal and in rotation being on insulin (Patient not taking: Reported on 3/18/2022)     No current facility-administered medications for this visit. Review of Systems   Constitutional: Negative for malaise/fatigue and weight loss. Eyes: Negative for blurred vision and double vision. Respiratory: Negative for cough and shortness of breath.     Cardiovascular: Negative for chest pain, palpitations and leg swelling. Gastrointestinal: Negative for heartburn and nausea. Musculoskeletal: Negative for joint pain and myalgias. Skin: Negative for itching and rash. Neurological: Negative for dizziness, tingling, loss of consciousness, weakness and headaches. Endo/Heme/Allergies: Does not bruise/bleed easily. Psychiatric/Behavioral: Negative for depression. The patient is not nervous/anxious. All other systems reviewed and are negative. Reviewed PmHx, FmHx, SocHx as well as meds and allergies, updated and dated in the chart. Objective     Visit Vitals  /86 (BP 1 Location: Left upper arm, BP Patient Position: Sitting, BP Cuff Size: Adult)   Pulse 85   Temp 98.3 °F (36.8 °C) (Oral)   Resp 16   Ht 5' 3\" (1.6 m)   Wt 191 lb 6.4 oz (86.8 kg)   SpO2 98%   BMI 33.90 kg/m²     Physical Exam  Vitals and nursing note reviewed. Constitutional:       Appearance: Normal appearance. She is normal weight. HENT:      Head: Normocephalic. Mouth/Throat:      Mouth: Mucous membranes are moist.   Eyes:      Extraocular Movements: Extraocular movements intact. Conjunctiva/sclera: Conjunctivae normal.      Pupils: Pupils are equal, round, and reactive to light. Cardiovascular:      Rate and Rhythm: Normal rate and regular rhythm. Pulses: Normal pulses. Heart sounds: Normal heart sounds. Pulmonary:      Effort: Pulmonary effort is normal.      Breath sounds: Normal breath sounds. Abdominal:      General: Abdomen is flat. Musculoskeletal:         General: Normal range of motion. Cervical back: Normal range of motion and neck supple. Skin:     General: Skin is warm and dry. Coloration: Skin is not jaundiced or pale. Findings: No bruising, erythema, lesion or rash. Neurological:      General: No focal deficit present. Mental Status: She is alert and oriented to person, place, and time.    Psychiatric:         Mood and Affect: Mood normal. Assessment and Plan     Diagnoses and all orders for this visit:    1. Chronic fatigue  -     SLEEP STUDY UNATTENDED, 4 CHANNEL    2. Chronic obstructive pulmonary disease, unspecified COPD type ([facility]  Assessment & Plan:   asymptomatic, continue current medications, medication adherence emphasized    Orders:  -     albuterol (PROVENTIL HFA, VENTOLIN HFA, PROAIR HFA) 90 mcg/actuation inhaler; INHALE 2 PUFFS BY MOUTH EVERY 4-6 HOURS AS FOR WHEEZING    3. Nausea  Assessment & Plan:   borderline controlled, continue current medications, medication adherence emphasized    Orders:  -     ondansetron (ZOFRAN ODT) 4 mg disintegrating tablet; Take 1 Tablet by mouth every eight (8) hours as needed for Nausea or Vomiting. 4. Hx of seasonal allergies  Assessment & Plan:   uncontrolled, changes made today: Rx for Flonase Sensimist sent to pharmacy. Orders:  -     fluticasone propionate (FLONASE) 50 mcg/actuation nasal spray; 2 Sprays by Both Nostrils route daily. Indications: inflammation of the nose due to an allergy    5. Essential (primary) hypertension  Assessment & Plan:   asymptomatic, continue current medications, medication adherence emphasized    Orders:  -     metoprolol succinate (TOPROL-XL) 25 mg XL tablet; Take 1 Tablet by mouth daily. 6. Human immunodeficiency virus (HIV) disease ([facility].)  -     multivit-min-FA-lycopen-lutein (Spectravite Adult 50 Plus) 0.4-300-250 mg-mcg-mcg tab; Take 1 Tablet by mouth daily. 7. Sleep apnea, unspecified type  -     SLEEP MEDICINE REFERRAL    8. Vitamin D deficiency  Assessment & Plan:   unclear control, continue current medications    Orders:  -     cholecalciferol (Vitamin D3) 25 mcg (1,000 unit) cap; Take 1 Capsule by mouth daily. Medication Side Effects and Warnings were discussed with patient. Patient Labs were reviewed and or requested. Patient Past Records were reviewed and or requested.     Follow-up and Dispositions    Return in about 3 months (around 7/21/2022). On this date 4/22/2022 I have spent 40 minutes reviewing previous notes, test results and face to face with the patient discussing the diagnosis and plan of care as well as documenting on the day of the visit. Please note that this dictation was completed with [employer], the computer voice recognition software. Quite often unanticipated grammatical, syntax, homophones, and other interpretive errors are inadvertently transcribed by the computer software. Please disregard these errors. Please excuse any errors that have escaped final proofreading. I have discussed the diagnosis with the patient and the intended plan as seen in the above orders. The patient has received an after-visit summary and questions were answered concerning future plans. I have discussed medication side effects and warnings with the patient as well. Krishna De La Torre, [address] [facility]  [address]

## 2022-04-21 NOTE — PROGRESS NOTES
Chief Complaint   Patient presents with    Medication Refill     Visit Vitals  /86 (BP 1 Location: Left upper arm, BP Patient Position: Sitting, BP Cuff Size: Adult)   Pulse 85   Temp 98.3 °F (36.8 °C) (Oral)   Resp 16   Ht 5' 3\" (1.6 m)   Wt 191 lb 6.4 oz (86.8 kg)   SpO2 98%   BMI 33.90 kg/m²     1. \"Have you been to the ER, urgent care clinic since your last visit? Hospitalized since your last visit? \" No    2. \"Have you seen or consulted any other health care providers outside of the [address] [place] [address] since your last visit? \" No     3. For patients aged 39-70: Has the patient had a colonoscopy / FIT/ Cologuard? Yes - no Care Gap present      If the patient is female:    4. For patients aged 41-77: Has the patient had a mammogram within the past 2 years? Yes - no Care Gap present      5. For patients aged 21-65: Has the patient had a pap smear?  No

## 2022-04-22 PROBLEM — R53.82 CHRONIC FATIGUE: Status: ACTIVE | Noted: 2022-04-22

## 2022-04-22 PROBLEM — B20 HUMAN IMMUNODEFICIENCY VIRUS (HIV) DISEASE (HCC): Status: RESOLVED | Noted: 2022-04-21 | Resolved: 2022-04-22

## 2022-04-22 PROBLEM — E55.9 VITAMIN D DEFICIENCY: Status: ACTIVE | Noted: 2020-09-08

## 2022-04-22 NOTE — PATIENT INSTRUCTIONS
Chronic Obstructive Pulmonary Disease (COPD): Care Instructions  Overview     Chronic obstructive pulmonary disease (COPD) is a lung disease that makes it hard to breathe. With COPD, the airways that lead to the lungs are narrowed, and the tiny air sacs in the lungs are damaged and lose their stretch. People with COPD have decreased airflow in and out of the lungs, which makes it hard to breathe. The airways also can get clogged with thick mucus. Cigarette smoking is a major cause of COPD. Although there is no cure for COPD, you can slow its progress. Following your treatment plan and taking care of yourself can help you feel better and live longer. Follow-up care is a key part of your treatment and safety. Be sure to make and go to all appointments, and call your doctor if you are having problems. It's also a good idea to know your test results and keep a list of the medicines you take. How can you care for yourself at home? Staying healthy    · Do not smoke. This is the most important step you can take to prevent more damage to your lungs. If you need help quitting, talk to your doctor about stop-smoking programs and medicines. These can increase your chances of quitting for good.     · Avoid colds and other infections. Get the pneumococcal and whooping cough (pertussis) vaccines. If you have had these vaccines before, ask your doctor if you need another dose. Get the flu vaccine every fall. If you must be around people with colds or the flu, wash your hands often.     · Avoid secondhand smoke and air pollution. Try to stay inside with your windows closed when air pollution is bad. Medicines and oxygen therapy    · Take your medicines exactly as prescribed. Call your doctor if you think you are having a problem with your medicine. You may be taking medicines such as:  ? Bronchodilators. These help open your airways and make breathing easier.  They are either short-acting (work for 4 to 9 hours) or long-acting (work for 12 to 24 hours). You inhale most bronchodilators, so they start to act quickly. Always carry your quick-relief inhaler with you in case you need it. ? Corticosteroids (prednisone, budesonide). These reduce airway inflammation. They come in inhaled or pill form.     · Ask your doctor or pharmacist if you are using each type of inhaler correctly. With correct use, the medicine is more likely to get to your lungs.     · See if a spacer is right for you. A spacer may also help you get more inhaled medicine to your lungs. If you use one, ask how to use it properly.     · Do not take any vitamins, over-the-counter medicine, or herbal products without talking to your doctor first.     · If your doctor prescribed antibiotics, take them as directed. Do not stop taking them just because you feel better. You need to take the full course of antibiotics.     · If you use oxygen therapy, use the flow rate your doctor has recommended. Don't change it without talking to your doctor first. Oxygen therapy boosts the amount of oxygen in your blood and helps you breathe easier. Activity    · Get regular exercise. Walking is an easy way to get exercise. Start out slowly, and walk a little more each day.     · Pay attention to your breathing. You are exercising too hard if you can't talk while you exercise.     · Take short rest breaks when doing household chores and other activities.     · Learn breathing methods--such as breathing through pursed lips--to help you become less short of breath.     · If your doctor has not set you up with a pulmonary rehabilitation program, ask if rehab is right for you. Rehab includes exercise programs, education about your disease and how to manage it, help with diet and other changes, and emotional support.    Diet    · Eat regular, healthy meals.     · Use bronchodilators about 1 hour before you eat to make it easier to eat.     · Eat several smaller, frequent meals to prevent getting too full. A full stomach can push on the muscle that helps you breathe (your diaphragm) and make it harder to breathe.     · Drink beverages at the end of the meal.     · Avoid foods that are hard to chew.     · Eat foods that contain protein so you don't lose muscle mass.     · Talk with your doctor if you gain too much weight or if you lose weight without trying. Mental health    · Talk to your family, friends, or a therapist about your feelings. Some people feel frightened, angry, hopeless, helpless, and even guilty. Talking openly about feelings may help you cope. If these feelings last, talk to your doctor. When should you call for help? Call 911 anytime you think you may need emergency care. For example, call if:    · You have severe trouble breathing.     · You are having chest pain that is different or worse than usual.   Call your doctor now or seek immediate medical care if:    · You have new or worse trouble breathing.     · You cough up blood.     · You have a fever.     · You have feelings of anxiety or depression. Watch closely for changes in your health, and be sure to contact your doctor if:    · You cough more deeply or more often, especially if you notice more mucus or a change in the color of your mucus.     · You have new or worse swelling in your legs or belly.     · You are not getting better as expected. Where can you learn more? Go to http://www.gray.com/  Enter D587 in the search box to learn more about \"Chronic Obstructive Pulmonary Disease (COPD): Care Instructions. \"  Current as of: July 6, 2021               Content Version: 13.2  © 2006-2022 [employer]. Care instructions adapted under license by [employer] (which disclaims liability or warranty for this information).  If you have questions about a medical condition or this instruction, always ask your healthcare professional. [place] [address] any warranty or liability for your use of this information.

## 2022-06-14 DIAGNOSIS — I10 ESSENTIAL (PRIMARY) HYPERTENSION: ICD-10-CM

## 2022-06-14 RX ORDER — METOPROLOL SUCCINATE 25 MG/1
25 TABLET, EXTENDED RELEASE ORAL DAILY
Qty: 90 TABLET | Refills: 0 | Status: SHIPPED | OUTPATIENT
Start: 2022-06-14 | End: 2022-07-28 | Stop reason: SDUPTHER

## 2022-07-05 ENCOUNTER — PATIENT OUTREACH (OUTPATIENT)
Dept: CASE MANAGEMENT | Age: 57
End: 2022-07-05

## 2022-07-05 NOTE — PROGRESS NOTES
07/05/22  1:51 pm     ACM made outreach attempt to follow up with patient. ACM dialed FO#[phone], patient was unavailable at the time of call. ACM was not able to leave a voice message, voice mailbox is full. Patient has Mychart, \"get in touch\" message sent via [address] [place] [address] [phone].      Tabitha Loza RN BSN - Ambulatory Care Manager - ph. [phone]

## 2022-07-25 ENCOUNTER — PATIENT OUTREACH (OUTPATIENT)
Dept: CASE MANAGEMENT | Age: 57
End: 2022-07-25

## 2022-07-25 NOTE — PROGRESS NOTES
07/25/22    Ambulatory Care Management Note        Date/Time:  7/25/2022 3:07 PM    This patient was received as a referral from  Daily assignment for case management. Multiple unsuccessful attempts have been made to contact patient. Ambulatory Care Management get in touch letter mailed to the patients address on file. No further outreach attempts are scheduled by [facility] at this time. Patient has graduated from the Complex Case Management  program on 07/25/22    Patient/family has disengaged and ACM has been unable to contact patient either by phone or [address] No further Ambulatory Care Manager follow up scheduled. Goals Addressed    None         Patient has Ambulatory Care Manager's contact information for any further questions, concerns, or needs. Patients upcoming visits:  No future appointments.

## 2022-07-27 ENCOUNTER — HOSPITAL ENCOUNTER (EMERGENCY)
Age: 57
Discharge: HOME | End: 2022-07-27
Attending: EMERGENCY MEDICINE
Payer: MEDICARE

## 2022-07-27 ENCOUNTER — APPOINTMENT (OUTPATIENT)
Dept: CT IMAGING | Age: 57
End: 2022-07-27
Attending: EMERGENCY MEDICINE
Payer: MEDICARE

## 2022-07-27 VITALS
TEMPERATURE: 98.3 F | OXYGEN SATURATION: 100 % | DIASTOLIC BLOOD PRESSURE: 98 MMHG | SYSTOLIC BLOOD PRESSURE: 120 MMHG | RESPIRATION RATE: 20 BRPM | HEART RATE: 106 BPM

## 2022-07-27 DIAGNOSIS — R11.2 NAUSEA AND VOMITING, UNSPECIFIED VOMITING TYPE: Primary | ICD-10-CM

## 2022-07-27 DIAGNOSIS — R20.2 TINGLING: ICD-10-CM

## 2022-07-27 DIAGNOSIS — D17.0 LIPOMA OF HEAD: ICD-10-CM

## 2022-07-27 LAB
ALBUMIN SERPL-MCNC: 3.6 G/DL (ref 3.5–5)
ALBUMIN/GLOB SERPL: 0.8 {RATIO} (ref 1.1–2.2)
ALP SERPL-CCNC: 79 U/L (ref 45–117)
ALT SERPL-CCNC: 38 U/L (ref 12–78)
ANION GAP SERPL CALC-SCNC: 8 MMOL/L (ref 5–15)
APPEARANCE UR: ABNORMAL
AST SERPL W P-5'-P-CCNC: 31 U/L (ref 15–37)
BACTERIA URNS QL MICRO: NEGATIVE /HPF
BASOPHILS # BLD: 0 K/UL (ref 0–0.1)
BASOPHILS NFR BLD: 1 % (ref 0–1)
BILIRUB SERPL-MCNC: 0.5 MG/DL (ref 0.2–1)
BILIRUB UR QL: NEGATIVE
BUN SERPL-MCNC: 17 MG/DL (ref 6–20)
BUN/CREAT SERPL: 13 (ref 12–20)
CA-I BLD-MCNC: 9.1 MG/DL (ref 8.5–10.1)
CHLORIDE SERPL-SCNC: 103 MMOL/L (ref 97–108)
CO2 SERPL-SCNC: 25 MMOL/L (ref 21–32)
COLOR UR: YELLOW
CREAT SERPL-MCNC: 1.27 MG/DL (ref 0.55–1.02)
DIFFERENTIAL METHOD BLD: ABNORMAL
EOSINOPHIL # BLD: 0.2 K/UL (ref 0–0.4)
EOSINOPHIL NFR BLD: 2 % (ref 0–7)
ERYTHROCYTE [DISTWIDTH] IN BLOOD BY AUTOMATED COUNT: 12.4 % (ref 11.5–14.5)
GLOBULIN SER CALC-MCNC: 4.8 G/DL (ref 2–4)
GLUCOSE SERPL-MCNC: 193 MG/DL (ref 65–100)
GLUCOSE UR STRIP.AUTO-MCNC: >300 MG/DL
HCT VFR BLD AUTO: 41 % (ref 35–47)
HGB BLD-MCNC: 13.4 G/DL (ref 11.5–16)
HGB UR QL STRIP: ABNORMAL
HYALINE CASTS URNS QL MICRO: ABNORMAL /LPF (ref 0–5)
IMM GRANULOCYTES # BLD AUTO: 0 K/UL (ref 0–0.04)
IMM GRANULOCYTES NFR BLD AUTO: 0 % (ref 0–0.5)
KETONES UR QL STRIP.AUTO: NEGATIVE MG/DL
LEUKOCYTE ESTERASE UR QL STRIP.AUTO: ABNORMAL
LYMPHOCYTES # BLD: 3.4 K/UL (ref 0.8–3.5)
LYMPHOCYTES NFR BLD: 51 % (ref 12–49)
MCH RBC QN AUTO: 27.2 PG (ref 26–34)
MCHC RBC AUTO-ENTMCNC: 32.7 G/DL (ref 30–36.5)
MCV RBC AUTO: 83.3 FL (ref 80–99)
MONOCYTES # BLD: 0.7 K/UL (ref 0–1)
MONOCYTES NFR BLD: 11 % (ref 5–13)
NEUTS SEG # BLD: 2.3 K/UL (ref 1.8–8)
NEUTS SEG NFR BLD: 35 % (ref 32–75)
NITRITE UR QL STRIP.AUTO: NEGATIVE
NRBC # BLD: 0 K/UL (ref 0–0.01)
NRBC BLD-RTO: 0 PER 100 WBC
PH UR STRIP: 5 [PH] (ref 5–8)
PLATELET # BLD AUTO: 199 K/UL (ref 150–400)
PMV BLD AUTO: 12.6 FL (ref 8.9–12.9)
POTASSIUM SERPL-SCNC: 3.7 MMOL/L (ref 3.5–5.1)
PROT SERPL-MCNC: 8.4 G/DL (ref 6.4–8.2)
PROT UR STRIP-MCNC: NEGATIVE MG/DL
RBC # BLD AUTO: 4.92 M/UL (ref 3.8–5.2)
RBC #/AREA URNS HPF: ABNORMAL /HPF (ref 0–5)
SODIUM SERPL-SCNC: 136 MMOL/L (ref 136–145)
SP GR UR REFRACTOMETRY: 1.01 (ref 1–1.03)
UA: UC IF INDICATED,UAUC: ABNORMAL
UROBILINOGEN UR QL STRIP.AUTO: 0.1 EU/DL (ref 0.1–1)
WBC # BLD AUTO: 6.6 K/UL (ref 3.6–11)
WBC URNS QL MICRO: ABNORMAL /HPF (ref 0–4)

## 2022-07-27 PROCEDURE — 99284 EMERGENCY DEPT VISIT MOD MDM: CPT

## 2022-07-27 PROCEDURE — 70450 CT HEAD/BRAIN W/O DYE: CPT

## 2022-07-27 PROCEDURE — 74011250636 HC RX REV CODE- 250/636: Performed by: EMERGENCY MEDICINE

## 2022-07-27 PROCEDURE — 96374 THER/PROPH/DIAG INJ IV PUSH: CPT

## 2022-07-27 PROCEDURE — 80053 COMPREHEN METABOLIC PANEL: CPT

## 2022-07-27 PROCEDURE — 81001 URINALYSIS AUTO W/SCOPE: CPT

## 2022-07-27 PROCEDURE — 85025 COMPLETE CBC W/AUTO DIFF WBC: CPT

## 2022-07-27 RX ORDER — ONDANSETRON 4 MG/1
4 TABLET, ORALLY DISINTEGRATING ORAL
Qty: 12 TABLET | Refills: 0 | Status: SHIPPED | OUTPATIENT
Start: 2022-07-27 | End: 2022-07-31

## 2022-07-27 RX ORDER — ONDANSETRON 2 MG/ML
4 INJECTION INTRAMUSCULAR; INTRAVENOUS
Status: COMPLETED | OUTPATIENT
Start: 2022-07-27 | End: 2022-07-27

## 2022-07-27 RX ORDER — SODIUM CHLORIDE 9 MG/ML
1000 INJECTION, SOLUTION INTRAVENOUS ONCE
Status: COMPLETED | OUTPATIENT
Start: 2022-07-27 | End: 2022-07-27

## 2022-07-27 RX ADMIN — SODIUM CHLORIDE 1000 ML: 9 INJECTION, SOLUTION INTRAVENOUS at 14:08

## 2022-07-27 RX ADMIN — ONDANSETRON 4 MG: 2 INJECTION INTRAMUSCULAR; INTRAVENOUS at 14:10

## 2022-07-27 NOTE — ED PROVIDER NOTES
EMERGENCY DEPARTMENT HISTORY AND PHYSICAL EXAM      Date: 7/27/2022  Patient Name: Lan Flor    History of Presenting Illness     Chief Complaint   Patient presents with    Vomiting     History Provided By: Patient    HPI: Lan Flor, 62 y.o. female with past medical history of hypertension, diabetes, COPD, and CVA who presents with tingling in her hands, nausea, vomiting, and cold sweats. She is having some mild dysuria as well. Symptoms started yesterday. Denies any abdominal pain, fevers, or other associate symptoms. No weakness or headache. There are no other complaints, changes, or physical findings at this time. PCP: Tracy Sarkar MD    Current Outpatient Medications   Medication Sig Dispense Refill    ondansetron (ZOFRAN ODT) 4 mg disintegrating tablet Take 1 Tablet by mouth every eight (8) hours as needed for Nausea or Vomiting for up to 4 days. 12 Tablet 0    metoprolol succinate (TOPROL-XL) 25 mg XL tablet Take 1 Tablet by mouth daily. 90 Tablet 0    albuterol (PROVENTIL HFA, VENTOLIN HFA, PROAIR HFA) 90 mcg/actuation inhaler INHALE 2 PUFFS BY MOUTH EVERY 4-6 HOURS AS FOR WHEEZING 1 Each 3    fluticasone propionate (FLONASE) 50 mcg/actuation nasal spray 2 Sprays by Both Nostrils route daily. Indications: inflammation of the nose due to an allergy 1 Each 2    cholecalciferol (Vitamin D3) 25 mcg (1,000 unit) cap Take 1 Capsule by mouth daily. 90 Capsule 1    multivit-min-FA-lycopen-lutein (Spectravite Adult 50 Plus) 0.4-300-250 mg-mcg-mcg tab Take 1 Tablet by mouth daily.  90 Tablet 1    lancets misc Check glucose 3 times a day, 90 days E11.65, Z 79.4 300 Each 2    glucose blood VI test strips (OneTouch Verio test strips) strip Check glucose 3 times a day, 90 days E11.65, Z.79.4 300 Strip 2    insulin lispro (HumaLOG KwikPen Insulin) 100 unit/mL kwikpen Inject 15 units before each meal, 90 days 45 mL 2    triamterene-hydroCHLOROthiazide (DYAZIDE) 37.5-25 mg per capsule TAKE 1 CAPSULE BY MOUTH EVERY MORNING. STOP LOSARTAN HCTZ. 90 Capsule 0    insulin glargine (LANTUS,BASAGLAR) 100 unit/mL (3 mL) inpn Inject 35 units at bedtime, 90 days 36 mL 1    Insulin Needles, Disposable, 31 gauge x 5/16\" ndle 4 times a day 200 Each 1    insulin lispro (HUMALOG) 100 unit/mL kwikpen 15 units before each meal, 90 days 45 mL 2    omeprazole (PRILOSEC) 40 mg capsule TAKE 1 CAP BY MOUTH DAILY. ONE CAP 30 MINUTES BEFORE EATING ONCE A FOOD. 90 Capsule 1    montelukast (SINGULAIR) 10 mg tablet Take 1 Tablet by mouth daily. 90 Tablet 1    insulin glargine (LANTUS,BASAGLAR) 100 unit/mL (3 mL) inpn 10 Units by SubCUTAneous route nightly. Start 10 units at bedtime. 90-day supply will be  2700 units for 90-day supply (Patient taking differently: 10 Units by SubCUTAneous route nightly. Takes 12 units at bedtime increased to 17 units at bedtime. units at bedtime.) 6 Pen 1    glucose blood VI test strips (ASCENSIA AUTODISC VI, ONE TOUCH ULTRA TEST VI) strip to check blood sugar 3 times a day before each meal and in rotation being on insulin (Patient not taking: Reported on 3/18/2022) 100 Strip 3    cetirizine (ZYRTEC) 10 mg tablet Take 1 Tab by mouth daily. 30 Tab 5    Symbicort 160-4.5 mcg/actuation HFAA Take 2 Puffs by inhalation two (2) times a day. 1 Inhaler 5    zolpidem (AMBIEN) 5 mg tablet Take 1 Tab by mouth nightly as needed for Sleep. Max Daily Amount: 5 mg. 30 Tab 2    acyclovir (ZOVIRAX) 400 mg tablet Take 1 Tab by mouth two (2) times a day. Biktarvy tab tablet Take 1 Tab by mouth daily. 50mg/200 mg/25 mg per day. aspirin delayed-release 81 mg tablet Take 1 Tab by mouth daily.          Past History   Past Medical History:  Past Medical History:   Diagnosis Date    Abnormal weight gain 9/8/2020    Abnormal weight gain 9/9/2020    Anemia     Anemia     Arthritis     Asthma     Cellulitis and abscess of lower extremity 9/9/2020    Chronic back pain 12/8/2020    Chronic kidney disease     Chronic obstructive pulmonary disease (HCC)     Chronic pain     Chronic sinusitis 9/8/2020    Contact dermatitis and eczema due to cause     COPD (chronic obstructive pulmonary disease) ([facility] 9/9/2020    Depression     Depressive disorder 9/9/2020    Diabetes ([facility]     DM (diabetes mellitus), type 2 ([facility] 9/9/2020    DM (diabetes mellitus), type 2, uncontrolled 9/8/2020    Dyslipidemia 9/9/2020    Dysphagia 9/8/2020    Dysphagia 9/9/2020    Essential hypertension 9/8/2020    Essential hypertension 9/9/2020    GERD (gastroesophageal reflux disease) 9/8/2020    GERD (gastroesophageal reflux disease)     Headache     Hemorrhoids 9/8/2020    HIV (human immunodeficiency virus infection) ([facility]     Hypercholesterolemia     Hypertension     Hypokalemia 9/8/2020    Insomnia 9/8/2020    Insomnia 9/9/2020    Mononeuritis 9/8/2020    Mononeuritis 9/9/2020    Neck pain 9/9/2020    Noncompliance with treatment 9/8/2020    Noncompliance with treatment 9/9/2020    Pain in throat 9/8/2020    Pain in throat 9/9/2020    Pruritus of skin 9/8/2020    TIA (transient ischemic attack) 9/8/2020    TIA (transient ischemic attack) 9/9/2020    TIA (transient ischemic attack) 9/9/2020    Tick bite 9/8/2020    Tick bite 9/9/2020    Tobacco dependence 9/8/2020    Vitamin D deficiency 9/8/2020    Vitamin D deficiency 9/9/2020        Past Surgical History:  Past Surgical History:   Procedure Laterality Date    HX APPENDECTOMY      HX APPENDECTOMY  2012    HX COLONOSCOPY      HX COLONOSCOPY  06/25/2017    HX GI      HX GYN      HX HYSTERECTOMY      HX ORTHOPAEDIC      HX TOTAL LAPAROSCOPIC HYSTERECTOMY  2013    VASCULAR SURGERY PROCEDURE UNLIST         Family History:  Family History   Problem Relation Age of Onset    Hypertension Mother     Thyroid Disease Mother     Heart Disease Father     Hypertension Father     Diabetes Father     Prostate Cancer Father     Diabetes Maternal Grandmother     Prostate Cancer Brother        Social History:  Social History     Tobacco Use    Smoking status: Former     Packs/day: 1.00     Years: 15.00     Pack years: 15.00     Types: Cigarettes     Quit date: 2019     Years since quitting: 3.5    Smokeless tobacco: Never   Vaping Use    Vaping Use: Never used   Substance Use Topics    Alcohol use: Not Currently    Drug use: Never       Allergies: Allergies   Allergen Reactions    Latex Unknown (comments)    Lisinopril Swelling     SWELLING OF THE THROAT    Lisinopril Other (comments)     PT.  States causes swelling lf the throat    Bactrim [Sulfamethoprim] Hives    Elavil Hives    Iron Nausea and Vomiting    Lyrica [Pregabalin] Hives    Adhesive Rash    Sulfamethoxazole Unknown (comments)    Trimethoprim Unknown (comments)        Review of Systems   Review of Systems   Constitutional:  Positive for diaphoresis. Negative for fever. HENT:  Negative for congestion. Eyes:  Negative for visual disturbance. Respiratory:  Negative for shortness of breath. Cardiovascular:  Negative for chest pain. Gastrointestinal:  Positive for nausea and vomiting. Negative for abdominal pain. Genitourinary:  Negative for dysuria. Musculoskeletal:  Negative for arthralgias. Skin:  Negative for rash. Neurological:  Negative for headaches. Physical Exam   Constitutional: No acute distress. Well-nourished. Skin: No rash. ENT: No rhinorrhea. No cough. Head is normocephalic and atraumatic. Eye: No proptosis or conjunctival injections. Respiratory: No apparent respiratory distress. Lung sounds are clear. Cardiovascular: Regular rate and rhythm. No murmurs. Gastrointestinal: Nondistended. Nontender. Musculoskeletal: No obvious bony deformities. Neuro: 5/5 strength in upper and lower extremities. No focal deficit.     Lab and Diagnostic Study Results   Labs -     Recent Results (from the past 12 hour(s))   URINALYSIS W/ REFLEX CULTURE    Collection Time: 07/27/22  1:30 PM    Specimen: Urine   Result Value Ref Range Color Yellow      Appearance Hazy (A) Clear      Specific gravity 1.015 1.003 - 1.030      pH (UA) 5.0 5.0 - 8.0      Protein Negative Negative mg/dL    Glucose >300 (A) Negative mg/dL    Ketone Negative Negative mg/dL    Bilirubin Negative Negative      Blood Trace (A) Negative      Urobilinogen 0.1 0.1 - 1.0 EU/dL    Nitrites Negative Negative      Leukocyte Esterase Large (A) Negative      WBC 0-4 0 - 4 /hpf    RBC 5-10 0 - 5 /hpf    Bacteria Negative Negative /hpf    UA:UC IF INDICATED Culture not indicated by UA result Culture not indicated by UA result      Hyaline cast 0-2 0 - 5 /lpf   CBC WITH AUTOMATED DIFF    Collection Time: 07/27/22  1:57 PM   Result Value Ref Range    WBC 6.6 3.6 - 11.0 K/uL    RBC 4.92 3.80 - 5.20 M/uL    HGB 13.4 11.5 - 16.0 g/dL    HCT 41.0 35.0 - 47.0 %    MCV 83.3 80.0 - 99.0 FL    MCH 27.2 26.0 - 34.0 PG    MCHC 32.7 30.0 - 36.5 g/dL    RDW 12.4 11.5 - 14.5 %    PLATELET 576 914 - 928 K/uL    MPV 12.6 8.9 - 12.9 FL    NRBC 0.0 0.0  WBC    ABSOLUTE NRBC 0.00 0.00 - 0.01 K/uL    NEUTROPHILS 35 32 - 75 %    LYMPHOCYTES 51 (H) 12 - 49 %    MONOCYTES 11 5 - 13 %    EOSINOPHILS 2 0 - 7 %    BASOPHILS 1 0 - 1 %    IMMATURE GRANULOCYTES 0 0 - 0.5 %    ABS. NEUTROPHILS 2.3 1.8 - 8.0 K/UL    ABS. LYMPHOCYTES 3.4 0.8 - 3.5 K/UL    ABS. MONOCYTES 0.7 0.0 - 1.0 K/UL    ABS. EOSINOPHILS 0.2 0.0 - 0.4 K/UL    ABS. BASOPHILS 0.0 0.0 - 0.1 K/UL    ABS. IMM.  GRANS. 0.0 0.00 - 0.04 K/UL    DF AUTOMATED     METABOLIC PANEL, COMPREHENSIVE    Collection Time: 07/27/22  1:57 PM   Result Value Ref Range    Sodium 136 136 - 145 mmol/L    Potassium 3.7 3.5 - 5.1 mmol/L    Chloride 103 97 - 108 mmol/L    CO2 25 21 - 32 mmol/L    Anion gap 8 5 - 15 mmol/L    Glucose 193 (H) 65 - 100 mg/dL    BUN 17 6 - 20 mg/dL    Creatinine 1.27 (H) 0.55 - 1.02 mg/dL    BUN/Creatinine ratio 13 12 - 20      GFR est AA 53 (L) >60 ml/min/1.73m2    GFR est non-AA 43 (L) >60 ml/min/1.73m2    Calcium 9.1 8.5 - 10.1 mg/dL Bilirubin, total 0.5 0.2 - 1.0 mg/dL    AST (SGOT) 31 15 - 37 U/L    ALT (SGPT) 38 12 - 78 U/L    Alk. phosphatase 79 45 - 117 U/L    Protein, total 8.4 (H) 6.4 - 8.2 g/dL    Albumin 3.6 3.5 - 5.0 g/dL    Globulin 4.8 (H) 2.0 - 4.0 g/dL    A-G Ratio 0.8 (L) 1.1 - 2.2         Radiologic Studies -   [unfilled]  CT Results  (Last 48 hours)                 07/27/22 1536  CT HEAD WO CONT Final result    Impression:  1. No acute intracranial abnormality. 2.  Enlarging left frontal scalp next soft tissue and fat density lesion. Consider correlation with physical exam.               Narrative:  EXAM: CT HEAD WO CONT       INDICATION: hand tingling and numbness, history of stroke       COMPARISON: MRI brain 3/19/2019, CT head 3/17/2019. CONTRAST: None. TECHNIQUE: Unenhanced CT of the head was performed using 5 mm images. Brain and   bone windows were generated. Coronal and sagittal reformats. CT dose reduction   was achieved through use of a standardized protocol tailored for this   examination and automatic exposure control for dose modulation. FINDINGS:   The ventricles and sulci are normal in size, shape and configuration. . There is   no significant white matter disease. There is no intracranial hemorrhage,   extra-axial collection, or mass effect. The basilar cisterns are open. No CT   evidence of acute infarct. The bone windows demonstrate no abnormalities. The right maxillary sinus mucous   retention cyst. Mastoid air cells are clear. Enlarging 13 mm mixed soft tissue   and fatty density lesion in the left frontal scalp (202-20), previously 9 mm                 CXR Results  (Last 48 hours)      None            Medical Decision Making and ED Course   - I am the first and primary provider for this patient AND AM THE PRIMARY PROVIDER OF RECORD. I reviewed the vital signs, available nursing notes, past medical history, past surgical history, family history and social history.     - Initial assessment performed. The patients presenting problems have been discussed, and the staff are in agreement with the care plan formulated and outlined with them. I have encouraged them to ask questions as they arise throughout their visit. Vital Signs-Reviewed the patient's vital signs. Patient Vitals for the past 12 hrs:   Temp Pulse Resp BP SpO2   07/27/22 1404 -- (!) 106 20 (!) 120/98 100 %   07/27/22 1326 98.3 °F (36.8 °C) (!) 108 18 (!) 116/90 99 %     MDM  The differential diagnosis is electrolyte abnormality, dehydration, urinary tract infection, stroke, anxiety. Work-up is unremarkable. CT scan does show a small mass on her scalp. I would close her this and it does appear to be likely a lipoma on the scalp. Does not appear to be infected or an abscess. Neurological exam is normal.  Feel no need for further testing regarding her tingling. Unclear etiology of nausea and vomiting. Will prescribe Zofran. Discharged home. Disposition   Disposition: Discharged    DISCHARGE PLAN:  1. Current Discharge Medication List        CONTINUE these medications which have NOT CHANGED    Details   metoprolol succinate (TOPROL-XL) 25 mg XL tablet Take 1 Tablet by mouth daily. Qty: 90 Tablet, Refills: 0    Associated Diagnoses: Essential (primary) hypertension      albuterol (PROVENTIL HFA, VENTOLIN HFA, PROAIR HFA) 90 mcg/actuation inhaler INHALE 2 PUFFS BY MOUTH EVERY 4-6 HOURS AS FOR WHEEZING  Qty: 1 Each, Refills: 3    Associated Diagnoses: Chronic obstructive pulmonary disease, unspecified COPD type (HCC)      ondansetron (ZOFRAN ODT) 4 mg disintegrating tablet Take 1 Tablet by mouth every eight (8) hours as needed for Nausea or Vomiting. Qty: 30 Tablet, Refills: 1    Associated Diagnoses: Nausea      fluticasone propionate (FLONASE) 50 mcg/actuation nasal spray 2 Sprays by Both Nostrils route daily.  Indications: inflammation of the nose due to an allergy  Qty: 1 Each, Refills: 2    Associated Diagnoses: Hx of seasonal allergies      cholecalciferol (Vitamin D3) 25 mcg (1,000 unit) cap Take 1 Capsule by mouth daily. Qty: 90 Capsule, Refills: 1    Associated Diagnoses: Vitamin D deficiency      multivit-min-FA-lycopen-lutein (Spectravite Adult 50 Plus) 0.4-300-250 mg-mcg-mcg tab Take 1 Tablet by mouth daily. Qty: 90 Tablet, Refills: 1    Associated Diagnoses: Human immunodeficiency virus (HIV) disease ([facility]      lancets misc Check glucose 3 times a day, 90 days E11.65, Z 79.4  Qty: 300 Each, Refills: 2    Comments: Patient is on insulin  Associated Diagnoses: Type 2 diabetes mellitus with hyperglycemia, with long-term current use of insulin ([facility]      ! ! glucose blood VI test strips (OneTouch Verio test strips) strip Check glucose 3 times a day, 90 days E11.65, Z.79.4  Qty: 300 Strip, Refills: 2    Comments: Patient is on insulin. Medicare should cover to check 3 times a day. Associated Diagnoses: Type 2 diabetes mellitus with hyperglycemia, with long-term current use of insulin ([facility] Encounter for long-term ([facility]      ! ! insulin lispro (HumaLOG KwikPen Insulin) 100 unit/mL kwikpen Inject 15 units before each meal, 90 days  Qty: 45 mL, Refills: 2    Associated Diagnoses: Type 2 diabetes mellitus with hyperglycemia, with long-term current use of insulin ([facility])      triamterene-hydroCHLOROthiazide (DYAZIDE) 37.5-25 mg per capsule TAKE 1 CAPSULE BY MOUTH EVERY MORNING. STOP LOSARTAN HCTZ. Qty: 90 Capsule, Refills: 0      !! insulin glargine (LANTUS,BASAGLAR) 100 unit/mL (3 mL) inpn Inject 35 units at bedtime, 90 days  Qty: 36 mL, Refills: 1    Associated Diagnoses: Type 2 diabetes mellitus with hyperglycemia, with long-term current use of insulin (HCC)      Insulin Needles, Disposable, 31 gauge x 5/16\" ndle 4 times a day  Qty: 200 Each, Refills: 1    Associated Diagnoses: Type 2 diabetes mellitus with hyperglycemia, with long-term current use of insulin ([facility]      ! ! insulin lispro (HUMALOG) 100 unit/mL kwikpen 15 units before each meal, 90 days  Qty: 45 mL, Refills: 2    Associated Diagnoses: Type 2 diabetes mellitus with hyperglycemia, with long-term current use of insulin (HCC)      omeprazole (PRILOSEC) 40 mg capsule TAKE 1 CAP BY MOUTH DAILY. ONE CAP 30 MINUTES BEFORE EATING ONCE A FOOD. Qty: 90 Capsule, Refills: 1      montelukast (SINGULAIR) 10 mg tablet Take 1 Tablet by mouth daily. Qty: 90 Tablet, Refills: 1      !! insulin glargine (LANTUS,BASAGLAR) 100 unit/mL (3 mL) inpn 10 Units by SubCUTAneous route nightly. Start 10 units at bedtime. 90-day supply will be  2700 units for 90-day supply  Qty: 6 Pen, Refills: 1      !! glucose blood VI test strips (ASCENSIA AUTODISC VI, ONE TOUCH ULTRA TEST VI) strip to check blood sugar 3 times a day before each meal and in rotation being on insulin  Qty: 100 Strip, Refills: 3      cetirizine (ZYRTEC) 10 mg tablet Take 1 Tab by mouth daily. Qty: 30 Tab, Refills: 5      Symbicort 160-4.5 mcg/actuation HFAA Take 2 Puffs by inhalation two (2) times a day. Qty: 1 Inhaler, Refills: 5      zolpidem (AMBIEN) 5 mg tablet Take 1 Tab by mouth nightly as needed for Sleep. Max Daily Amount: 5 mg. Qty: 30 Tab, Refills: 2    Associated Diagnoses: Psychophysiological insomnia      acyclovir (ZOVIRAX) 400 mg tablet Take 1 Tab by mouth two (2) times a day. Biktarvy tab tablet Take 1 Tab by mouth daily. 50mg/200 mg/25 mg per day. aspirin delayed-release 81 mg tablet Take 1 Tab by mouth daily. !! - Potential duplicate medications found. Please discuss with provider. 2.   Follow-up Information       Follow up With Specialties Details Why [facility] EMERGENCY DEPT Emergency Medicine Go today As soon as possible if symptoms worsen [address] [address] [phone]    Primary care doctor  Schedule an appointment as soon as possible for a visit in 3 days            3. Return to ED if worse   4. Current Discharge Medication List        START taking these medications    Details   ondansetron (ZOFRAN ODT) 4 mg disintegrating tablet Take 1 Tablet by mouth every eight (8) hours as needed for Nausea or Vomiting for up to 4 days. Qty: 12 Tablet, Refills: 0  Start date: 7/27/2022, End date: 7/31/2022              Diagnosis/Clinical Impression   Clinical Impression:   1. Nausea and vomiting, unspecified vomiting type    2. Lipoma of head    3. Tingling           Attestations:  Aroldo Leigh, DO    Please note that this dictation was completed with [employer], the computer voice recognition software. Quite often unanticipated grammatical, syntax, homophones, and other interpretive errors are inadvertently transcribed by the computer software. Please disregard these errors. Please excuse any errors that have escaped final proofreading. Thank you.

## 2022-07-27 NOTE — Clinical Note
[facility] EMERGENCY DEPT  [address] [place] [address] [place] [address]  [phone]    Work/School Note    Date: 7/27/2022    To Whom It May concern:    Teetee James was seen and treated today in the emergency room by the following provider(s):  Attending Provider: Kyle Soto is excused from work/school on 07/27/22 and 07/28/22. She is medically clear to return to work/school on 7/29/2022.        Sincerely,          Shobha Currie, DO

## 2022-07-28 ENCOUNTER — OFFICE VISIT (OUTPATIENT)
Dept: INTERNAL MEDICINE CLINIC | Age: 57
End: 2022-07-28
Payer: MEDICARE

## 2022-07-28 VITALS
SYSTOLIC BLOOD PRESSURE: 118 MMHG | TEMPERATURE: 97.7 F | BODY MASS INDEX: 33.13 KG/M2 | HEIGHT: 63 IN | DIASTOLIC BLOOD PRESSURE: 87 MMHG | RESPIRATION RATE: 16 BRPM | OXYGEN SATURATION: 100 % | WEIGHT: 187 LBS | HEART RATE: 99 BPM

## 2022-07-28 DIAGNOSIS — K21.9 GASTROESOPHAGEAL REFLUX DISEASE WITHOUT ESOPHAGITIS: ICD-10-CM

## 2022-07-28 DIAGNOSIS — I10 ESSENTIAL (PRIMARY) HYPERTENSION: ICD-10-CM

## 2022-07-28 DIAGNOSIS — Z11.59 NEED FOR HEPATITIS C SCREENING TEST: ICD-10-CM

## 2022-07-28 DIAGNOSIS — J44.9 CHRONIC OBSTRUCTIVE PULMONARY DISEASE, UNSPECIFIED COPD TYPE (HCC): ICD-10-CM

## 2022-07-28 DIAGNOSIS — Z79.4 LONG TERM (CURRENT) USE OF INSULIN (HCC): ICD-10-CM

## 2022-07-28 DIAGNOSIS — N18.31 STAGE 3A CHRONIC KIDNEY DISEASE (HCC): ICD-10-CM

## 2022-07-28 DIAGNOSIS — E11.65 UNCONTROLLED TYPE 2 DIABETES MELLITUS WITH HYPERGLYCEMIA (HCC): ICD-10-CM

## 2022-07-28 DIAGNOSIS — R11.2 NAUSEA AND VOMITING, UNSPECIFIED VOMITING TYPE: Primary | ICD-10-CM

## 2022-07-28 DIAGNOSIS — F32.A DEPRESSIVE DISORDER: ICD-10-CM

## 2022-07-28 DIAGNOSIS — E78.2 MIXED HYPERLIPIDEMIA: ICD-10-CM

## 2022-07-28 PROBLEM — F17.200 TOBACCO DEPENDENCE: Status: RESOLVED | Noted: 2020-09-08 | Resolved: 2022-07-28

## 2022-07-28 PROBLEM — F17.200 TOBACCO DEPENDENCE SYNDROME: Status: RESOLVED | Noted: 2020-12-08 | Resolved: 2022-07-28

## 2022-07-28 LAB
GLUCOSE POC: 335 MG/DL
HBA1C MFR BLD HPLC: 13.2 %

## 2022-07-28 PROCEDURE — 99214 OFFICE O/P EST MOD 30 MIN: CPT | Performed by: INTERNAL MEDICINE

## 2022-07-28 PROCEDURE — 82962 GLUCOSE BLOOD TEST: CPT | Performed by: INTERNAL MEDICINE

## 2022-07-28 PROCEDURE — G8754 DIAS BP LESS 90: HCPCS | Performed by: INTERNAL MEDICINE

## 2022-07-28 PROCEDURE — 3046F HEMOGLOBIN A1C LEVEL >9.0%: CPT | Performed by: INTERNAL MEDICINE

## 2022-07-28 PROCEDURE — G8752 SYS BP LESS 140: HCPCS | Performed by: INTERNAL MEDICINE

## 2022-07-28 PROCEDURE — G9717 DOC PT DX DEP/BP F/U NT REQ: HCPCS | Performed by: INTERNAL MEDICINE

## 2022-07-28 PROCEDURE — G9899 SCRN MAM PERF RSLTS DOC: HCPCS | Performed by: INTERNAL MEDICINE

## 2022-07-28 PROCEDURE — G8427 DOCREV CUR MEDS BY ELIG CLIN: HCPCS | Performed by: INTERNAL MEDICINE

## 2022-07-28 PROCEDURE — 2022F DILAT RTA XM EVC RTNOPTHY: CPT | Performed by: INTERNAL MEDICINE

## 2022-07-28 PROCEDURE — G8417 CALC BMI ABV UP PARAM F/U: HCPCS | Performed by: INTERNAL MEDICINE

## 2022-07-28 PROCEDURE — 3017F COLORECTAL CA SCREEN DOC REV: CPT | Performed by: INTERNAL MEDICINE

## 2022-07-28 PROCEDURE — 83036 HEMOGLOBIN GLYCOSYLATED A1C: CPT | Performed by: INTERNAL MEDICINE

## 2022-07-28 RX ORDER — OMEPRAZOLE 40 MG/1
40 CAPSULE, DELAYED RELEASE ORAL DAILY
Qty: 90 CAPSULE | Refills: 1 | Status: SHIPPED | OUTPATIENT
Start: 2022-07-28

## 2022-07-28 RX ORDER — ROSUVASTATIN CALCIUM 20 MG/1
20 TABLET, COATED ORAL
COMMUNITY

## 2022-07-28 RX ORDER — TRAZODONE HYDROCHLORIDE 50 MG/1
50 TABLET ORAL
Qty: 90 TABLET | Refills: 1 | Status: SHIPPED | OUTPATIENT
Start: 2022-07-28

## 2022-07-28 RX ORDER — MONTELUKAST SODIUM 10 MG/1
10 TABLET ORAL DAILY
Qty: 90 TABLET | Refills: 1 | Status: SHIPPED | OUTPATIENT
Start: 2022-07-28

## 2022-07-28 RX ORDER — METOPROLOL SUCCINATE 25 MG/1
25 TABLET, EXTENDED RELEASE ORAL DAILY
Qty: 90 TABLET | Refills: 1 | Status: SHIPPED | OUTPATIENT
Start: 2022-07-28

## 2022-07-28 RX ORDER — INSULIN GLARGINE 100 [IU]/ML
45 INJECTION, SOLUTION SUBCUTANEOUS
Qty: 5 PEN | Refills: 3 | Status: SHIPPED | OUTPATIENT
Start: 2022-07-28

## 2022-07-28 RX ORDER — TRIAMTERENE AND HYDROCHLOROTHIAZIDE 37.5; 25 MG/1; MG/1
1 CAPSULE ORAL EVERY MORNING
Qty: 90 CAPSULE | Refills: 1 | Status: SHIPPED | OUTPATIENT
Start: 2022-07-28 | End: 2022-09-10 | Stop reason: SDUPTHER

## 2022-07-28 RX ORDER — BUDESONIDE AND FORMOTEROL FUMARATE DIHYDRATE 160; 4.5 UG/1; UG/1
2 AEROSOL RESPIRATORY (INHALATION) 2 TIMES DAILY
Qty: 1 EACH | Refills: 5 | Status: SHIPPED | OUTPATIENT
Start: 2022-07-28 | End: 2022-09-10 | Stop reason: SDUPTHER

## 2022-07-28 RX ORDER — ALBUTEROL SULFATE 90 UG/1
AEROSOL, METERED RESPIRATORY (INHALATION)
Qty: 1 EACH | Refills: 3 | Status: SHIPPED | OUTPATIENT
Start: 2022-07-28 | End: 2022-09-05 | Stop reason: SDUPTHER

## 2022-07-28 RX ORDER — PIOGLITAZONEHYDROCHLORIDE 30 MG/1
30 TABLET ORAL DAILY
COMMUNITY

## 2022-07-28 NOTE — PROGRESS NOTES
Chief Complaint   Patient presents with    ED Follow-up       Visit Vitals  /87 (BP 1 Location: Right arm, BP Patient Position: Sitting, BP Cuff Size: Large adult)   Pulse 99   Temp 97.7 °F (36.5 °C) (Oral)   Resp 16   Ht 5' 3\" (1.6 m)   Wt 187 lb (84.8 kg)   SpO2 100%   BMI 33.13 kg/m²               1. \"Have you been to the ER, urgent care clinic since your last visit? Hospitalized since your last visit? \" Yes When: 07/27 Where: [facility] Reason for visit: NAUSEA    2. \"Have you seen or consulted any other health care providers outside of the [address] [place] [address] since your last visit? \" No     3. For patients aged 39-70: Has the patient had a colonoscopy / FIT/ Cologuard? Yes - no Care Gap present      If the patient is female:    4. For patients aged 41-77: Has the patient had a mammogram within the past 2 years? Yes - Care Gap present. Most recent result on file      5. For patients aged 21-65: Has the patient had a pap smear?  No
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
## 2022-08-01 ENCOUNTER — TELEPHONE (OUTPATIENT)
Dept: INTERNAL MEDICINE CLINIC | Age: 57
End: 2022-08-01

## 2022-08-01 NOTE — TELEPHONE ENCOUNTER
Pt called states she still is not having any feeling on her left side (arm, hand, leg). Pt also states she still is not holding any food or drinks down, she is done with the nausea Rx that was giving when she went the hospital 7/28/22. Pt states she is to return to work tomorrow 8/2/22 but doesn't believe she will be able to. She is looking for a recommendation on what to do about the numbing as well as the nausea.

## 2022-08-02 LAB
ALBUMIN/CREAT UR: 39 MG/G CREAT (ref 0–29)
CHOLEST SERPL-MCNC: 168 MG/DL (ref 100–199)
CREAT UR-MCNC: 49.4 MG/DL
HCV AB S/CO SERPL IA: 0.2 S/CO RATIO (ref 0–0.9)
HDLC SERPL-MCNC: 45 MG/DL
LDLC SERPL CALC-MCNC: 101 MG/DL (ref 0–99)
MICROALBUMIN UR-MCNC: 19.5 UG/ML
TRIGL SERPL-MCNC: 122 MG/DL (ref 0–149)
TSH SERPL DL<=0.005 MIU/L-ACNC: 0.7 UIU/ML (ref 0.45–4.5)
VLDLC SERPL CALC-MCNC: 22 MG/DL (ref 5–40)

## 2022-08-03 ENCOUNTER — TELEPHONE (OUTPATIENT)
Dept: INTERNAL MEDICINE CLINIC | Age: 57
End: 2022-08-03

## 2022-08-03 NOTE — PROGRESS NOTES
Call Ms. Jolanta Michael continue current dose of statin medicine her cholesterol has improved control the blood sugar keep the blood sugar log and see the endocrinologist.    Screening for hep C negative    Thyroid function normal    She has leakage of protein in the urine due to uncontrolled diabetes she cannot take lisinopril or losartan having history of angioedema just control the blood sugar with insulin dose adjustment and with the help of endocrinologist with the help of her diet and attended diabetic education classes and follow my recommendation.

## 2022-08-03 NOTE — TELEPHONE ENCOUNTER
Called pt to notify her about labs. While speaking with pt she requested a refill on nausea Rx. I also told her about your referral to Dr Lena Guo, was that sent over already?

## 2022-08-04 ENCOUNTER — HOSPITAL ENCOUNTER (EMERGENCY)
Age: 57
Discharge: HOME | End: 2022-08-04
Attending: EMERGENCY MEDICINE
Payer: MEDICARE

## 2022-08-04 ENCOUNTER — APPOINTMENT (OUTPATIENT)
Dept: CT IMAGING | Age: 57
End: 2022-08-04
Attending: EMERGENCY MEDICINE
Payer: MEDICARE

## 2022-08-04 VITALS
TEMPERATURE: 98.3 F | DIASTOLIC BLOOD PRESSURE: 78 MMHG | WEIGHT: 198 LBS | BODY MASS INDEX: 35.08 KG/M2 | OXYGEN SATURATION: 99 % | SYSTOLIC BLOOD PRESSURE: 112 MMHG | HEART RATE: 85 BPM | HEIGHT: 63 IN | RESPIRATION RATE: 18 BRPM

## 2022-08-04 DIAGNOSIS — R10.13 ABDOMINAL PAIN, EPIGASTRIC: ICD-10-CM

## 2022-08-04 DIAGNOSIS — R11.10 VOMITING, UNSPECIFIED VOMITING TYPE, UNSPECIFIED WHETHER NAUSEA PRESENT: Primary | ICD-10-CM

## 2022-08-04 LAB
ALBUMIN SERPL-MCNC: 4.1 G/DL (ref 3.5–5)
ALBUMIN/GLOB SERPL: 0.8 {RATIO} (ref 1.1–2.2)
ALP SERPL-CCNC: 86 U/L (ref 45–117)
ALT SERPL-CCNC: 35 U/L (ref 12–78)
ANION GAP SERPL CALC-SCNC: 6 MMOL/L (ref 5–15)
AST SERPL W P-5'-P-CCNC: 28 U/L (ref 15–37)
BASOPHILS # BLD: 0 K/UL (ref 0–0.1)
BASOPHILS NFR BLD: 0 % (ref 0–1)
BILIRUB SERPL-MCNC: 0.5 MG/DL (ref 0.2–1)
BUN SERPL-MCNC: 14 MG/DL (ref 6–20)
BUN/CREAT SERPL: 11 (ref 12–20)
CA-I BLD-MCNC: 10.1 MG/DL (ref 8.5–10.1)
CHLORIDE SERPL-SCNC: 101 MMOL/L (ref 97–108)
CO2 SERPL-SCNC: 31 MMOL/L (ref 21–32)
CREAT SERPL-MCNC: 1.25 MG/DL (ref 0.55–1.02)
DIFFERENTIAL METHOD BLD: ABNORMAL
EOSINOPHIL # BLD: 0.1 K/UL (ref 0–0.4)
EOSINOPHIL NFR BLD: 1 % (ref 0–7)
ERYTHROCYTE [DISTWIDTH] IN BLOOD BY AUTOMATED COUNT: 12.6 % (ref 11.5–14.5)
GLOBULIN SER CALC-MCNC: 5.4 G/DL (ref 2–4)
GLUCOSE SERPL-MCNC: 214 MG/DL (ref 65–100)
HCT VFR BLD AUTO: 44.6 % (ref 35–47)
HGB BLD-MCNC: 14.7 G/DL (ref 11.5–16)
IMM GRANULOCYTES # BLD AUTO: 0 K/UL (ref 0–0.04)
IMM GRANULOCYTES NFR BLD AUTO: 0 % (ref 0–0.5)
LIPASE SERPL-CCNC: 229 U/L (ref 73–393)
LYMPHOCYTES # BLD: 3.5 K/UL (ref 0.8–3.5)
LYMPHOCYTES NFR BLD: 47 % (ref 12–49)
MCH RBC QN AUTO: 27.4 PG (ref 26–34)
MCHC RBC AUTO-ENTMCNC: 33 G/DL (ref 30–36.5)
MCV RBC AUTO: 83.1 FL (ref 80–99)
MONOCYTES # BLD: 0.7 K/UL (ref 0–1)
MONOCYTES NFR BLD: 9 % (ref 5–13)
NEUTS SEG # BLD: 3.2 K/UL (ref 1.8–8)
NEUTS SEG NFR BLD: 43 % (ref 32–75)
NRBC # BLD: 0 K/UL (ref 0–0.01)
NRBC BLD-RTO: 0 PER 100 WBC
PLATELET # BLD AUTO: 207 K/UL (ref 150–400)
PMV BLD AUTO: 12.6 FL (ref 8.9–12.9)
POTASSIUM SERPL-SCNC: 4.1 MMOL/L (ref 3.5–5.1)
PROT SERPL-MCNC: 9.5 G/DL (ref 6.4–8.2)
RBC # BLD AUTO: 5.37 M/UL (ref 3.8–5.2)
SODIUM SERPL-SCNC: 138 MMOL/L (ref 136–145)
WBC # BLD AUTO: 7.5 K/UL (ref 3.6–11)

## 2022-08-04 PROCEDURE — 74177 CT ABD & PELVIS W/CONTRAST: CPT

## 2022-08-04 PROCEDURE — 85025 COMPLETE CBC W/AUTO DIFF WBC: CPT

## 2022-08-04 PROCEDURE — 36415 COLL VENOUS BLD VENIPUNCTURE: CPT

## 2022-08-04 PROCEDURE — 80053 COMPREHEN METABOLIC PANEL: CPT

## 2022-08-04 PROCEDURE — 74011000636 HC RX REV CODE- 636: Performed by: EMERGENCY MEDICINE

## 2022-08-04 PROCEDURE — 96374 THER/PROPH/DIAG INJ IV PUSH: CPT

## 2022-08-04 PROCEDURE — 83690 ASSAY OF LIPASE: CPT

## 2022-08-04 PROCEDURE — 99285 EMERGENCY DEPT VISIT HI MDM: CPT

## 2022-08-04 PROCEDURE — 74011250636 HC RX REV CODE- 250/636: Performed by: EMERGENCY MEDICINE

## 2022-08-04 RX ORDER — METOCLOPRAMIDE 5 MG/1
5 TABLET ORAL
Qty: 12 TABLET | Refills: 0 | Status: SHIPPED | OUTPATIENT
Start: 2022-08-04 | End: 2022-08-14

## 2022-08-04 RX ORDER — ONDANSETRON 2 MG/ML
4 INJECTION INTRAMUSCULAR; INTRAVENOUS ONCE
Status: DISCONTINUED | OUTPATIENT
Start: 2022-08-04 | End: 2022-08-04

## 2022-08-04 RX ORDER — METOCLOPRAMIDE HYDROCHLORIDE 5 MG/ML
5 INJECTION INTRAMUSCULAR; INTRAVENOUS
Status: COMPLETED | OUTPATIENT
Start: 2022-08-04 | End: 2022-08-04

## 2022-08-04 RX ADMIN — IOPAMIDOL 100 ML: 755 INJECTION, SOLUTION INTRAVENOUS at 16:21

## 2022-08-04 RX ADMIN — SODIUM CHLORIDE 1000 ML: 9 INJECTION, SOLUTION INTRAVENOUS at 14:44

## 2022-08-04 RX ADMIN — METOCLOPRAMIDE 5 MG: 5 INJECTION, SOLUTION INTRAMUSCULAR; INTRAVENOUS at 14:43

## 2022-08-04 NOTE — ED TRIAGE NOTES
Pt reports that she has not had a BM in 3 weeks along with abd pain, headache.  Pt has been to pcp and states nothing was done

## 2022-08-04 NOTE — ED PROVIDER NOTES
EMERGENCY DEPARTMENT HISTORY AND PHYSICAL EXAM      Date: 8/4/2022  Patient Name: Noris Yeboah    History of Presenting Illness     Chief Complaint   Patient presents with    Constipation    Fussy    Headache       History Provided By: Patient    HPI: Carline Childs, 62 y.o. female with past medical history of hypertension, diabetes, COPD, and CVA, HIV presenting with 3 weeks of abdominal pain, nausea, vomiting. Reports diffuse abdominal pain. No fevers, urinary complaints, diarrhea, constipation. She has had an appendectomy and hysterectomy. Patient states she has been unable to keep down solids and liquids. Patient states she has had difficulty controlling her diabetes recently. She followed up with her primary care doctor and July 28 and there was concern clinically that she might have diabetic gastroparesis. There are no other complaints, changes, or physical findings at this time. PCP: Gianna Mg MD    No current facility-administered medications on file prior to encounter. Current Outpatient Medications on File Prior to Encounter   Medication Sig Dispense Refill    metoprolol succinate (TOPROL-XL) 25 mg XL tablet Take 1 Tablet by mouth in the morning. 90 Tablet 1    montelukast (SINGULAIR) 10 mg tablet Take 1 Tablet by mouth in the morning. 90 Tablet 1    omeprazole (PRILOSEC) 40 mg capsule Take 1 Capsule by mouth in the morning. One cap 30 minutes before eating once a food. 90 Capsule 1    empagliflozin (JARDIANCE) 25 mg tablet Take 25 mg by mouth Daily (before breakfast). pioglitazone (ACTOS) 30 mg tablet Take 30 mg by mouth in the morning. rosuvastatin (CRESTOR) 20 mg tablet Take 20 mg by mouth nightly. triamterene-hydroCHLOROthiazide (DYAZIDE) 37.5-25 mg per capsule Take 1 Capsule by mouth Every morning. 90 Capsule 1    Symbicort 160-4.5 mcg/actuation HFAA Take 2 Puffs by inhalation two (2) times a day.  1 Each 5    albuterol (PROVENTIL HFA, VENTOLIN HFA, PROAIR HFA) 90 mcg/actuation inhaler INHALE 2 PUFFS BY MOUTH EVERY 4-6 HOURS AS FOR WHEEZING 1 Each 3    traZODone (DESYREL) 50 mg tablet Take 1 Tablet by mouth nightly. 90 Tablet 1    insulin glargine (Basaglar KwikPen U-100 Insulin) 100 unit/mL (3 mL) inpn 45 Units by SubCUTAneous route nightly. 5 Pen 3    fluticasone propionate (FLONASE) 50 mcg/actuation nasal spray 2 Sprays by Both Nostrils route daily. Indications: inflammation of the nose due to an allergy 1 Each 2    cholecalciferol (Vitamin D3) 25 mcg (1,000 unit) cap Take 1 Capsule by mouth daily. 90 Capsule 1    multivit-min-FA-lycopen-lutein (Spectravite Adult 50 Plus) 0.4-300-250 mg-mcg-mcg tab Take 1 Tablet by mouth daily. 90 Tablet 1    lancets misc Check glucose 3 times a day, 90 days E11.65, Z 79.4 300 Each 2    glucose blood VI test strips (OneTouch Verio test strips) strip Check glucose 3 times a day, 90 days E11.65, Z.79.4 300 Strip 2    insulin lispro (HumaLOG KwikPen Insulin) 100 unit/mL kwikpen Inject 15 units before each meal, 90 days (Patient taking differently: 18 Units by SubCUTAneous route three (3) times daily (with meals). Inject 15 units before each meal, 90 days) 45 mL 2    insulin glargine (LANTUS,BASAGLAR) 100 unit/mL (3 mL) inpn Inject 35 units at bedtime, 90 days (Patient taking differently: Take 35 Units by SubCUTAneous route in the morning. Inject 35 units at bedtime, 90 days) 36 mL 1    Insulin Needles, Disposable, 31 gauge x 5/16\" ndle 4 times a day 200 Each 1    glucose blood VI test strips (ASCENSIA AUTODISC VI, ONE TOUCH ULTRA TEST VI) strip to check blood sugar 3 times a day before each meal and in rotation being on insulin (Patient not taking: No sig reported) 100 Strip 3    acyclovir (ZOVIRAX) 400 mg tablet Take 1 Tab by mouth two (2) times a day. Biktarvy tab tablet Take 1 Tab by mouth daily. 50mg/200 mg/25 mg per day. aspirin delayed-release 81 mg tablet Take 1 Tab by mouth daily.          Past History     Past Medical History:  Past Medical History:   Diagnosis Date    Abnormal weight gain 9/8/2020    Abnormal weight gain 9/9/2020    Anemia     Anemia     Arthritis     Asthma     Cellulitis and abscess of lower extremity 9/9/2020    Chronic back pain 12/8/2020    Chronic kidney disease     Chronic obstructive pulmonary disease (HCC)     Chronic pain     Chronic sinusitis 9/8/2020    Contact dermatitis and eczema due to cause     COPD (chronic obstructive pulmonary disease) ([facility] 9/9/2020    Depression     Depressive disorder 9/9/2020    Diabetes ([facility]     DM (diabetes mellitus), type 2 ([facility] 9/9/2020    DM (diabetes mellitus), type 2, uncontrolled 9/8/2020    Dyslipidemia 9/9/2020    Dysphagia 9/8/2020    Dysphagia 9/9/2020    Essential hypertension 9/8/2020    Essential hypertension 9/9/2020    GERD (gastroesophageal reflux disease) 9/8/2020    GERD (gastroesophageal reflux disease)     Headache     Hemorrhoids 9/8/2020    HIV (human immunodeficiency virus infection) ([facility]     Hypercholesterolemia     Hypertension     Hypokalemia 9/8/2020    Insomnia 9/8/2020    Insomnia 9/9/2020    Mononeuritis 9/8/2020    Mononeuritis 9/9/2020    Neck pain 9/9/2020    Noncompliance with treatment 9/8/2020    Noncompliance with treatment 9/9/2020    Pain in throat 9/8/2020    Pain in throat 9/9/2020    Pruritus of skin 9/8/2020    TIA (transient ischemic attack) 9/8/2020    TIA (transient ischemic attack) 9/9/2020    TIA (transient ischemic attack) 9/9/2020    Tick bite 9/8/2020    Tick bite 9/9/2020    Tobacco dependence 9/8/2020    Vitamin D deficiency 9/8/2020    Vitamin D deficiency 9/9/2020       Past Surgical History:  Past Surgical History:   Procedure Laterality Date    HX APPENDECTOMY      HX APPENDECTOMY  2012    HX COLONOSCOPY      HX COLONOSCOPY  06/25/2017    HX GI      HX GYN      HX HYSTERECTOMY      HX ORTHOPAEDIC      HX TOTAL LAPAROSCOPIC HYSTERECTOMY  2013    VASCULAR SURGERY PROCEDURE UNLIST Family History:  Family History   Problem Relation Age of Onset    Hypertension Mother     Thyroid Disease Mother     Heart Disease Father     Hypertension Father     Diabetes Father     Prostate Cancer Father     Diabetes Maternal Grandmother     Prostate Cancer Brother        Social History:  Social History     Tobacco Use    Smoking status: Former     Packs/day: 1.00     Years: 15.00     Pack years: 15.00     Types: Cigarettes     Quit date: 2019     Years since quitting: 3.5    Smokeless tobacco: Never   Vaping Use    Vaping Use: Never used   Substance Use Topics    Alcohol use: Not Currently    Drug use: Never       Allergies: Allergies   Allergen Reactions    Latex Unknown (comments)    Lisinopril Swelling     SWELLING OF THE THROAT    Lisinopril Other (comments)     PT.  States causes swelling lf the throat    Bactrim [Sulfamethoprim] Hives    Elavil Hives    Iron Nausea and Vomiting    Lyrica [Pregabalin] Hives    Adhesive Rash    Sulfamethoxazole Unknown (comments)    Trimethoprim Unknown (comments)       Review of Systems   Review of Systems   Constitutional:  Negative for chills and fever. HENT:  Negative for sore throat. Eyes:  Negative for redness. Respiratory:  Negative for shortness of breath. Cardiovascular:  Negative for chest pain. Gastrointestinal:  Positive for abdominal pain, nausea and vomiting. Genitourinary:  Negative for flank pain. Musculoskeletal:  Negative for myalgias. Skin:  Negative for rash. Neurological:  Negative for headaches. Physical Exam   Physical Exam  Vitals and nursing note reviewed. Constitutional:       General: She is not in acute distress. Appearance: Normal appearance. HENT:      Head: Normocephalic and atraumatic. Mouth/Throat:      Mouth: Mucous membranes are moist.   Eyes:      Extraocular Movements: Extraocular movements intact.       Conjunctiva/sclera: Conjunctivae normal.   Cardiovascular:      Rate and Rhythm: Normal rate and regular rhythm. Pulmonary:      Effort: Pulmonary effort is normal. No respiratory distress. Breath sounds: Normal breath sounds. No wheezing, rhonchi or rales. Abdominal:      General: There is no distension. Palpations: Abdomen is soft. Tenderness: There is no abdominal tenderness. Musculoskeletal:         General: Normal range of motion. Cervical back: Normal range of motion. Skin:     General: Skin is warm and dry. Neurological:      General: No focal deficit present. Mental Status: She is alert and oriented to person, place, and time. Mental status is at baseline. Lab and Diagnostic Study Results   Labs -     Recent Results (from the past 12 hour(s))   CBC WITH AUTOMATED DIFF    Collection Time: 08/04/22  3:00 PM   Result Value Ref Range    WBC 7.5 3.6 - 11.0 K/uL    RBC 5.37 (H) 3.80 - 5.20 M/uL    HGB 14.7 11.5 - 16.0 g/dL    HCT 44.6 35.0 - 47.0 %    MCV 83.1 80.0 - 99.0 FL    MCH 27.4 26.0 - 34.0 PG    MCHC 33.0 30.0 - 36.5 g/dL    RDW 12.6 11.5 - 14.5 %    PLATELET 404 640 - 930 K/uL    MPV 12.6 8.9 - 12.9 FL    NRBC 0.0 0.0  WBC    ABSOLUTE NRBC 0.00 0.00 - 0.01 K/uL    NEUTROPHILS 43 32 - 75 %    LYMPHOCYTES 47 12 - 49 %    MONOCYTES 9 5 - 13 %    EOSINOPHILS 1 0 - 7 %    BASOPHILS 0 0 - 1 %    IMMATURE GRANULOCYTES 0 0 - 0.5 %    ABS. NEUTROPHILS 3.2 1.8 - 8.0 K/UL    ABS. LYMPHOCYTES 3.5 0.8 - 3.5 K/UL    ABS. MONOCYTES 0.7 0.0 - 1.0 K/UL    ABS. EOSINOPHILS 0.1 0.0 - 0.4 K/UL    ABS. BASOPHILS 0.0 0.0 - 0.1 K/UL    ABS. IMM.  GRANS. 0.0 0.00 - 0.04 K/UL    DF AUTOMATED     METABOLIC PANEL, COMPREHENSIVE    Collection Time: 08/04/22  3:00 PM   Result Value Ref Range    Sodium 138 136 - 145 mmol/L    Potassium 4.1 3.5 - 5.1 mmol/L    Chloride 101 97 - 108 mmol/L    CO2 31 21 - 32 mmol/L    Anion gap 6 5 - 15 mmol/L    Glucose 214 (H) 65 - 100 mg/dL    BUN 14 6 - 20 mg/dL    Creatinine 1.25 (H) 0.55 - 1.02 mg/dL    BUN/Creatinine ratio 11 (L) 12 - 20      GFR est AA 54 (L) >60 ml/min/1.73m2    GFR est non-AA 44 (L) >60 ml/min/1.73m2    Calcium 10.1 8.5 - 10.1 mg/dL    Bilirubin, total 0.5 0.2 - 1.0 mg/dL    AST (SGOT) 28 15 - 37 U/L    ALT (SGPT) 35 12 - 78 U/L    Alk. phosphatase 86 45 - 117 U/L    Protein, total 9.5 (H) 6.4 - 8.2 g/dL    Albumin 4.1 3.5 - 5.0 g/dL    Globulin 5.4 (H) 2.0 - 4.0 g/dL    A-G Ratio 0.8 (L) 1.1 - 2.2     LIPASE    Collection Time: 08/04/22  3:00 PM   Result Value Ref Range    Lipase 229 73 - 393 U/L       Radiologic Studies -   @[email or web address]@  CT Results  (Last 48 hours)                 08/04/22 1619  CT ABD PELV W CONT Final result    Impression:      1. No acute abnormality       Narrative:  EXAM: CT ABD PELV W CONT       INDICATION: abd pain, vomiting       COMPARISON: 12/22/2021        CONTRAST: 100 mL of Isovue-370. ORAL CONTRAST: None       TECHNIQUE:    Following the uneventful intravenous administration of contrast, thin axial   images were obtained through the abdomen and pelvis. Coronal and sagittal   reconstructions were generated. CT dose reduction was achieved through use of a   standardized protocol tailored for this examination and automatic exposure   control for dose modulation. FINDINGS:    LOWER THORAX: Basilar atelectasis   LIVER: 16 mm mass segment 7 of the liver. There is peripheral nodular   enhancement. Delayed images were not obtained at this most likely represents a   hemangioma   BILIARY TREE: Gallbladder is within normal limits. CBD is not dilated. SPLEEN: within normal limits. PANCREAS: No mass or ductal dilatation. ADRENALS: Unremarkable. KIDNEYS: No mass, calculus, or hydronephrosis. STOMACH: Unremarkable. SMALL BOWEL: No dilatation or wall thickening. COLON: No dilatation or wall thickening. APPENDIX: Surgically absent   PERITONEUM: No ascites or pneumoperitoneum. RETROPERITONEUM: No lymphadenopathy or aortic aneurysm.    REPRODUCTIVE ORGANS: Surgically absent URINARY BLADDER: No mass or calculus. BONES: No destructive bone lesion. ABDOMINAL WALL: No mass or hernia. ADDITIONAL COMMENTS: N/A                 CXR Results  (Last 48 hours)      None            Medical Decision Making and ED Course   - I am the first provider for this patient. I reviewed the vital signs, available nursing notes, past medical history, past surgical history, family history and social history. - Initial assessment performed. The patients presenting problems have been discussed, and they are in agreement with the care plan formulated and outlined with them. I have encouraged them to ask questions as they arise throughout their visit. Vital Signs-Reviewed the patient's vital signs. Patient Vitals for the past 12 hrs:   Temp Pulse Resp BP SpO2   08/04/22 1259 98.3 °F (36.8 °C) 85 18 112/78 99 %       Differential Diagnosis & Medical Decision Making Provider Note:   Armin Brown, 62 y.o. female with past medical history of hypertension, diabetes, COPD, and CVA, HIV presenting with 3 weeks of abdominal pain, nausea, vomiting. No abdominal tenderness on exam.  Labs reassuring with no ADONIS, leukocytosis. Given comorbidities, CT obtained which was negative for acute intra-abdominal process. She does have high risk for gastroparesis given her poorly controlled diabetes. Will trial start patient on reglan. Recdommend follow up with her gi  MDM       ED Course:            Disposition   Disposition: DC- Adult Discharges: All of the diagnostic tests were reviewed and questions answered. Diagnosis, care plan and treatment options were discussed. The patient understands the instructions and will follow up as directed. The patients results have been reviewed with them. They have been counseled regarding their diagnosis.   The patient verbally convey understanding and agreement of the signs, symptoms, diagnosis, treatment and prognosis and additionally agrees to follow up as recommended with their PCP in 24 - 48 hours. They also agree with the care-plan and convey that all of their questions have been answered. I have also put together some discharge instructions for them that include: 1) educational information regarding their diagnosis, 2) how to care for their diagnosis at home, as well a 3) list of reasons why they would want to return to the ED prior to their follow-up appointment, should their condition change. Discharged    DISCHARGE PLAN:  1. Current Discharge Medication List        CONTINUE these medications which have NOT CHANGED    Details   metoprolol succinate (TOPROL-XL) 25 mg XL tablet Take 1 Tablet by mouth in the morning. Qty: 90 Tablet, Refills: 1    Associated Diagnoses: Essential (primary) hypertension      montelukast (SINGULAIR) 10 mg tablet Take 1 Tablet by mouth in the morning. Qty: 90 Tablet, Refills: 1      omeprazole (PRILOSEC) 40 mg capsule Take 1 Capsule by mouth in the morning. One cap 30 minutes before eating once a food. Qty: 90 Capsule, Refills: 1      empagliflozin (JARDIANCE) 25 mg tablet Take 25 mg by mouth Daily (before breakfast). pioglitazone (ACTOS) 30 mg tablet Take 30 mg by mouth in the morning. rosuvastatin (CRESTOR) 20 mg tablet Take 20 mg by mouth nightly. triamterene-hydroCHLOROthiazide (DYAZIDE) 37.5-25 mg per capsule Take 1 Capsule by mouth Every morning. Qty: 90 Capsule, Refills: 1      Symbicort 160-4.5 mcg/actuation HFAA Take 2 Puffs by inhalation two (2) times a day. Qty: 1 Each, Refills: 5      albuterol (PROVENTIL HFA, VENTOLIN HFA, PROAIR HFA) 90 mcg/actuation inhaler INHALE 2 PUFFS BY MOUTH EVERY 4-6 HOURS AS FOR WHEEZING  Qty: 1 Each, Refills: 3    Associated Diagnoses: Chronic obstructive pulmonary disease, unspecified COPD type (HCC)      traZODone (DESYREL) 50 mg tablet Take 1 Tablet by mouth nightly.   Qty: 90 Tablet, Refills: 1      !! insulin glargine (Basaglar KwikPen U-100 Insulin) 100 unit/mL (3 mL) inpn 45 Units by SubCUTAneous route nightly. Qty: 5 Pen, Refills: 3      fluticasone propionate (FLONASE) 50 mcg/actuation nasal spray 2 Sprays by Both Nostrils route daily. Indications: inflammation of the nose due to an allergy  Qty: 1 Each, Refills: 2    Associated Diagnoses: Hx of seasonal allergies      cholecalciferol (Vitamin D3) 25 mcg (1,000 unit) cap Take 1 Capsule by mouth daily. Qty: 90 Capsule, Refills: 1    Associated Diagnoses: Vitamin D deficiency      multivit-min-FA-lycopen-lutein (Spectravite Adult 50 Plus) 0.4-300-250 mg-mcg-mcg tab Take 1 Tablet by mouth daily. Qty: 90 Tablet, Refills: 1    Associated Diagnoses: Human immunodeficiency virus (HIV) disease ([facility]      lancets misc Check glucose 3 times a day, 90 days E11.65, Z 79.4  Qty: 300 Each, Refills: 2    Comments: Patient is on insulin  Associated Diagnoses: Type 2 diabetes mellitus with hyperglycemia, with long-term current use of insulin ([facility]      ! ! glucose blood VI test strips (OneTouch Verio test strips) strip Check glucose 3 times a day, 90 days E11.65, Z.79.4  Qty: 300 Strip, Refills: 2    Comments: Patient is on insulin. Medicare should cover to check 3 times a day. Associated Diagnoses: Type 2 diabetes mellitus with hyperglycemia, with long-term current use of insulin ([facility] Encounter for long-term (current) use of insulin ([facility])      insulin lispro (HumaLOG KwikPen Insulin) 100 unit/mL kwikpen Inject 15 units before each meal, 90 days  Qty: 45 mL, Refills: 2    Associated Diagnoses: Type 2 diabetes mellitus with hyperglycemia, with long-term current use of insulin ([facility]      ! ! insulin glargine (LANTUS,BASAGLAR) 100 unit/mL (3 mL) inpn Inject 35 units at bedtime, 90 days  Qty: 36 mL, Refills: 1    Associated Diagnoses: Type 2 diabetes mellitus with hyperglycemia, with long-term current use of insulin ([facility])      Insulin Needles, Disposable, 31 gauge x 5/16\" ndle 4 times a day  Qty: 200 Each, Refills: 1    Associated Diagnoses: Type 2 diabetes mellitus with hyperglycemia, with long-term current use of insulin ([address]      ! ! glucose blood VI test strips (ASCENSIA AUTODISC VI, ONE TOUCH ULTRA TEST VI) strip to check blood sugar 3 times a day before each meal and in rotation being on insulin  Qty: 100 Strip, Refills: 3      acyclovir (ZOVIRAX) 400 mg tablet Take 1 Tab by mouth two (2) times a day. Biktarvy tab tablet Take 1 Tab by mouth daily. 50mg/200 mg/25 mg per day. aspirin delayed-release 81 mg tablet Take 1 Tab by mouth daily. !! - Potential duplicate medications found. Please discuss with provider. 2.   Follow-up Information       Follow up With Specialties Details Why Contact Info    Elio Ferreira MD Internal Medicine Physician   [facility],[address], [facility]  [phone]            3. Return to ED if worse   4. Discharge Medication List as of 8/4/2022  5:28 PM            Diagnosis/Clinical Impression     Clinical Impression:   1. Vomiting, unspecified vomiting type, unspecified whether nausea present    2. Abdominal pain, epigastric        Attestations: Alexi Simeon MD, am the primary clinician of record. Please note that this dictation was completed with R[employer]pan, the computer voice recognition software. Quite often unanticipated grammatical, syntax, homophones, and other interpretive errors are inadvertently transcribed by the computer software. Please disregard these errors. Please excuse any errors that have escaped final proofreading. Thank you.

## 2022-08-29 ENCOUNTER — TELEPHONE (OUTPATIENT)
Dept: INTERNAL MEDICINE CLINIC | Age: 57
End: 2022-08-29

## 2022-08-29 RX ORDER — SERTRALINE HYDROCHLORIDE 50 MG/1
50 TABLET, FILM COATED ORAL DAILY
Qty: 30 TABLET | Refills: 5 | Status: SHIPPED | OUTPATIENT
Start: 2022-08-29

## 2022-08-29 NOTE — TELEPHONE ENCOUNTER
[facility] faxed refill request for the following medication:      Sertraline HCL 50 MG Tablet  QTY: 90  Take 1 tablet by mouth every day          LOV   7-  NOV 11- letter sent

## 2022-09-02 ENCOUNTER — TELEPHONE (OUTPATIENT)
Dept: INTERNAL MEDICINE CLINIC | Age: 57
End: 2022-09-02

## 2022-09-02 DIAGNOSIS — J44.9 CHRONIC OBSTRUCTIVE PULMONARY DISEASE, UNSPECIFIED COPD TYPE (HCC): ICD-10-CM

## 2022-09-02 NOTE — TELEPHONE ENCOUNTER
[facility] Faxed - Response Requested Alternative Request    Proair HFA 90 MCG inhaler  Sig:  inhall 2 puffs by mouth every 4 - 6 hours as needed for wheezing    This medication is not covered

## 2022-09-05 RX ORDER — ALBUTEROL SULFATE 90 UG/1
AEROSOL, METERED RESPIRATORY (INHALATION)
Qty: 1 EACH | Refills: 5 | Status: SHIPPED | OUTPATIENT
Start: 2022-09-05

## 2022-09-07 ENCOUNTER — TELEPHONE (OUTPATIENT)
Dept: INTERNAL MEDICINE CLINIC | Age: 57
End: 2022-09-07

## 2022-09-07 NOTE — TELEPHONE ENCOUNTER
[facility] faxed Alternative Requested for the following medication:        ProAir HFA 90 MCG Inhaler  QTY: 8.5  Inhale 2 puffs by mouth every 4-6 hours as for wheezing      Pharmacy Comments:     Alternative Requested : Not Covered

## 2022-09-09 ENCOUNTER — TELEPHONE (OUTPATIENT)
Dept: INTERNAL MEDICINE CLINIC | Age: 57
End: 2022-09-09

## 2022-09-09 DIAGNOSIS — E11.65 UNCONTROLLED TYPE 2 DIABETES MELLITUS WITH HYPERGLYCEMIA (HCC): Primary | ICD-10-CM

## 2022-09-09 NOTE — TELEPHONE ENCOUNTER
Patient came by office requesting referral to Salena Westbrook at [facility] at [facility]. Since Dr. Luna Scruggs left this is the doctor she want to now see to treat her for her diabetes. Can you please put referral in so she can get an appointment schedule as soon as possible.

## 2022-09-09 NOTE — TELEPHONE ENCOUNTER
Patient came by office requesting refill for the following medications:    triamterene-hydroCHLOROthiazide (DYAZIDE) 37.5-25 mg per capsule 90 Capsule 1 7/28/2022     Sig - Route: Take 1 Capsule by mouth Every morning. - Oral        Symbicort 160-4.5 mcg/actuation HFAA 1 Each 5 7/28/2022     Sig - Route: Take 2 Puffs by inhalation two (2) times a day. - Inhalation        ondansetron (ZOFRAN ODT) 4 mg disintegrating tablet 12 Tablet 0 7/27/2022 7/31/2022    Sig - Route:  Take 1 Tablet by mouth every eight (8) hours as needed for Nausea or Vomiting for up to 4 days. - Oral        LOV   7-  NOV 11-

## 2022-09-10 RX ORDER — BUDESONIDE AND FORMOTEROL FUMARATE DIHYDRATE 160; 4.5 UG/1; UG/1
2 AEROSOL RESPIRATORY (INHALATION) 2 TIMES DAILY
Qty: 1 EACH | Refills: 5 | Status: SHIPPED | OUTPATIENT
Start: 2022-09-10

## 2022-09-10 RX ORDER — TRIAMTERENE AND HYDROCHLOROTHIAZIDE 37.5; 25 MG/1; MG/1
1 CAPSULE ORAL EVERY MORNING
Qty: 90 CAPSULE | Refills: 1 | Status: SHIPPED | OUTPATIENT
Start: 2022-09-10

## 2022-09-12 ENCOUNTER — TELEPHONE (OUTPATIENT)
Dept: INTERNAL MEDICINE CLINIC | Age: 57
End: 2022-09-12

## 2022-09-12 RX ORDER — ONDANSETRON 4 MG/1
4 TABLET, ORALLY DISINTEGRATING ORAL
Qty: 15 TABLET | Refills: 0 | Status: SHIPPED | OUTPATIENT
Start: 2022-09-12

## 2022-09-12 NOTE — TELEPHONE ENCOUNTER
Patient requesting a refill for the following medication:    ondansetron (ZOFRAN ODT) 4 mg disintegrating tablet 12 Tablet 0 7/27/2022 7/31/2022      Sig - Route:  Take 1 Tablet by mouth every eight (8) hours as needed for Nausea or Vomiting for up to 4 days. - Oral           LOV       7-  NOV    11-

## 2022-11-12 PROBLEM — G89.29 CHRONIC BACK PAIN: Status: RESOLVED | Noted: 2020-09-08 | Resolved: 2022-11-12

## 2022-11-12 PROBLEM — I10 ESSENTIAL HYPERTENSION: Status: RESOLVED | Noted: 2020-09-08 | Resolved: 2022-11-12

## 2022-11-12 PROBLEM — K42.9 UMBILICAL HERNIA: Status: RESOLVED | Noted: 2020-12-08 | Resolved: 2022-11-12

## 2022-11-12 PROBLEM — G58.9 MONONEURITIS: Status: RESOLVED | Noted: 2020-09-08 | Resolved: 2022-11-12

## 2022-11-12 PROBLEM — K35.80 ACUTE APPENDICITIS: Status: RESOLVED | Noted: 2020-12-08 | Resolved: 2022-11-12

## 2022-11-12 PROBLEM — R10.9 ABDOMINAL PAIN: Status: RESOLVED | Noted: 2020-12-08 | Resolved: 2022-11-12

## 2022-11-12 PROBLEM — G45.9 TIA (TRANSIENT ISCHEMIC ATTACK): Status: RESOLVED | Noted: 2020-09-08 | Resolved: 2022-11-12

## 2022-11-12 PROBLEM — K64.9 HEMORRHOIDS: Status: RESOLVED | Noted: 2020-09-08 | Resolved: 2022-11-12
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
## 2022-11-12 PROBLEM — R07.0 PAIN IN THROAT: Status: RESOLVED | Noted: 2020-09-09 | Resolved: 2022-11-12

## 2022-11-12 PROBLEM — R11.0 NAUSEA: Status: RESOLVED | Noted: 2020-12-08 | Resolved: 2022-11-12

## 2022-11-12 PROBLEM — R68.83 CHILL: Status: RESOLVED | Noted: 2020-12-08 | Resolved: 2022-11-12

## 2022-11-12 PROBLEM — L03.90 CELLULITIS: Status: RESOLVED | Noted: 2020-12-08 | Resolved: 2022-11-12

## 2022-11-12 PROBLEM — L03.119 CELLULITIS OF LOWER LIMB: Status: RESOLVED | Noted: 2020-09-08 | Resolved: 2022-11-12

## 2022-11-12 PROBLEM — L02.419 CELLULITIS AND ABSCESS OF LOWER EXTREMITY: Status: RESOLVED | Noted: 2020-09-09 | Resolved: 2022-11-12

## 2022-11-12 PROBLEM — L29.9 PRURITUS OF SKIN: Status: RESOLVED | Noted: 2020-09-08 | Resolved: 2022-11-12

## 2022-11-12 PROBLEM — R13.10 SWALLOWING PAINFUL: Status: RESOLVED | Noted: 2020-12-08 | Resolved: 2022-11-12

## 2022-11-12 PROBLEM — E78.5 DYSLIPIDEMIA: Status: RESOLVED | Noted: 2020-09-09 | Resolved: 2022-11-12

## 2022-11-12 PROBLEM — Z91.199 NONCOMPLIANCE: Status: RESOLVED | Noted: 2021-08-25 | Resolved: 2022-11-12

## 2022-11-12 PROBLEM — Z91.199 NONCOMPLIANCE WITH TREATMENT: Status: RESOLVED | Noted: 2020-09-08 | Resolved: 2022-11-12

## 2022-11-12 PROBLEM — G47.00 INSOMNIA: Status: RESOLVED | Noted: 2020-09-08 | Resolved: 2022-11-12

## 2022-11-12 PROBLEM — E11.00 TYPE 2 DIABETES MELLITUS WITH HYPEROSMOLAR NONKETOTIC HYPERGLYCEMIA (HCC): Status: RESOLVED | Noted: 2020-10-04 | Resolved: 2022-11-12

## 2022-11-12 PROBLEM — M54.9 CHRONIC BACK PAIN: Status: RESOLVED | Noted: 2020-12-08 | Resolved: 2022-11-12

## 2022-11-12 PROBLEM — G89.29 CHRONIC BACK PAIN: Status: RESOLVED | Noted: 2020-12-08 | Resolved: 2022-11-12

## 2022-11-12 PROBLEM — R07.0 PAIN IN THROAT: Status: RESOLVED | Noted: 2020-09-08 | Resolved: 2022-11-12

## 2022-11-12 PROBLEM — F17.210 CIGARETTE SMOKER: Status: RESOLVED | Noted: 2020-12-08 | Resolved: 2022-11-12

## 2022-11-12 PROBLEM — R13.10 DYSPHAGIA: Status: RESOLVED | Noted: 2020-09-08 | Resolved: 2022-11-12

## 2022-11-12 PROBLEM — K64.9 HEMORRHOIDS: Status: RESOLVED | Noted: 2020-12-08 | Resolved: 2022-11-12

## 2022-11-12 PROBLEM — L03.119 CELLULITIS AND ABSCESS OF LOWER EXTREMITY: Status: RESOLVED | Noted: 2020-09-09 | Resolved: 2022-11-12

## 2022-11-12 PROBLEM — R53.82 CHRONIC FATIGUE: Status: RESOLVED | Noted: 2022-04-22 | Resolved: 2022-11-12

## 2022-11-12 PROBLEM — F32.A DEPRESSIVE DISORDER: Status: RESOLVED | Noted: 2020-09-09 | Resolved: 2022-11-12

## 2022-11-12 PROBLEM — R51.9 HEADACHE: Status: RESOLVED | Noted: 2020-12-08 | Resolved: 2022-11-12

## 2022-11-12 PROBLEM — I10 ESSENTIAL (PRIMARY) HYPERTENSION: Status: RESOLVED | Noted: 2022-04-21 | Resolved: 2022-11-12

## 2022-11-12 PROBLEM — G58.9 MONONEURITIS: Status: RESOLVED | Noted: 2020-09-09 | Resolved: 2022-11-12

## 2022-11-12 PROBLEM — M54.9 CHRONIC BACK PAIN: Status: RESOLVED | Noted: 2020-09-08 | Resolved: 2022-11-12

## 2022-11-12 PROBLEM — B37.0 THRUSH: Status: RESOLVED | Noted: 2020-09-10 | Resolved: 2022-11-12

## 2022-11-15 ENCOUNTER — OFFICE VISIT (OUTPATIENT)
Dept: INTERNAL MEDICINE CLINIC | Age: 57
End: 2022-11-15
Payer: MEDICARE

## 2022-11-15 VITALS
SYSTOLIC BLOOD PRESSURE: 118 MMHG | TEMPERATURE: 97.1 F | BODY MASS INDEX: 33.66 KG/M2 | HEART RATE: 84 BPM | WEIGHT: 190 LBS | HEIGHT: 63 IN | RESPIRATION RATE: 17 BRPM | OXYGEN SATURATION: 97 % | DIASTOLIC BLOOD PRESSURE: 88 MMHG

## 2022-11-15 DIAGNOSIS — Z79.4 TYPE 2 DIABETES MELLITUS WITH HYPERGLYCEMIA, WITH LONG-TERM CURRENT USE OF INSULIN (HCC): ICD-10-CM

## 2022-11-15 DIAGNOSIS — Z87.891 EX-SMOKER: ICD-10-CM

## 2022-11-15 DIAGNOSIS — F32.A DEPRESSIVE DISORDER: ICD-10-CM

## 2022-11-15 DIAGNOSIS — K21.9 GASTROESOPHAGEAL REFLUX DISEASE WITHOUT ESOPHAGITIS: ICD-10-CM

## 2022-11-15 DIAGNOSIS — J44.9 CHRONIC OBSTRUCTIVE PULMONARY DISEASE, UNSPECIFIED COPD TYPE (HCC): ICD-10-CM

## 2022-11-15 DIAGNOSIS — G47.30 SLEEP APNEA, UNSPECIFIED TYPE: ICD-10-CM

## 2022-11-15 DIAGNOSIS — E78.2 MIXED HYPERLIPIDEMIA: ICD-10-CM

## 2022-11-15 DIAGNOSIS — Z79.4 LONG TERM (CURRENT) USE OF INSULIN (HCC): ICD-10-CM

## 2022-11-15 DIAGNOSIS — I10 ESSENTIAL (PRIMARY) HYPERTENSION: Primary | ICD-10-CM

## 2022-11-15 DIAGNOSIS — B20 HUMAN IMMUNODEFICIENCY VIRUS (HIV) DISEASE (HCC): ICD-10-CM

## 2022-11-15 DIAGNOSIS — E11.65 UNCONTROLLED TYPE 2 DIABETES MELLITUS WITH HYPERGLYCEMIA (HCC): ICD-10-CM

## 2022-11-15 DIAGNOSIS — E11.65 TYPE 2 DIABETES MELLITUS WITH HYPERGLYCEMIA, WITH LONG-TERM CURRENT USE OF INSULIN (HCC): ICD-10-CM

## 2022-11-15 DIAGNOSIS — Z91.199 NONCOMPLIANCE WITH TREATMENT: ICD-10-CM

## 2022-11-15 DIAGNOSIS — Z86.73 HISTORY OF TIA (TRANSIENT ISCHEMIC ATTACK): ICD-10-CM

## 2022-11-15 PROBLEM — Z21 HIV INFECTION, UNSPECIFIED SYMPTOM STATUS (HCC): Status: RESOLVED | Noted: 2020-10-09 | Resolved: 2022-11-15

## 2022-11-15 PROCEDURE — G8752 SYS BP LESS 140: HCPCS | Performed by: INTERNAL MEDICINE

## 2022-11-15 PROCEDURE — G9717 DOC PT DX DEP/BP F/U NT REQ: HCPCS | Performed by: INTERNAL MEDICINE

## 2022-11-15 PROCEDURE — 2022F DILAT RTA XM EVC RTNOPTHY: CPT | Performed by: INTERNAL MEDICINE

## 2022-11-15 PROCEDURE — 3046F HEMOGLOBIN A1C LEVEL >9.0%: CPT | Performed by: INTERNAL MEDICINE

## 2022-11-15 PROCEDURE — 3017F COLORECTAL CA SCREEN DOC REV: CPT | Performed by: INTERNAL MEDICINE

## 2022-11-15 PROCEDURE — G8417 CALC BMI ABV UP PARAM F/U: HCPCS | Performed by: INTERNAL MEDICINE

## 2022-11-15 PROCEDURE — G8427 DOCREV CUR MEDS BY ELIG CLIN: HCPCS | Performed by: INTERNAL MEDICINE

## 2022-11-15 PROCEDURE — 99214 OFFICE O/P EST MOD 30 MIN: CPT | Performed by: INTERNAL MEDICINE

## 2022-11-15 PROCEDURE — G9899 SCRN MAM PERF RSLTS DOC: HCPCS | Performed by: INTERNAL MEDICINE

## 2022-11-15 PROCEDURE — G8754 DIAS BP LESS 90: HCPCS | Performed by: INTERNAL MEDICINE

## 2022-11-15 RX ORDER — SERTRALINE HYDROCHLORIDE 50 MG/1
50 TABLET, FILM COATED ORAL DAILY
Qty: 90 TABLET | Refills: 1 | Status: SHIPPED | OUTPATIENT
Start: 2022-11-15

## 2022-11-15 RX ORDER — ATORVASTATIN CALCIUM 20 MG/1
20 TABLET, FILM COATED ORAL DAILY
Qty: 90 TABLET | Refills: 1 | Status: SHIPPED | OUTPATIENT
Start: 2022-11-15

## 2022-11-15 RX ORDER — INSULIN LISPRO 100 [IU]/ML
20 INJECTION, SOLUTION INTRAVENOUS; SUBCUTANEOUS
Qty: 5 PEN | Refills: 0 | Status: SHIPPED | OUTPATIENT
Start: 2022-11-15

## 2022-11-15 RX ORDER — PEN NEEDLE, DIABETIC 30 GX3/16"
NEEDLE, DISPOSABLE MISCELLANEOUS
Qty: 100 EACH | Refills: 3 | Status: SHIPPED | OUTPATIENT
Start: 2022-11-15

## 2022-11-15 RX ORDER — UREA 10 %
1 LOTION (ML) TOPICAL DAILY
Qty: 90 TABLET | Refills: 1 | Status: SHIPPED | OUTPATIENT
Start: 2022-11-15

## 2022-11-15 RX ORDER — BISMUTH SUBSALICYLATE 262 MG
1 TABLET,CHEWABLE ORAL DAILY
Qty: 90 TABLET | Refills: 1 | Status: SHIPPED | OUTPATIENT
Start: 2022-11-15

## 2022-11-15 RX ORDER — SUCRALFATE 1 G/1
1 TABLET ORAL 4 TIMES DAILY
COMMUNITY

## 2022-11-15 RX ORDER — PANTOPRAZOLE SODIUM 40 MG/1
40 TABLET, DELAYED RELEASE ORAL DAILY
COMMUNITY

## 2022-11-15 RX ORDER — PIOGLITAZONEHYDROCHLORIDE 30 MG/1
30 TABLET ORAL
Qty: 90 TABLET | Refills: 0 | Status: SHIPPED | OUTPATIENT
Start: 2022-11-15

## 2022-11-15 RX ORDER — INSULIN GLARGINE 100 [IU]/ML
50 INJECTION, SOLUTION SUBCUTANEOUS
Qty: 5 PEN | Refills: 0 | Status: SHIPPED | OUTPATIENT
Start: 2022-11-15

## 2022-11-15 NOTE — PROGRESS NOTES
Ramon Mccarty (: 1965) is a 62 y.o. female, established patient, here for evaluation of the following chief complaint(s):  Follow Up Chronic Condition, Cholesterol Problem, Hypertension, and Diabetes ([address])         ASSESSMENT/PLAN:  Below is the assessment and plan developed based on review of pertinent history, physical exam, labs, studies, and medications. 1. Essential (primary) hypertension  2. Uncontrolled type 2 diabetes mellitus with hyperglycemia ([address]  3. Chronic obstructive pulmonary disease, unspecified COPD type ([address]  4. Mixed hyperlipidemia  5. Depressive disorder  6. Gastroesophageal reflux disease without esophagitis  7. Long term (current) use of insulin ([facility]  8. Human immunodeficiency virus (HIV) disease ([facility]  9. Type 2 diabetes mellitus with hyperglycemia, with long-term current use of insulin (HCC)  -     insulin lispro (HumaLOG KwikPen Insulin) 100 unit/mL kwikpen; 20 Units by SubCUTAneous route three (3) times daily (with meals). , Normal, Disp-5 Pen, R-0  10. Ex-smoker  11. History of TIA (transient ischemic attack)  12. Noncompliance with treatment  13. Sleep apnea, unspecified type    Ms. Buster Villaseñor has multiple comorbidities with uncontrolled diabetes mellitus and insulin-dependent. Hypertension controlled she cannot take ACE or ARB having allergic reaction. Continued,    Metoprolol ER 25 mg once a day, triamterene hydrochlorothiazide 37.5/25 mg once a day in the morning. Diet low in sodium. Type 2 diabetes mellitus uncontrolled.,    Hemoglobin A1c 9.1% on . She says her fasting blood sugar remains sometimes 200 or 300 she is a very poor historian. Random blood sugar also remains around 300 I do not know why it is remaining high if she is getting insulin with compliance. And not sure about compliance advised. I increased Lantus 50 units at bedtime. And lispro 20 units with each meal.    Today again she forgot her blood sugar log.   Somehow she is not taking pioglitazone that I started again she is overall noncompliant and not serious about the medications. Started back Actos 30 mg once a day and Jardiance increased to 25 mg once a day before breakfast she has upcoming appointment with endocrinologist on Friday until that time I made following changes and I gave all the refills including for pen needles. She cannot take metformin causing GI upset. She cannot take glipizide. Diabetic diet. She should attend diabetic education classes continue low-dose aspirin continue statin in between she had stopped rosuvastatin started on atorvastatin. Regular ophthalmologic checkup. History of nausea vomiting and GERD getting sucralfate and pantoprazole given by Dr. Symone Doshi she could not take rosuvastatin due to pharmacy told her not to take started back on atorvastatin low-dose 10 mg once a day. Situational depression she said trazodone is not working at all started back on sertraline 50 mg once a day. Asthma getting rescue and maintenance inhaler she says she follows pulmonologist Dr. Lia Hallman Dr. But she could not tell me the name of the pulmonologist.    HIV infection follows infectious disease specialist at [facility] [place].  I reviewed the notes. She is given acyclovir, Biktarvy. Started on multivitamin and she should take vitamin D3 and calcium follow-up in 3 months answered all her questions refills given. I will not order labs because she has done her all labs recently in October. Return in about 3 months (around 2/15/2023) for diabetes, hypertension,cholesterol follow up. SUBJECTIVE/OBJECTIVE:    HPI:      Ms. Fred Yeh came for regular follow-up. She has multiple comorbidities. Her blood pressure fortunately is controlled. She brought all her medicines. She is overall noncompliant. Today also again she forgot her blood sugar log and glucometer machine.     She has upcoming appointment with endocrinologist on coming Friday. She says her fasting blood sugar remains in twos and threes and random blood sugar also remains more than 300 however it is very difficult to understand for the reliability because she is already on Lantus and Humalog. I increase Lantus from 45 to 50 units at bedtime and Humalog from 15 to 20 units at bedtime. I did not see pioglitazone in her back so started back. She is on Jardiance. She cannot take ACE or ARB due to history of allergic reaction. She says trazodone is not helping and she wants to go back on sertraline. In the interval time she has consulted gastroenterologist for nausea vomiting was started on pantoprazole and sucralfate. I am not sure she might have diabetic gastroparesis but currently no nausea vomiting    She started working from home. She has asthma she follows pulmonologist  but could not tell me the name. She is ex-smoker she quit smoking cigarette in 2019. She says she is not getting multivitamin and vitamin D and calcium she expects everything by prescription. No negative thoughts. She cannot take metformin due to GI upset. Review of Systems   Constitutional: Negative. HENT:  Negative for congestion and sore throat. Eyes:  Negative for blurred vision. Respiratory:  Negative for cough, shortness of breath and wheezing. Cardiovascular: Negative. Gastrointestinal: Negative. Genitourinary: Negative. Musculoskeletal:  Negative for back pain, joint pain and myalgias. Neurological:  Negative for dizziness and tingling. Psychiatric/Behavioral: Negative. Vitals:    11/15/22 1114 11/15/22 1140   BP: 126/88 118/88   Pulse: 99 84   Resp: 17    Temp: 97.1 °F (36.2 °C)    TempSrc: Temporal    SpO2: 97%    Weight: 190 lb (86.2 kg)    Height: 5' 3\" (1.6 m)       Physical Exam  Vitals and nursing note reviewed. Constitutional:       General: She is not in acute distress. Appearance: Normal appearance. She is obese.  She is not toxic-appearing. HENT:      Mouth/Throat:      Mouth: Mucous membranes are moist.   Eyes:      Pupils: Pupils are equal, round, and reactive to light. Cardiovascular:      Rate and Rhythm: Normal rate and regular rhythm. Pulses: Normal pulses. Heart sounds: Normal heart sounds. No murmur heard. Pulmonary:      Effort: Pulmonary effort is normal.      Breath sounds: Normal breath sounds. No rhonchi. Abdominal:      General: Bowel sounds are normal. There is no distension. Palpations: Abdomen is soft. Tenderness: There is no abdominal tenderness. Musculoskeletal:         General: No swelling. Cervical back: Neck supple. Right lower leg: No edema. Left lower leg: No edema. Lymphadenopathy:      Cervical: No cervical adenopathy. Neurological:      General: No focal deficit present. Mental Status: She is oriented to person, place, and time. Mental status is at baseline. Psychiatric:         Mood and Affect: Mood normal.         Behavior: Behavior normal.       On this date 11/15/2022 I have spent 40 minutes reviewing previous notes, test results and face to face with the patient discussing the diagnosis and importance of compliance with the treatment plan as well as documenting on the day of the visit.     Signed by:  Landy Marks MD

## 2022-12-22 ENCOUNTER — HOSPITAL ENCOUNTER (EMERGENCY)
Age: 57
Discharge: HOME | End: 2022-12-22
Attending: STUDENT IN AN ORGANIZED HEALTH CARE EDUCATION/TRAINING PROGRAM
Payer: MEDICARE

## 2022-12-22 ENCOUNTER — APPOINTMENT (OUTPATIENT)
Dept: GENERAL RADIOLOGY | Age: 57
End: 2022-12-22
Attending: STUDENT IN AN ORGANIZED HEALTH CARE EDUCATION/TRAINING PROGRAM
Payer: MEDICARE

## 2022-12-22 VITALS
DIASTOLIC BLOOD PRESSURE: 94 MMHG | SYSTOLIC BLOOD PRESSURE: 161 MMHG | OXYGEN SATURATION: 100 % | TEMPERATURE: 97.9 F | BODY MASS INDEX: 33.66 KG/M2 | HEIGHT: 63 IN | RESPIRATION RATE: 16 BRPM | HEART RATE: 77 BPM | WEIGHT: 190 LBS

## 2022-12-22 DIAGNOSIS — M54.2 CERVICALGIA: Primary | ICD-10-CM

## 2022-12-22 DIAGNOSIS — M25.511 PAIN IN JOINT OF RIGHT SHOULDER: ICD-10-CM

## 2022-12-22 LAB
ANION GAP SERPL CALC-SCNC: 4 MMOL/L (ref 5–15)
ATRIAL RATE: 74 BPM
BASOPHILS # BLD: 0 K/UL (ref 0–0.1)
BASOPHILS NFR BLD: 1 % (ref 0–1)
BUN SERPL-MCNC: 15 MG/DL (ref 6–20)
BUN/CREAT SERPL: 16 (ref 12–20)
CA-I BLD-MCNC: 10.2 MG/DL (ref 8.5–10.1)
CALCULATED P AXIS, ECG09: 10 DEGREES
CALCULATED R AXIS, ECG10: -27 DEGREES
CALCULATED T AXIS, ECG11: 1 DEGREES
CHLORIDE SERPL-SCNC: 110 MMOL/L (ref 97–108)
CO2 SERPL-SCNC: 29 MMOL/L (ref 21–32)
CREAT SERPL-MCNC: 0.96 MG/DL (ref 0.55–1.02)
DIAGNOSIS, 93000: NORMAL
DIFFERENTIAL METHOD BLD: ABNORMAL
EOSINOPHIL # BLD: 0.1 K/UL (ref 0–0.4)
EOSINOPHIL NFR BLD: 1 % (ref 0–7)
ERYTHROCYTE [DISTWIDTH] IN BLOOD BY AUTOMATED COUNT: 15.4 % (ref 11.5–14.5)
GLUCOSE SERPL-MCNC: 68 MG/DL (ref 65–100)
HCT VFR BLD AUTO: 38.9 % (ref 35–47)
HGB BLD-MCNC: 12.1 G/DL (ref 11.5–16)
IMM GRANULOCYTES # BLD AUTO: 0 K/UL (ref 0–0.04)
IMM GRANULOCYTES NFR BLD AUTO: 0 % (ref 0–0.5)
LYMPHOCYTES # BLD: 1.8 K/UL (ref 0.8–3.5)
LYMPHOCYTES NFR BLD: 32 % (ref 12–49)
MCH RBC QN AUTO: 26.7 PG (ref 26–34)
MCHC RBC AUTO-ENTMCNC: 31.1 G/DL (ref 30–36.5)
MCV RBC AUTO: 85.7 FL (ref 80–99)
MONOCYTES # BLD: 0.5 K/UL (ref 0–1)
MONOCYTES NFR BLD: 8 % (ref 5–13)
NEUTS SEG # BLD: 3.4 K/UL (ref 1.8–8)
NEUTS SEG NFR BLD: 58 % (ref 32–75)
NRBC # BLD: 0 K/UL (ref 0–0.01)
NRBC BLD-RTO: 0 PER 100 WBC
P-R INTERVAL, ECG05: 112 MS
PLATELET # BLD AUTO: 253 K/UL (ref 150–400)
PMV BLD AUTO: 11.2 FL (ref 8.9–12.9)
POTASSIUM SERPL-SCNC: 4 MMOL/L (ref 3.5–5.1)
Q-T INTERVAL, ECG07: 416 MS
QRS DURATION, ECG06: 70 MS
QTC CALCULATION (BEZET), ECG08: 461 MS
RBC # BLD AUTO: 4.54 M/UL (ref 3.8–5.2)
SODIUM SERPL-SCNC: 143 MMOL/L (ref 136–145)
TROPONIN-HIGH SENSITIVITY: 4 NG/L (ref 0–51)
VENTRICULAR RATE, ECG03: 74 BPM
WBC # BLD AUTO: 5.8 K/UL (ref 3.6–11)

## 2022-12-22 PROCEDURE — 96374 THER/PROPH/DIAG INJ IV PUSH: CPT

## 2022-12-22 PROCEDURE — 74011000250 HC RX REV CODE- 250: Performed by: STUDENT IN AN ORGANIZED HEALTH CARE EDUCATION/TRAINING PROGRAM

## 2022-12-22 PROCEDURE — 36415 COLL VENOUS BLD VENIPUNCTURE: CPT

## 2022-12-22 PROCEDURE — 73030 X-RAY EXAM OF SHOULDER: CPT

## 2022-12-22 PROCEDURE — 85025 COMPLETE CBC W/AUTO DIFF WBC: CPT

## 2022-12-22 PROCEDURE — 99285 EMERGENCY DEPT VISIT HI MDM: CPT

## 2022-12-22 PROCEDURE — 74011250636 HC RX REV CODE- 250/636: Performed by: STUDENT IN AN ORGANIZED HEALTH CARE EDUCATION/TRAINING PROGRAM

## 2022-12-22 PROCEDURE — 74011250637 HC RX REV CODE- 250/637: Performed by: STUDENT IN AN ORGANIZED HEALTH CARE EDUCATION/TRAINING PROGRAM

## 2022-12-22 PROCEDURE — 93005 ELECTROCARDIOGRAM TRACING: CPT

## 2022-12-22 PROCEDURE — 84484 ASSAY OF TROPONIN QUANT: CPT

## 2022-12-22 PROCEDURE — 80048 BASIC METABOLIC PNL TOTAL CA: CPT

## 2022-12-22 PROCEDURE — 71046 X-RAY EXAM CHEST 2 VIEWS: CPT

## 2022-12-22 RX ORDER — LIDOCAINE 4 G/100G
1 PATCH TOPICAL EVERY 24 HOURS
Status: DISCONTINUED | OUTPATIENT
Start: 2022-12-22 | End: 2022-12-22 | Stop reason: HOSPADM

## 2022-12-22 RX ORDER — ACETAMINOPHEN 325 MG/1
975 TABLET ORAL
Status: COMPLETED | OUTPATIENT
Start: 2022-12-22 | End: 2022-12-22

## 2022-12-22 RX ORDER — KETOROLAC TROMETHAMINE 30 MG/ML
15 INJECTION, SOLUTION INTRAMUSCULAR; INTRAVENOUS
Status: COMPLETED | OUTPATIENT
Start: 2022-12-22 | End: 2022-12-22

## 2022-12-22 RX ADMIN — KETOROLAC TROMETHAMINE 15 MG: 30 INJECTION, SOLUTION INTRAMUSCULAR; INTRAVENOUS at 16:09

## 2022-12-22 RX ADMIN — ACETAMINOPHEN 975 MG: 325 TABLET ORAL at 16:09

## 2022-12-22 NOTE — ED PROVIDER NOTES
EMERGENCY DEPARTMENT HISTORY AND PHYSICAL EXAM      Date: 12/22/2022  Patient Name: Kimberly Becerra    History of Presenting Illness     Chief Complaint   Patient presents with    Arm Pain       History Provided By: Patient    HPI: Kimberly Becerra, 62 y.o. female with past history as noted below presenting to the ED for right arm pain and shoulder pain x2 days. Described as sharp, radiating from the right neck and shoulder down to the arm, not associated with any nausea or vomiting or shortness of breath. She has not had any chest pain. She denies any trauma to the arm or shoulder. She denies any numbness or tingling in the hands or fingers or weakness. She denies any swelling or prior blood clots. Denies any fever or chills. She denies any recent procedures. There are no other complaints, changes, or physical findings at this time.     Past History     Past Medical History:  Past Medical History:   Diagnosis Date    Abnormal weight gain 9/8/2020    Abnormal weight gain 9/9/2020    Anemia     Anemia     Arthritis     Asthma     Cellulitis and abscess of lower extremity 9/9/2020    Chronic back pain 12/8/2020    Chronic kidney disease     Chronic obstructive pulmonary disease (HCC)     Chronic pain     Chronic sinusitis 9/8/2020    Contact dermatitis and eczema due to cause     COPD (chronic obstructive pulmonary disease) ([address] 9/9/2020    Depression     Depressive disorder 9/9/2020    Diabetes ([address]     DM (diabetes mellitus), type 2 ([address] 9/9/2020    DM (diabetes mellitus), type 2, uncontrolled 9/8/2020    Dyslipidemia 9/9/2020    Dysphagia 9/8/2020    Dysphagia 9/9/2020    Essential hypertension 9/8/2020    Essential hypertension 9/9/2020    GERD (gastroesophageal reflux disease) 9/8/2020    GERD (gastroesophageal reflux disease)     Headache     Hemorrhoids 9/8/2020    HIV (human immunodeficiency virus infection) ([address]     Hypercholesterolemia     Hypertension     Hypokalemia 9/8/2020    Insomnia 9/8/2020    Insomnia 9/9/2020    Mononeuritis 9/8/2020    Mononeuritis 9/9/2020    Neck pain 9/9/2020    Noncompliance with treatment 9/8/2020    Noncompliance with treatment 9/9/2020    Pain in throat 9/8/2020    Pain in throat 9/9/2020    Pruritus of skin 9/8/2020    TIA (transient ischemic attack) 9/8/2020    TIA (transient ischemic attack) 9/9/2020    TIA (transient ischemic attack) 9/9/2020    Tick bite 9/8/2020    Tick bite 9/9/2020    Tobacco dependence 9/8/2020    Vitamin D deficiency 9/8/2020    Vitamin D deficiency 9/9/2020       Past Surgical History:  Past Surgical History:   Procedure Laterality Date    HX APPENDECTOMY      HX APPENDECTOMY  2012    HX COLONOSCOPY      HX COLONOSCOPY  06/25/2017    HX GI      HX GYN      HX HYSTERECTOMY      HX ORTHOPAEDIC      HX TOTAL LAPAROSCOPIC HYSTERECTOMY  2013    VASCULAR SURGERY PROCEDURE UNLIST         Family History:  Family History   Problem Relation Age of Onset    Hypertension Mother     Thyroid Disease Mother     Heart Disease Father     Hypertension Father     Diabetes Father     Prostate Cancer Father     Diabetes Maternal Grandmother     Prostate Cancer Brother        Social History:  Social History     Tobacco Use    Smoking status: Former     Packs/day: 1.00     Years: 15.00     Pack years: 15.00     Types: Cigarettes     Quit date: 2019     Years since quitting: 3.9    Smokeless tobacco: Never   Vaping Use    Vaping Use: Never used   Substance Use Topics    Alcohol use: Not Currently    Drug use: Never       Allergies:   Allergies   Allergen Reactions    Latex Unknown (comments)    Lisinopril Swelling     SWELLING OF THE THROAT    Lisinopril Other (comments)     PT.  States causes swelling lf the throat    Bactrim [Sulfamethoprim] Hives    Elavil Hives    Iron Nausea and Vomiting    Lyrica [Pregabalin] Hives    Adhesive Rash    Sulfamethoxazole Unknown (comments)    Trimethoprim Unknown (comments)       PCP: Steven Mitchell MD    No current facility-administered medications on file prior to encounter. Current Outpatient Medications on File Prior to Encounter   Medication Sig Dispense Refill    insulin glargine (LANTUS,BASAGLAR) 100 unit/mL (3 mL) inpn 50 Units by SubCUTAneous route nightly. 5 Pen 0    insulin lispro (HumaLOG KwikPen Insulin) 100 unit/mL kwikpen 20 Units by SubCUTAneous route three (3) times daily (with meals). 5 Pen 0    Insulin Needles, Disposable, 31 gauge x 5/16\" ndle To take insulin injection 4 times a dayE11.65 100 Each 3    atorvastatin (LIPITOR) 20 mg tablet Take 1 Tablet by mouth daily. 90 Tablet 1    pioglitazone (ACTOS) 30 mg tablet Take 1 Tablet by mouth Daily (before breakfast). 90 Tablet 0    sertraline (ZOLOFT) 50 mg tablet Take 1 Tablet by mouth daily. 90 Tablet 1    pantoprazole (PROTONIX) 40 mg tablet Take 40 mg by mouth daily. sucralfate (CARAFATE) 1 gram tablet Take 1 g by mouth four (4) times daily. multivitamin (ONE A DAY) tablet Take 1 Tablet by mouth daily. 90 Tablet 1    calcium carbonate (Calcium 500) 500 mg calcium (1,250 mg) chewable tablet Take 1 Tablet by mouth daily. 90 Tablet 1    fluticasone propionate (FLONASE) 50 mcg/actuation nasal spray 2 Sprays by Both Nostrils route daily. Indications: inflammation of the nose due to an allergy 1 Each 4    ondansetron (ZOFRAN ODT) 4 mg disintegrating tablet Take 1 Tablet by mouth every eight (8) hours as needed for Nausea or Vomiting. 15 Tablet 0    triamterene-hydroCHLOROthiazide (DYAZIDE) 37.5-25 mg per capsule Take 1 Capsule by mouth Every morning. 90 Capsule 1    Symbicort 160-4.5 mcg/actuation HFAA Take 2 Puffs by inhalation two (2) times a day. 1 Each 5    albuterol (PROVENTIL HFA, VENTOLIN HFA, PROAIR HFA) 90 mcg/actuation inhaler INHALE 2 PUFFS BY MOUTH EVERY 4-6 HOURS AS FOR WHEEZING 1 Each 5    metoprolol succinate (TOPROL-XL) 25 mg XL tablet Take 1 Tablet by mouth in the morning.  90 Tablet 1    montelukast (SINGULAIR) 10 mg tablet Take 1 Tablet by mouth in the morning. 90 Tablet 1    empagliflozin (JARDIANCE) 25 mg tablet Take 25 mg by mouth Daily (before breakfast). cholecalciferol (Vitamin D3) 25 mcg (1,000 unit) cap Take 1 Capsule by mouth daily. 90 Capsule 1    multivit-min-FA-lycopen-lutein (Spectravite Adult 50 Plus) 0.4-300-250 mg-mcg-mcg tab Take 1 Tablet by mouth daily. 90 Tablet 1    lancets misc Check glucose 3 times a day, 90 days E11.65, Z 79.4 300 Each 2    glucose blood VI test strips (OneTouch Verio test strips) strip Check glucose 3 times a day, 90 days E11.65, Z.79.4 300 Strip 2    insulin glargine (LANTUS,BASAGLAR) 100 unit/mL (3 mL) inpn Inject 35 units at bedtime, 90 days (Patient taking differently: 50 Units by SubCUTAneous route nightly.) 36 mL 1    Insulin Needles, Disposable, 31 gauge x 5/16\" ndle 4 times a day 200 Each 1    glucose blood VI test strips (ASCENSIA AUTODISC VI, ONE TOUCH ULTRA TEST VI) strip to check blood sugar 3 times a day before each meal and in rotation being on insulin (Patient not taking: No sig reported) 100 Strip 3    acyclovir (ZOVIRAX) 400 mg tablet Take 1 Tab by mouth two (2) times a day. Biktarvy tab tablet Take 1 Tab by mouth daily. 50mg/200 mg/25 mg per day. aspirin delayed-release 81 mg tablet Take 1 Tab by mouth daily. Review of Systems   Review of Systems  A 10 point review of system was performed and was negative except as noted above in HPI    Physical Exam   Physical Exam  Vitals and nursing note reviewed. Constitutional:       General: She is not in acute distress. Appearance: Normal appearance. HENT:      Head: Normocephalic. Eyes:      Extraocular Movements: Extraocular movements intact. Pupils: Pupils are equal, round, and reactive to light. Cardiovascular:      Rate and Rhythm: Normal rate. Pulmonary:      Effort: Pulmonary effort is normal.      Breath sounds: Normal breath sounds. Abdominal:      General: Abdomen is flat. Musculoskeletal:      Cervical back: Neck supple. Comments: There is right arm pain with movement of the right shoulder and right arm. There is a positive Spurling test along the right side. Median, ulnar, radial nerve motor and sensation function intact and equal bilaterally. There is full range of motion of the shoulder although there is pain with movement. There is no swelling or erythema of the bilateral upper extremities. There is equal size. There is normal distal radial pulses and normal cap refill. There is normal sensation throughout. Strength 5 out of 5 and equal throughout C2-C7 myotomes. Skin:     General: Skin is warm. Neurological:      Mental Status: She is alert and oriented to person, place, and time. Lab and Diagnostic Study Results   Labs -     Recent Results (from the past 12 hour(s))   CBC WITH AUTOMATED DIFF    Collection Time: 12/22/22  3:53 PM   Result Value Ref Range    WBC 5.8 3.6 - 11.0 K/uL    RBC 4.54 3.80 - 5.20 M/uL    HGB 12.1 11.5 - 16.0 g/dL    HCT 38.9 35.0 - 47.0 %    MCV 85.7 80.0 - 99.0 FL    MCH 26.7 26.0 - 34.0 PG    MCHC 31.1 30.0 - 36.5 g/dL    RDW 15.4 (H) 11.5 - 14.5 %    PLATELET 206 943 - 700 K/uL    MPV 11.2 8.9 - 12.9 FL    NRBC 0.0 0.0  WBC    ABSOLUTE NRBC 0.00 0.00 - 0.01 K/uL    NEUTROPHILS 58 32 - 75 %    LYMPHOCYTES 32 12 - 49 %    MONOCYTES 8 5 - 13 %    EOSINOPHILS 1 0 - 7 %    BASOPHILS 1 0 - 1 %    IMMATURE GRANULOCYTES 0 0 - 0.5 %    ABS. NEUTROPHILS 3.4 1.8 - 8.0 K/UL    ABS. LYMPHOCYTES 1.8 0.8 - 3.5 K/UL    ABS. MONOCYTES 0.5 0.0 - 1.0 K/UL    ABS. EOSINOPHILS 0.1 0.0 - 0.4 K/UL    ABS. BASOPHILS 0.0 0.0 - 0.1 K/UL    ABS. IMM.  GRANS. 0.0 0.00 - 0.04 K/UL    DF AUTOMATED     METABOLIC PANEL, BASIC    Collection Time: 12/22/22  3:53 PM   Result Value Ref Range    Sodium 143 136 - 145 mmol/L    Potassium 4.0 3.5 - 5.1 mmol/L    Chloride 110 (H) 97 - 108 mmol/L    CO2 29 21 - 32 mmol/L    Anion gap 4 (L) 5 - 15 mmol/L Glucose 68 65 - 100 mg/dL    BUN 15 6 - 20 mg/dL    Creatinine 0.96 0.55 - 1.02 mg/dL    BUN/Creatinine ratio 16 12 - 20      eGFR >60 >60 ml/min/1.73m2    Calcium 10.2 (H) 8.5 - 10.1 mg/dL   TROPONIN-HIGH SENSITIVITY    Collection Time: 12/22/22  3:53 PM   Result Value Ref Range    Troponin-High Sensitivity 4 0 - 51 ng/L       Radiologic Studies -   @[email or web address]@  CT Results  (Last 48 hours)      None          CXR Results  (Last 48 hours)                 12/22/22 1441  XR CHEST PA LAT Final result    Impression:      No acute cardiopulmonary process. Narrative:  EXAM: XR CHEST PA LAT       INDICATION: Cardiac evaluation       COMPARISON: None       TECHNIQUE: PA and lateral chest 2 views       FINDINGS: Mild cardiomegaly. . The pulmonary vasculature is within normal limits. The lungs and pleural spaces are clear. The visualized bones and upper abdomen   are age-appropriate. Medical Decision Making and ED Course   Differential Diagnosis & Medical Decision Making Provider Note:   59-year-old female presenting to the ED for right arm pain x2 days. Differential diagnosis includes cervicalgia, right shoulder pain, calcific tendinosis, ACS, no evidence on history or exam to suggest DVT or cellulitis. There is also no evidence of any vascular disruption. Will obtain labs provide medications and obtain x-rays.    - I am the first provider for this patient. I reviewed the vital signs, available nursing notes, past medical history, past surgical history, family history and social history. The patients presenting problems have been discussed, and they are in agreement with the care plan formulated and outlined with them. I have encouraged them to ask questions as they arise throughout their visit. Vital Signs-Reviewed the patient's vital signs.   Patient Vitals for the past 12 hrs:   Temp Pulse Resp BP SpO2   12/22/22 1402 97.9 °F (36.6 °C) 77 16 (!) 161/94 100 %       ED Course: ED Course as of 12/22/22 1650   Thu Dec 22, 2022   1412 EKG: Normal sinus rhythm at 74 bpm, leftward axis, normal intervals, no acute ischemic changes, there is T wave inversion in 3 [PC]      ED Course User Index  [PC] Glenn Henson MD   Lab work returned unremarkable for acute process, troponin negative, x-ray chest negative, x-ray shoulder shows subacromial spurring. Believe her symptoms to be related to either shoulder arthritic changes or cervicalgia. We will have her follow-up with orthopedics. The patient is agreeable to this plan and had some improvement in her symptoms. Understanding was insured that at this time there is no evidence for a more malignant underlying process, but that early in the process of an illness, an emergency department workup can be falsely reassuring. Routine discharge counseling was given including the fact that any worsening, changing or persistent symptoms should prompt an immediate call or follow up with their primary physician or the emergency department. The importance of appropriate follow up was also discussed. More extensive discharge instructions were given in the patient's discharge paperwork. After completion of evaluation and discussion of results and diagnoses, all the questions were answered. If required, all follow up appointments and treatments were discussed and explained. Understanding was insured prior to discharge. Procedures   Performed by: Irina Ortega MD  Procedures      Disposition   Disposition: DC- Adult Discharges: All of the diagnostic tests were reviewed and questions answered. Diagnosis, care plan and treatment options were discussed. The patient understands the instructions and will follow up as directed. The patients results have been reviewed with them. They have been counseled regarding their diagnosis.   The patient verbally convey understanding and agreement of the signs, symptoms, diagnosis, treatment and prognosis and additionally agrees to follow up as recommended with their PCP in 24 - 48 hours. They also agree with the care-plan and convey that all of their questions have been answered. I have also put together some discharge instructions for them that include: 1) educational information regarding their diagnosis, 2) how to care for their diagnosis at home, as well a 3) list of reasons why they would want to return to the ED prior to their follow-up appointment, should their condition change. DISCHARGE PLAN:  1. Current Discharge Medication List        CONTINUE these medications which have NOT CHANGED    Details   !! insulin glargine (LANTUS,BASAGLAR) 100 unit/mL (3 mL) inpn 50 Units by SubCUTAneous route nightly. Qty: 5 Pen, Refills: 0      insulin lispro (HumaLOG KwikPen Insulin) 100 unit/mL kwikpen 20 Units by SubCUTAneous route three (3) times daily (with meals). Qty: 5 Pen, Refills: 0    Associated Diagnoses: Type 2 diabetes mellitus with hyperglycemia, with long-term current use of insulin ([facility]      ! ! Insulin Needles, Disposable, 31 gauge x 5/16\" ndle To take insulin injection 4 times a dayE11.65  Qty: 100 Each, Refills: 3      atorvastatin (LIPITOR) 20 mg tablet Take 1 Tablet by mouth daily. Qty: 90 Tablet, Refills: 1      pioglitazone (ACTOS) 30 mg tablet Take 1 Tablet by mouth Daily (before breakfast). Qty: 90 Tablet, Refills: 0      sertraline (ZOLOFT) 50 mg tablet Take 1 Tablet by mouth daily. Qty: 90 Tablet, Refills: 1      pantoprazole (PROTONIX) 40 mg tablet Take 40 mg by mouth daily. sucralfate (CARAFATE) 1 gram tablet Take 1 g by mouth four (4) times daily. multivitamin (ONE A DAY) tablet Take 1 Tablet by mouth daily. Qty: 90 Tablet, Refills: 1      calcium carbonate (Calcium 500) 500 mg calcium (1,250 mg) chewable tablet Take 1 Tablet by mouth daily.   Qty: 90 Tablet, Refills: 1      fluticasone propionate (FLONASE) 50 mcg/actuation nasal spray 2 Sprays by Both Nostrils route daily. Indications: inflammation of the nose due to an allergy  Qty: 1 Each, Refills: 4    Associated Diagnoses: Hx of seasonal allergies      ondansetron (ZOFRAN ODT) 4 mg disintegrating tablet Take 1 Tablet by mouth every eight (8) hours as needed for Nausea or Vomiting. Qty: 15 Tablet, Refills: 0      triamterene-hydroCHLOROthiazide (DYAZIDE) 37.5-25 mg per capsule Take 1 Capsule by mouth Every morning. Qty: 90 Capsule, Refills: 1      Symbicort 160-4.5 mcg/actuation HFAA Take 2 Puffs by inhalation two (2) times a day. Qty: 1 Each, Refills: 5      albuterol (PROVENTIL HFA, VENTOLIN HFA, PROAIR HFA) 90 mcg/actuation inhaler INHALE 2 PUFFS BY MOUTH EVERY 4-6 HOURS AS FOR WHEEZING  Qty: 1 Each, Refills: 5    Associated Diagnoses: Chronic obstructive pulmonary disease, unspecified COPD type (HCC)      metoprolol succinate (TOPROL-XL) 25 mg XL tablet Take 1 Tablet by mouth in the morning. Qty: 90 Tablet, Refills: 1    Associated Diagnoses: Essential (primary) hypertension      montelukast (SINGULAIR) 10 mg tablet Take 1 Tablet by mouth in the morning. Qty: 90 Tablet, Refills: 1      empagliflozin (JARDIANCE) 25 mg tablet Take 25 mg by mouth Daily (before breakfast). cholecalciferol (Vitamin D3) 25 mcg (1,000 unit) cap Take 1 Capsule by mouth daily. Qty: 90 Capsule, Refills: 1    Associated Diagnoses: Vitamin D deficiency      multivit-min-FA-lycopen-lutein (Spectravite Adult 50 Plus) 0.4-300-250 mg-mcg-mcg tab Take 1 Tablet by mouth daily. Qty: 90 Tablet, Refills: 1    Associated Diagnoses: Human immunodeficiency virus (HIV) disease ([address]      lancets misc Check glucose 3 times a day, 90 days E11.65, Z 79.4  Qty: 300 Each, Refills: 2    Comments: Patient is on insulin  Associated Diagnoses: Type 2 diabetes mellitus with hyperglycemia, with long-term current use of insulin ([address]      ! ! glucose blood VI test strips (OneTouch Verio test strips) strip Check glucose 3 times a day, 90 days E11.65, Z.79.4  Qty: 300 Strip, Refills: 2    Comments: Patient is on insulin. Medicare should cover to check 3 times a day. Associated Diagnoses: Type 2 diabetes mellitus with hyperglycemia, with long-term current use of insulin ([address] Encounter for long-term (current) use of insulin ([address]      ! ! insulin glargine (LANTUS,BASAGLAR) 100 unit/mL (3 mL) inpn Inject 35 units at bedtime, 90 days  Qty: 36 mL, Refills: 1    Associated Diagnoses: Type 2 diabetes mellitus with hyperglycemia, with long-term current use of insulin ([address]      ! ! Insulin Needles, Disposable, 31 gauge x 5/16\" ndle 4 times a day  Qty: 200 Each, Refills: 1    Associated Diagnoses: Type 2 diabetes mellitus with hyperglycemia, with long-term current use of insulin ([address]      ! ! glucose blood VI test strips (ASCENSIA AUTODISC VI, ONE TOUCH ULTRA TEST VI) strip to check blood sugar 3 times a day before each meal and in rotation being on insulin  Qty: 100 Strip, Refills: 3      acyclovir (ZOVIRAX) 400 mg tablet Take 1 Tab by mouth two (2) times a day. Biktarvy tab tablet Take 1 Tab by mouth daily. 50mg/200 mg/25 mg per day. aspirin delayed-release 81 mg tablet Take 1 Tab by mouth daily. !! - Potential duplicate medications found. Please discuss with provider. 2.   Follow-up Information       Follow up With Specialties Details Why [address]ANIL[address]  Call in 1 day  [address] [facility]  [address]  [phone]          3. Return to ED if worse   4. Current Discharge Medication List          Diagnosis/Clinical Impression     Clinical Impression:   1. Cervicalgia    2. Pain in joint of right shoulder        Attestations: I, Blanca Lyon MD, am the primary clinician of record. Please note that this dictation was completed with [employer], the [employer] voice recognition software.   Quite often unanticipated grammatical, syntax, homophones, and other interpretive errors are inadvertently transcribed by the computer software. Please disregard these errors. Please excuse any errors that have escaped final proofreading. Thank you.

## 2022-12-22 NOTE — DISCHARGE INSTRUCTIONS
Thank you! Thank you for allowing me to care for you in the emergency department. I sincerely hope that you are satisfied with your visit today. It is my goal to provide you with excellent care. Below you will find a list of your labs and imaging from your visit today if applicable. Should you have any questions regarding these results please do not hesitate to call the emergency department. Please review [employer] for a more detailed result list since the below list may not be comprehensive. Instructions on how to sign up to [employer] should be provided in this packet. Labs -     Recent Results (from the past 12 hour(s))   CBC WITH AUTOMATED DIFF    Collection Time: 12/22/22  3:53 PM   Result Value Ref Range    WBC 5.8 3.6 - 11.0 K/uL    RBC 4.54 3.80 - 5.20 M/uL    HGB 12.1 11.5 - 16.0 g/dL    HCT 38.9 35.0 - 47.0 %    MCV 85.7 80.0 - 99.0 FL    MCH 26.7 26.0 - 34.0 PG    MCHC 31.1 30.0 - 36.5 g/dL    RDW 15.4 (H) 11.5 - 14.5 %    PLATELET 885 413 - 921 K/uL    MPV 11.2 8.9 - 12.9 FL    NRBC 0.0 0.0  WBC    ABSOLUTE NRBC 0.00 0.00 - 0.01 K/uL    NEUTROPHILS 58 32 - 75 %    LYMPHOCYTES 32 12 - 49 %    MONOCYTES 8 5 - 13 %    EOSINOPHILS 1 0 - 7 %    BASOPHILS 1 0 - 1 %    IMMATURE GRANULOCYTES 0 0 - 0.5 %    ABS. NEUTROPHILS 3.4 1.8 - 8.0 K/UL    ABS. LYMPHOCYTES 1.8 0.8 - 3.5 K/UL    ABS. MONOCYTES 0.5 0.0 - 1.0 K/UL    ABS. EOSINOPHILS 0.1 0.0 - 0.4 K/UL    ABS. BASOPHILS 0.0 0.0 - 0.1 K/UL    ABS. IMM.  GRANS. 0.0 0.00 - 0.04 K/UL    DF AUTOMATED     METABOLIC PANEL, BASIC    Collection Time: 12/22/22  3:53 PM   Result Value Ref Range    Sodium 143 136 - 145 mmol/L    Potassium 4.0 3.5 - 5.1 mmol/L    Chloride 110 (H) 97 - 108 mmol/L    CO2 29 21 - 32 mmol/L    Anion gap 4 (L) 5 - 15 mmol/L    Glucose 68 65 - 100 mg/dL    BUN 15 6 - 20 mg/dL    Creatinine 0.96 0.55 - 1.02 mg/dL    BUN/Creatinine ratio 16 12 - 20      eGFR >60 >60 ml/min/1.73m2    Calcium 10.2 (H) 8.5 - 10.1 mg/dL   TROPONIN-HIGH SENSITIVITY    Collection Time: 12/22/22  3:53 PM   Result Value Ref Range    Troponin-High Sensitivity 4 0 - 51 ng/L       Radiologic Studies -   XR CHEST PA LAT   Final Result      No acute cardiopulmonary process. XR SHOULDER RT AP/LAT MIN 2 V   Final Result      Subacromial spurring without fracture. CT Results  (Last 48 hours)      None          CXR Results  (Last 48 hours)                 12/22/22 1441  XR CHEST PA LAT Final result    Impression:      No acute cardiopulmonary process. Narrative:  EXAM: XR CHEST PA LAT       INDICATION: Cardiac evaluation       COMPARISON: None       TECHNIQUE: PA and lateral chest 2 views       FINDINGS: Mild cardiomegaly. . The pulmonary vasculature is within normal limits. The lungs and pleural spaces are clear. The visualized bones and upper abdomen   are age-appropriate. If you feel that you have not received excellent quality care or timely care, please ask to speak to the nurse manager. Please choose us in the future for your continued health care needs. ------------------------------------------------------------------------------------------------------------  The exam and treatment you received in the Emergency Department were for an urgent problem and are not intended as complete care. It is very important that you follow-up with a doctor, nurse practitioner, or physician assistant in a timely manner to:  (1) confirm your diagnosis and review all imaging and lab results,  (2) re-evaluation of changes in your illness and treatment, and  (3) for ongoing care. If your symptoms become worse or you do not improve as expected and you are unable to reach your usual health care provider, you should return to the Emergency Department. We are available 24 hours a day. Please take your discharge instructions with you when you go to your follow-up appointment.      If a prescription has been provided, please have it filled as soon as possible to prevent a delay in treatment. Read the entire medication instruction sheet provided to you by the pharmacy. If you have any questions or reservations about taking the medication due to side effects or interactions with other medications, please call your primary care physician or contact the ER to speak with the charge nurse. Make an appointment with your family doctor or the physician you were referred to for follow-up of this visit as instructed on your discharge paperwork, as this is a mandatory follow-up. Return to the ER if you are unable to be seen or if you are unable to be seen in a timely manner. If you have any problem arranging the follow-up visit, contact the Emergency Department immediately.

## 2022-12-23 RX ORDER — INSULIN GLARGINE 100 [IU]/ML
INJECTION, SOLUTION SUBCUTANEOUS
Qty: 15 ML | Refills: 0 | Status: SHIPPED | OUTPATIENT
Start: 2022-12-23

## 2022-12-23 RX ORDER — MONTELUKAST SODIUM 10 MG/1
TABLET ORAL
Qty: 90 TABLET | Refills: 0 | Status: SHIPPED | OUTPATIENT
Start: 2022-12-23

## 2022-12-23 RX ORDER — PIOGLITAZONEHYDROCHLORIDE 30 MG/1
TABLET ORAL
Qty: 90 TABLET | Refills: 0 | Status: SHIPPED | OUTPATIENT
Start: 2022-12-23

## 2022-12-23 RX ORDER — TRIAMTERENE AND HYDROCHLOROTHIAZIDE 37.5; 25 MG/1; MG/1
CAPSULE ORAL
Qty: 90 CAPSULE | Refills: 0 | Status: SHIPPED | OUTPATIENT
Start: 2022-12-23

## 2023-01-24 RX ORDER — OMEPRAZOLE 40 MG/1
CAPSULE, DELAYED RELEASE ORAL
Qty: 90 CAPSULE | Refills: 1 | Status: SHIPPED | OUTPATIENT
Start: 2023-01-24

## 2023-01-27 RX ORDER — PIOGLITAZONEHYDROCHLORIDE 30 MG/1
TABLET ORAL
Qty: 90 TABLET | Refills: 0 | Status: SHIPPED | OUTPATIENT
Start: 2023-01-27

## 2023-03-11 PROBLEM — E55.9 VITAMIN D DEFICIENCY: Status: RESOLVED | Noted: 2020-09-08 | Resolved: 2023-03-11

## 2023-03-15 ENCOUNTER — OFFICE VISIT (OUTPATIENT)
Dept: INTERNAL MEDICINE CLINIC | Age: 58
End: 2023-03-15
Payer: MEDICARE

## 2023-03-15 ENCOUNTER — HOSPITAL ENCOUNTER (OUTPATIENT)
Dept: GENERAL RADIOLOGY | Age: 58
Discharge: HOME | End: 2023-03-15
Attending: PODIATRIST
Payer: MEDICARE

## 2023-03-15 ENCOUNTER — HOSPITAL ENCOUNTER (OUTPATIENT)
Dept: PREADMISSION TESTING | Age: 58
Discharge: HOME | End: 2023-03-15
Payer: MEDICARE

## 2023-03-15 VITALS
DIASTOLIC BLOOD PRESSURE: 75 MMHG | HEIGHT: 63 IN | HEART RATE: 65 BPM | SYSTOLIC BLOOD PRESSURE: 137 MMHG | RESPIRATION RATE: 16 BRPM | WEIGHT: 197.9 LBS | OXYGEN SATURATION: 100 % | TEMPERATURE: 96.9 F | BODY MASS INDEX: 35.07 KG/M2

## 2023-03-15 VITALS
OXYGEN SATURATION: 99 % | HEIGHT: 63 IN | DIASTOLIC BLOOD PRESSURE: 80 MMHG | SYSTOLIC BLOOD PRESSURE: 128 MMHG | RESPIRATION RATE: 18 BRPM | BODY MASS INDEX: 34.69 KG/M2 | HEART RATE: 72 BPM | TEMPERATURE: 98.3 F | WEIGHT: 195.8 LBS

## 2023-03-15 DIAGNOSIS — I10 ESSENTIAL (PRIMARY) HYPERTENSION: Primary | ICD-10-CM

## 2023-03-15 DIAGNOSIS — E78.2 MIXED HYPERLIPIDEMIA: ICD-10-CM

## 2023-03-15 DIAGNOSIS — K21.9 GASTROESOPHAGEAL REFLUX DISEASE WITHOUT ESOPHAGITIS: ICD-10-CM

## 2023-03-15 DIAGNOSIS — Z79.4 LONG TERM (CURRENT) USE OF INSULIN (HCC): ICD-10-CM

## 2023-03-15 DIAGNOSIS — Z86.73 HISTORY OF TIA (TRANSIENT ISCHEMIC ATTACK): ICD-10-CM

## 2023-03-15 DIAGNOSIS — Z87.891 EX-SMOKER: ICD-10-CM

## 2023-03-15 DIAGNOSIS — F32.A DEPRESSIVE DISORDER: ICD-10-CM

## 2023-03-15 DIAGNOSIS — E11.65 UNCONTROLLED TYPE 2 DIABETES MELLITUS WITH HYPERGLYCEMIA (HCC): ICD-10-CM

## 2023-03-15 DIAGNOSIS — Z96.41 INSULIN PUMP IN PLACE: ICD-10-CM

## 2023-03-15 DIAGNOSIS — B20 HUMAN IMMUNODEFICIENCY VIRUS (HIV) DISEASE (HCC): ICD-10-CM

## 2023-03-15 DIAGNOSIS — J44.9 CHRONIC OBSTRUCTIVE PULMONARY DISEASE, UNSPECIFIED COPD TYPE (HCC): ICD-10-CM

## 2023-03-15 LAB
ALBUMIN SERPL-MCNC: 3.7 G/DL (ref 3.5–5)
ALBUMIN/GLOB SERPL: 0.9 (ref 1.1–2.2)
ALP SERPL-CCNC: 65 U/L (ref 45–117)
ALT SERPL-CCNC: 23 U/L (ref 12–78)
ANION GAP SERPL CALC-SCNC: 4 MMOL/L (ref 5–15)
APTT PPP: 26.6 SEC (ref 22.1–31)
AST SERPL-CCNC: 15 U/L (ref 15–37)
ATRIAL RATE: 67 BPM
BASOPHILS # BLD: 0 K/UL (ref 0–0.1)
BASOPHILS NFR BLD: 0 % (ref 0–1)
BILIRUB SERPL-MCNC: 0.4 MG/DL (ref 0.2–1)
BUN SERPL-MCNC: 14 MG/DL (ref 6–20)
BUN/CREAT SERPL: 12 (ref 12–20)
CALCIUM SERPL-MCNC: 9.3 MG/DL (ref 8.5–10.1)
CALCULATED P AXIS, ECG09: 64 DEGREES
CALCULATED R AXIS, ECG10: -18 DEGREES
CALCULATED T AXIS, ECG11: 30 DEGREES
CHLORIDE SERPL-SCNC: 109 MMOL/L (ref 97–108)
CO2 SERPL-SCNC: 29 MMOL/L (ref 21–32)
CREAT SERPL-MCNC: 1.13 MG/DL (ref 0.55–1.02)
DIAGNOSIS, 93000: NORMAL
DIFFERENTIAL METHOD BLD: ABNORMAL
EOSINOPHIL # BLD: 0.1 K/UL (ref 0–0.4)
EOSINOPHIL NFR BLD: 2 % (ref 0–7)
ERYTHROCYTE [DISTWIDTH] IN BLOOD BY AUTOMATED COUNT: 14.6 % (ref 11.5–14.5)
GLOBULIN SER CALC-MCNC: 4.1 G/DL (ref 2–4)
GLUCOSE SERPL-MCNC: 136 MG/DL (ref 65–100)
HBA1C MFR BLD HPLC: 7.6 %
HCT VFR BLD AUTO: 41.6 % (ref 35–47)
HGB BLD-MCNC: 13.3 G/DL (ref 11.5–16)
IMM GRANULOCYTES # BLD AUTO: 0 K/UL (ref 0–0.04)
IMM GRANULOCYTES NFR BLD AUTO: 0 % (ref 0–0.5)
INR PPP: 1 (ref 0.9–1.1)
LYMPHOCYTES # BLD: 1.8 K/UL (ref 0.8–3.5)
LYMPHOCYTES NFR BLD: 35 % (ref 12–49)
MCH RBC QN AUTO: 26.8 PG (ref 26–34)
MCHC RBC AUTO-ENTMCNC: 32 G/DL (ref 30–36.5)
MCV RBC AUTO: 83.7 FL (ref 80–99)
MONOCYTES # BLD: 0.3 K/UL (ref 0–1)
MONOCYTES NFR BLD: 6 % (ref 5–13)
NEUTS SEG # BLD: 3 K/UL (ref 1.8–8)
NEUTS SEG NFR BLD: 57 % (ref 32–75)
NRBC # BLD: 0 K/UL (ref 0–0.01)
NRBC BLD-RTO: 0 PER 100 WBC
P-R INTERVAL, ECG05: 146 MS
PLATELET # BLD AUTO: 235 K/UL (ref 150–400)
PMV BLD AUTO: 12.1 FL (ref 8.9–12.9)
POTASSIUM SERPL-SCNC: 3.7 MMOL/L (ref 3.5–5.1)
PROT SERPL-MCNC: 7.8 G/DL (ref 6.4–8.2)
PROTHROMBIN TIME: 10.3 SEC (ref 9–11.1)
Q-T INTERVAL, ECG07: 428 MS
QRS DURATION, ECG06: 72 MS
QTC CALCULATION (BEZET), ECG08: 452 MS
RBC # BLD AUTO: 4.97 M/UL (ref 3.8–5.2)
SODIUM SERPL-SCNC: 142 MMOL/L (ref 136–145)
THERAPEUTIC RANGE,PTTT: NORMAL SECS (ref 58–77)
VENTRICULAR RATE, ECG03: 67 BPM
WBC # BLD AUTO: 5.3 K/UL (ref 3.6–11)

## 2023-03-15 PROCEDURE — 83036 HEMOGLOBIN GLYCOSYLATED A1C: CPT | Performed by: INTERNAL MEDICINE

## 2023-03-15 PROCEDURE — G8417 CALC BMI ABV UP PARAM F/U: HCPCS | Performed by: INTERNAL MEDICINE

## 2023-03-15 PROCEDURE — G9899 SCRN MAM PERF RSLTS DOC: HCPCS | Performed by: INTERNAL MEDICINE

## 2023-03-15 PROCEDURE — G9717 DOC PT DX DEP/BP F/U NT REQ: HCPCS | Performed by: INTERNAL MEDICINE

## 2023-03-15 PROCEDURE — 85025 COMPLETE CBC W/AUTO DIFF WBC: CPT

## 2023-03-15 PROCEDURE — 3017F COLORECTAL CA SCREEN DOC REV: CPT | Performed by: INTERNAL MEDICINE

## 2023-03-15 PROCEDURE — 99214 OFFICE O/P EST MOD 30 MIN: CPT | Performed by: INTERNAL MEDICINE

## 2023-03-15 PROCEDURE — G8427 DOCREV CUR MEDS BY ELIG CLIN: HCPCS | Performed by: INTERNAL MEDICINE

## 2023-03-15 PROCEDURE — 71046 X-RAY EXAM CHEST 2 VIEWS: CPT

## 2023-03-15 PROCEDURE — 93005 ELECTROCARDIOGRAM TRACING: CPT

## 2023-03-15 PROCEDURE — 36415 COLL VENOUS BLD VENIPUNCTURE: CPT

## 2023-03-15 PROCEDURE — 85610 PROTHROMBIN TIME: CPT

## 2023-03-15 PROCEDURE — 80053 COMPREHEN METABOLIC PANEL: CPT

## 2023-03-15 PROCEDURE — 85730 THROMBOPLASTIN TIME PARTIAL: CPT

## 2023-03-15 PROCEDURE — 2022F DILAT RTA XM EVC RTNOPTHY: CPT | Performed by: INTERNAL MEDICINE

## 2023-03-15 RX ORDER — ALBUTEROL SULFATE 90 UG/1
AEROSOL, METERED RESPIRATORY (INHALATION)
Qty: 1 EACH | Refills: 5 | Status: SHIPPED | OUTPATIENT
Start: 2023-03-15

## 2023-03-15 RX ORDER — OMEPRAZOLE 40 MG/1
40 CAPSULE, DELAYED RELEASE ORAL DAILY
COMMUNITY
End: 2023-03-15 | Stop reason: SDUPTHER

## 2023-03-15 RX ORDER — MELATONIN: COMMUNITY

## 2023-03-15 RX ORDER — METOPROLOL SUCCINATE 25 MG/1
25 TABLET, EXTENDED RELEASE ORAL DAILY
Qty: 90 TABLET | Refills: 1 | Status: SHIPPED | OUTPATIENT
Start: 2023-03-15

## 2023-03-15 RX ORDER — ZOLPIDEM TARTRATE 5 MG/1
TABLET ORAL
COMMUNITY
Start: 2023-02-21

## 2023-03-15 RX ORDER — SEMAGLUTIDE 1.34 MG/ML
INJECTION, SOLUTION SUBCUTANEOUS
COMMUNITY
Start: 2023-02-15

## 2023-03-15 RX ORDER — CALCIUM CARBONATE 500(1250)
1 TABLET ORAL EVERY MORNING
COMMUNITY
Start: 2023-02-21

## 2023-03-15 RX ORDER — PIOGLITAZONEHYDROCHLORIDE 30 MG/1
30 TABLET ORAL
Qty: 90 TABLET | Refills: 1 | Status: SHIPPED | OUTPATIENT
Start: 2023-03-15

## 2023-03-15 RX ORDER — SERTRALINE HYDROCHLORIDE 50 MG/1
50 TABLET, FILM COATED ORAL DAILY
Qty: 90 TABLET | Refills: 1 | Status: SHIPPED | OUTPATIENT
Start: 2023-03-15 | End: 2023-03-15

## 2023-03-15 RX ORDER — BUDESONIDE AND FORMOTEROL FUMARATE DIHYDRATE 160; 4.5 UG/1; UG/1
2 AEROSOL RESPIRATORY (INHALATION) 2 TIMES DAILY
Qty: 10.2 G | Refills: 5 | Status: SHIPPED | OUTPATIENT
Start: 2023-03-15

## 2023-03-15 RX ORDER — TRIAMTERENE AND HYDROCHLOROTHIAZIDE 37.5; 25 MG/1; MG/1
1 CAPSULE ORAL EVERY MORNING
Qty: 90 CAPSULE | Refills: 1 | Status: SHIPPED | OUTPATIENT
Start: 2023-03-15

## 2023-03-15 RX ORDER — ROSUVASTATIN CALCIUM 20 MG/1
20 TABLET, COATED ORAL
COMMUNITY
Start: 2023-01-17 | End: 2023-03-15

## 2023-03-15 RX ORDER — OMEPRAZOLE 40 MG/1
40 CAPSULE, DELAYED RELEASE ORAL DAILY
Qty: 90 CAPSULE | Refills: 1 | Status: SHIPPED | OUTPATIENT
Start: 2023-03-15

## 2023-03-15 RX ORDER — MULTIVITAMIN
1 TABLET ORAL DAILY
COMMUNITY
End: 2023-03-15

## 2023-03-15 RX ORDER — MULTIVITAMIN
1 TABLET ORAL DAILY
Qty: 90 TABLET | Refills: 1 | Status: SHIPPED | OUTPATIENT
Start: 2023-03-15

## 2023-03-15 RX ORDER — SERTRALINE HYDROCHLORIDE 50 MG/1
TABLET, FILM COATED ORAL
COMMUNITY

## 2023-03-15 RX ORDER — MONTELUKAST SODIUM 10 MG/1
10 TABLET ORAL DAILY
Qty: 90 TABLET | Refills: 1 | Status: SHIPPED | OUTPATIENT
Start: 2023-03-15

## 2023-03-15 NOTE — PROGRESS NOTES
Valerie Gunter (: 1965) is a 62 y.o. female, established patient, here for evaluation of the following chief complaint(s):  Follow Up Chronic Condition         ASSESSMENT/PLAN:  Below is the assessment and plan developed based on review of pertinent history, physical exam, labs, studies, and medications. 1. Essential (primary) hypertension  -     metoprolol succinate (TOPROL-XL) 25 mg XL tablet; Take 1 Tablet by mouth daily. , Normal, Disp-90 Tablet, R-1  2. Uncontrolled type 2 diabetes mellitus with hyperglycemia (HCC)  -     AMB POC HEMOGLOBIN A1C  3. Chronic obstructive pulmonary disease, unspecified COPD type (HCC)  -     albuterol (PROVENTIL HFA, VENTOLIN HFA, PROAIR HFA) 90 mcg/actuation inhaler; INHALE 2 PUFFS BY MOUTH EVERY 4-6 HOURS AS FOR WHEEZING, Normal, Disp-1 Each, R-5  4. Mixed hyperlipidemia  5. Depressive disorder  6. Gastroesophageal reflux disease without esophagitis  7. Human immunodeficiency virus (HIV) disease ([facility] [address]  8. History of TIA (transient ischemic attack)  9. Ex-smoker  10. Long term (current) use of insulin ([facility] [address]  11. Insulin pump in place    Hypertension controlled continue triamterene HCTZ 37.5/25 mg once a day. Metoprolol ER 25 mg/day. Cannot take ACE or ARB. Type 2 diabetes mellitus uncontrolled started seeing excellent endocrinologist now she has been started on insulin pumps for last 2 months but she does not know still and she is getting basal insulin through the pump and getting bolus Humalog injection. Today hemoglobin A1c 7.6%. Taking Jardiance 25 mg once a day before breakfast and pioglitazone 30 mg once a day. She says Cristel Jiang is making her sick and she was told to leave it like that. Diabetic diet. Continue low-dose aspirin and statin. Mixed hyperlipidemia also on Biktarvy.   Taking atorvastatin 20 mg at bedtime she should not take atorvastatin and rosuvastatin that she had in [place] clarified    GERD she has omeprazole and pantoprazole in her back recommended to continue omeprazole and not to take pantoprazole. Depression taking sertraline. No negative thoughts suicidal thoughts homicidal thoughts    COPD ex-smoker taking albuterol and Symbicort. History of herpes simplex taking acyclovir. Follows ID specialist for her HIV labs are monitored by nurse practitioner Ms. Gillette at [facility]. Allergic rhinitis taking montelukast and fluticasone nasal spray. Her labs are monitored by endocrinologist and infectious disease specialist.  I reviewed in epic system. Refills given. Return in about 3 months (around 6/15/2023) for medicare wellness. SUBJECTIVE/OBJECTIVE:  HPI    Federica Xavier with pleasant personality came for regular follow-up. She has been on insulin pump. She also takes Humalog. She also takes Jardiance and pioglitazone. Today her hemoglobin A1c is 7.6% in the office. She cannot take ACE or ARB. 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 She says she is doing good. She has no flareup of COPD. She has quit her smoking since long. She says she has made changes in her diet. Recently she has done her lab work with consultants and at [facility]. She follows infectious disease specialist at [facility] taking [address] with compliance sometimes it causes nausea she takes ondansetron and she takes omeprazole. Medicine reconciliation done. She also uses glucometer. She needs refills. Going to have bunion surgery and left foot this month.     Allergies   Allergen Reactions    Latex Unknown (comments)    Lisinopril Swelling     SWELLING OF THE THROAT    Lisinopril Other (comments)     PT.  States causes swelling lf the throat    Bactrim [Sulfamethoprim] Hives    Elavil Hives    Iron Nausea and Vomiting    Lyrica [Pregabalin] Hives    Adhesive Rash    Sulfamethoxazole Unknown (comments)    Trimethoprim Unknown (comments)     Current Outpatient Medications   Medication Sig    Ozempic 0.25 mg or 0.5 mg/dose (2 mg/1.5 ml) subq pen INJECT 0.5 MG SUBCUTANEOUSLY WEEKLY    zolpidem (AMBIEN) 5 mg tablet TAKE ONE TABLET BY MOUTH AT BEDTIME AS NEEDED FOR SLEEP    calcium carbonate (OS-AYSHA) 500 mg calcium (1,250 mg) tablet Take 1 Tablet by mouth daily. multivitamin (Daily-Dex) tablet Take 1 Tablet by mouth daily. omeprazole (PRILOSEC) 40 mg capsule Take 1 Capsule by mouth daily. Take 30 minutes before breakfast once a day    triamterene-hydroCHLOROthiazide (DYAZIDE) 37.5-25 mg per capsule Take 1 Capsule by mouth Every morning. pioglitazone (ACTOS) 30 mg tablet Take 1 Tablet by mouth Daily (before breakfast). metoprolol succinate (TOPROL-XL) 25 mg XL tablet Take 1 Tablet by mouth daily. empagliflozin (JARDIANCE) 25 mg tablet Take 1 Tablet by mouth Daily (before breakfast). montelukast (SINGULAIR) 10 mg tablet Take 1 Tablet by mouth daily. albuterol (PROVENTIL HFA, VENTOLIN HFA, PROAIR HFA) 90 mcg/actuation inhaler INHALE 2 PUFFS BY MOUTH EVERY 4-6 HOURS AS FOR WHEEZING    sertraline (ZOLOFT) 50 mg tablet Take 1 Tablet by mouth daily. multivitamin (Daily-Dex) tablet Take 1 Tablet by mouth daily. budesonide-formoteroL (SYMBICORT) 160-4.5 mcg/actuation HFAA Take 2 Puffs by inhalation two (2) times a day. Insulin Needles, Disposable, 31 gauge x 5/16\" ndle To take insulin injection 4 times a dayE11.65    atorvastatin (LIPITOR) 20 mg tablet Take 1 Tablet by mouth daily. sucralfate (CARAFATE) 1 gram tablet Take 1 g by mouth four (4) times daily. fluticasone propionate (FLONASE) 50 mcg/actuation nasal spray 2 Sprays by Both Nostrils route daily. Indications: inflammation of the nose due to an allergy    ondansetron (ZOFRAN ODT) 4 mg disintegrating tablet Take 1 Tablet by mouth every eight (8) hours as needed for Nausea or Vomiting.     Symbicort 160-4.5 mcg/actuation HFAA Take 2 Puffs by inhalation two (2) times a day. cholecalciferol (Vitamin D3) 25 mcg (1,000 unit) cap Take 1 Capsule by mouth daily. lancets misc Check glucose 3 times a day, 90 days E11.65, Z 79.4    glucose blood VI test strips (OneTouch Verio test strips) strip Check glucose 3 times a day, 90 days E11.65, Z.79.4    Insulin Needles, Disposable, 31 gauge x 5/16\" ndle 4 times a day    acyclovir (ZOVIRAX) 400 mg tablet Take 1 Tab by mouth two (2) times a day. Biktarvy tab tablet Take 1 Tab by mouth daily. 50mg/200 mg/25 mg per day. aspirin delayed-release 81 mg tablet Take 1 Tab by mouth daily. glucose blood VI test strips (ASCENSIA AUTODISC VI, ONE TOUCH ULTRA TEST VI) strip to check blood sugar 3 times a day before each meal and in rotation being on insulin (Patient not taking: No sig reported)     No current facility-administered medications for this visit.      Past Medical History:   Diagnosis Date    Abnormal weight gain 9/8/2020    Abnormal weight gain 9/9/2020    Anemia     Anemia     Arthritis     Asthma     Cellulitis and abscess of lower extremity 9/9/2020    Chronic back pain 12/8/2020    Chronic kidney disease     Chronic obstructive pulmonary disease (HCC)     Chronic pain     Chronic sinusitis 9/8/2020    Contact dermatitis and eczema due to cause     COPD (chronic obstructive pulmonary disease) ([facility] [address] 9/9/2020    Depression     Depressive disorder 9/9/2020    Diabetes ([facility] [address]     DM (diabetes mellitus), type 2 ([address] 9/9/2020    DM (diabetes mellitus), type 2, uncontrolled 9/8/2020    Dyslipidemia 9/9/2020    Dysphagia 9/8/2020    Dysphagia 9/9/2020    Essential hypertension 9/8/2020    Essential hypertension 9/9/2020    GERD (gastroesophageal reflux disease) 9/8/2020    GERD (gastroesophageal reflux disease)     Headache     Hemorrhoids 9/8/2020    HIV (human immunodeficiency virus infection) ([facility] [address]     Hypercholesterolemia     Hypertension     Hypokalemia 9/8/2020    Insomnia 9/8/2020    Insomnia 9/9/2020 Mononeuritis 9/8/2020    Mononeuritis 9/9/2020    Neck pain 9/9/2020    Noncompliance with treatment 9/8/2020    Noncompliance with treatment 9/9/2020    Pain in throat 9/8/2020    Pain in throat 9/9/2020    Pruritus of skin 9/8/2020    TIA (transient ischemic attack) 9/8/2020    TIA (transient ischemic attack) 9/9/2020    TIA (transient ischemic attack) 9/9/2020    Tick bite 9/8/2020    Tick bite 9/9/2020    Tobacco dependence 9/8/2020    Vitamin D deficiency 9/8/2020    Vitamin D deficiency 9/9/2020     Past Surgical History:   Procedure Laterality Date    HX APPENDECTOMY      HX APPENDECTOMY  2012    HX COLONOSCOPY      HX COLONOSCOPY  06/25/2017    HX GI      HX GYN      HX HYSTERECTOMY      HX ORTHOPAEDIC      HX TOTAL LAPAROSCOPIC HYSTERECTOMY  2013    [place] UNLISTED PROCEDURE VASCULAR SURGERY       Family History   Problem Relation Age of Onset    Hypertension Mother     Thyroid Disease Mother     Heart Disease Father     Hypertension Father     Diabetes Father     Prostate Cancer Father     Diabetes Maternal Grandmother     Prostate Cancer Brother          Review of Systems   Constitutional: Negative. HENT:  Negative for nosebleeds, sinus pain and sore throat. Eyes:  Negative for blurred vision. Respiratory:  Negative for cough, sputum production, shortness of breath and wheezing. Cardiovascular: Negative. Gastrointestinal: Negative. Genitourinary: Negative. Musculoskeletal: Negative. Bunion on lt foot,   Neurological:  Negative for dizziness, tingling, sensory change and headaches. Endo/Heme/Allergies:  Negative for polydipsia. Does not bruise/bleed easily. Psychiatric/Behavioral: Negative. Stable for depression.        Vitals:    03/15/23 0922 03/15/23 0956   BP: 127/79 128/80   Pulse: 75 72   Resp: 18    Temp: 98.3 °F (36.8 °C)    TempSrc: Oral    SpO2: 99%    Weight: 195 lb 12.8 oz (88.8 kg)    Height: 5' 3\" (1.6 m)           Physical Exam  Vitals and nursing note reviewed. Constitutional:       General: She is not in acute distress. Appearance: Normal appearance. She is not ill-appearing or toxic-appearing. Eyes:      Pupils: Pupils are equal, round, and reactive to light. Cardiovascular:      Rate and Rhythm: Normal rate and regular rhythm. Pulses: Normal pulses. Heart sounds: Normal heart sounds. No murmur heard. Pulmonary:      Effort: Pulmonary effort is normal.      Breath sounds: Normal breath sounds. No rhonchi or rales. Abdominal:      General: Bowel sounds are normal. There is no distension. Palpations: Abdomen is soft. There is no mass. Tenderness: There is no abdominal tenderness. There is no guarding. Musculoskeletal:         General: No swelling or tenderness. Cervical back: Neck supple. Right lower leg: No edema. Left lower leg: No edema. Skin:     General: Skin is warm. Findings: No bruising. Neurological:      General: No focal deficit present. Mental Status: She is alert and oriented to person, place, and time. Cranial Nerves: No cranial nerve deficit. Motor: No weakness. Gait: Gait normal.   Psychiatric:         Mood and Affect: Mood normal.         Behavior: Behavior normal.     An electronic signature was used to authenticate this note.   -- Lilliam Pederson MD

## 2023-03-15 NOTE — PERIOP NOTES
[address] [facility] [address], [address] [facility]   MAIN OR                                  [phone]   MAIN PRE OP                          [phone]                                                                                AMBULATORY PRE OP          [phone]  PRE-ADMISSION TESTING    [phone]   Surgery Date: Wednesday 3/22/23       Is surgery arrival time given by surgeon? NO  If NO, [address] [facility] staff will call you between 3 and 7pm the day before your surgery with your arrival time. (If your surgery is on a Monday, we will call you the Friday before.)    Call [phone] after 7pm Monday-Friday if you did not receive this call. INSTRUCTIONS BEFORE YOUR SURGERY   When You  Arrive Arrive at the [address] [facility] on the day of your surgery  Have your insurance card, photo ID, and any copayment (if needed)   Food   and   Drink NO food or drink after midnight the night before surgery    This means NO water, gum, mints, coffee, juice, etc.  No alcohol (beer, wine, liquor) 24 hours before and after surgery   Medications to   TAKE   Morning of Surgery MEDICATIONS TO TAKE THE MORNING OF SURGERY WITH A SIP OF WATER:   ALBUTEROL IF NEEDED  SYMBICORT  FLONASE  METOPROLOL  MONTELUKAST  OMEPRAZOLE  ACYCLOVIR  BIKTARVY  CARAFATE     Medications  To  STOP      7 days before surgery Non-Steroidal anti-inflammatory Drugs (NSAID's): for example, Ibuprofen (Advil, Motrin), Naproxen (Aleve)  Aspirin, if taking for pain   Herbal supplements, vitamins, and fish oil  Other:FOLLOW DR Josselyn Stuart INSTRUCTIONS REGARDING STOPPING ASPIRIN  (Pain medications not listed above, including Tylenol may be taken)   Blood  Thinners If you take  Aspirin, Plavix, Coumadin, or any blood-thinning or anti-blood clot medicine, talk to the doctor who prescribed the medications for pre-operative instructions.    Bathing Clothing  Jewelry  Valuables     If you shower the morning of surgery, please do not apply anything to your skin (lotions, powders, deodorant, or makeup, especially mascara)  Follow Chlorhexidine [employer] body wash instructions provided to you during PAT appointment. Begin 3 days prior to surgery. Do not shave or trim anywhere 24 hours before surgery  Wear your hair loose or down; no pony-tails, buns, or metal hair clips  Wear loose, comfortable, clean clothes  Wear glasses instead of contacts  Leave money, valuables, and jewelry, including body piercings, at home  If you were given an Pivto, bring it on day of surgery. Going Home - or Spending the Night SAME-DAY SURGERY: You must have a responsible adult drive you home and stay with you 24 hours after surgery  ADMITS: If your doctor is keeping you in the hospital after surgery, leave personal belongings/luggage in your car until you have a hospital room number. Hospital discharge time is 12 noon  Drivers must be here before 12 noon unless you are told differently   Special Instructions FOLLOW INSTRUCTIONS BY DR Jagdish Foote GIVEN FOR INSULIN PUMP DAY OF SURGERY       Follow all instructions so your surgery wont be cancelled. Please, be on time. If a situation occurs and you are delayed the day of surgery, call [phone] or [phone]. If your physical condition changes (like a fever, cold, flu, etc.) call your surgeon. Home medication(s) reviewed and verified via   LIST   VERBAL   during PAT appointment. The patient was contacted by   IN-PERSON  The patient verbalizes understanding of all instructions and   DOES NOT   need reinforcement.

## 2023-03-15 NOTE — PERIOP NOTES
IVELISSE Finn MD  Cc: Melissa Gipson RN; Iraida Mae RN  Good afternoon,     Could you please provide instructions for ASA in the gigi-op period (surgery on 3/22)? We will notify patient.

## 2023-03-16 ENCOUNTER — TELEPHONE (OUTPATIENT)
Dept: INTERNAL MEDICINE CLINIC | Age: 58
End: 2023-03-16

## 2023-03-16 NOTE — TELEPHONE ENCOUNTER
IVELISSE Segundo MD  Cc: Scar Ambrocio RN; Vikas Narvaez RN  Good afternoon,     Could you please provide instructions for ASA in the gigi-op period (surgery on 3/22)? We will notify patient.

## 2023-03-16 NOTE — PROGRESS NOTES
Preadmission EKG.   I am not sure what kind of surgery she has not a good historian whether it is a bunion surgery but she did not tell me the exact date in today visit

## 2023-03-16 NOTE — TELEPHONE ENCOUNTER
She is recommended to stop aspirin 81 mg 5 to 7 days before surgery. She does not have a history of recent stent and no recent history of CVA. She can resume back aspirin 81 mg 24 to 48 hours after surgery if there is no bleeding. Can you just let me know what kind of surgery she is going and surgeon who is doing surgery.

## 2023-03-21 ENCOUNTER — ANESTHESIA EVENT (OUTPATIENT)
Dept: SURGERY | Age: 58
End: 2023-03-21
Payer: MEDICARE

## 2023-03-22 ENCOUNTER — ANESTHESIA (OUTPATIENT)
Dept: SURGERY | Age: 58
End: 2023-03-22
Payer: MEDICARE

## 2023-03-22 ENCOUNTER — HOSPITAL ENCOUNTER (OUTPATIENT)
Age: 58
Setting detail: OUTPATIENT SURGERY
Discharge: HOME | End: 2023-03-22
Attending: PODIATRIST | Admitting: PODIATRIST
Payer: MEDICARE

## 2023-03-22 ENCOUNTER — APPOINTMENT (OUTPATIENT)
Dept: GENERAL RADIOLOGY | Age: 58
End: 2023-03-22
Attending: PODIATRIST
Payer: MEDICARE

## 2023-03-22 VITALS
SYSTOLIC BLOOD PRESSURE: 113 MMHG | DIASTOLIC BLOOD PRESSURE: 80 MMHG | HEIGHT: 63 IN | BODY MASS INDEX: 33.98 KG/M2 | RESPIRATION RATE: 15 BRPM | TEMPERATURE: 97.6 F | HEART RATE: 93 BPM | WEIGHT: 191.8 LBS | OXYGEN SATURATION: 95 %

## 2023-03-22 DIAGNOSIS — G89.18 POST-OP PAIN: Primary | ICD-10-CM

## 2023-03-22 LAB
GLUCOSE BLD STRIP.AUTO-MCNC: 154 MG/DL (ref 65–117)
GLUCOSE BLD STRIP.AUTO-MCNC: 187 MG/DL (ref 65–117)
SERVICE CMNT-IMP: ABNORMAL
SERVICE CMNT-IMP: ABNORMAL

## 2023-03-22 PROCEDURE — C1713 ANCHOR/SCREW BN/BN,TIS/BN: HCPCS | Performed by: PODIATRIST

## 2023-03-22 PROCEDURE — 76060000064 HC AMB SURG ANES 2 TO 2.5 HR: Performed by: PODIATRIST

## 2023-03-22 PROCEDURE — 77030040361 HC SLV COMPR DVT MDII -B

## 2023-03-22 PROCEDURE — 74011250637 HC RX REV CODE- 250/637: Performed by: ANESTHESIOLOGY

## 2023-03-22 PROCEDURE — 76030000004 HC AMB SURG OR TIME 2 TO 2.5: Performed by: PODIATRIST

## 2023-03-22 PROCEDURE — 76210000046 HC AMBSU PH II REC FIRST 0.5 HR: Performed by: PODIATRIST

## 2023-03-22 PROCEDURE — 77030037938 HC BLD OSC SAW TREA -B: Performed by: PODIATRIST

## 2023-03-22 PROCEDURE — 77030020274 HC MISC IMPL ORTHOPEDIC: Performed by: PODIATRIST

## 2023-03-22 PROCEDURE — 82962 GLUCOSE BLOOD TEST: CPT

## 2023-03-22 PROCEDURE — 77030040922 HC BLNKT HYPOTHRM STRY -A

## 2023-03-22 PROCEDURE — 74011250636 HC RX REV CODE- 250/636: Performed by: NURSE ANESTHETIST, CERTIFIED REGISTERED

## 2023-03-22 PROCEDURE — 77030003601 HC NDL NRV BLK BBMI -A: Performed by: ANESTHESIOLOGY

## 2023-03-22 PROCEDURE — 77030031139 HC SUT VCRL2 J&J -A: Performed by: PODIATRIST

## 2023-03-22 PROCEDURE — 74011250636 HC RX REV CODE- 250/636: Performed by: ANESTHESIOLOGY

## 2023-03-22 PROCEDURE — 77030020269 HC MISC IMPL: Performed by: PODIATRIST

## 2023-03-22 PROCEDURE — 74011250636 HC RX REV CODE- 250/636: Performed by: PODIATRIST

## 2023-03-22 PROCEDURE — 77030000032 HC CUF TRNQT ZIMM -B: Performed by: PODIATRIST

## 2023-03-22 PROCEDURE — 2709999900 HC NON-CHARGEABLE SUPPLY: Performed by: PODIATRIST

## 2023-03-22 PROCEDURE — 74011000250 HC RX REV CODE- 250: Performed by: PODIATRIST

## 2023-03-22 PROCEDURE — 76210000034 HC AMBSU PH I REC 0.5 TO 1 HR: Performed by: PODIATRIST

## 2023-03-22 PROCEDURE — 77030002933 HC SUT MCRYL J&J -A: Performed by: PODIATRIST

## 2023-03-22 DEVICE — LOCKING SCREWS
Type: IMPLANTABLE DEVICE | Site: FOOT | Status: FUNCTIONAL
Brand: FASTPITCH 2.7MM HIGH PITCH LOCKING SCREW

## 2023-03-22 DEVICE — IMPLANTABLE DEVICE
Type: IMPLANTABLE DEVICE | Site: FOOT | Status: FUNCTIONAL
Brand: FUSEFORCE

## 2023-03-22 DEVICE — I-FACTOR PUTTY, 2.5CC SYRINGE
Type: IMPLANTABLE DEVICE | Site: FOOT | Status: FUNCTIONAL
Brand: I-FACTOR PEPTIDE ENHANCED BONE GRAFT

## 2023-03-22 DEVICE — GRAFT HUM TISS AMBIENT 2 CC FLOWABLE PLCNTA TISS VIAFLOW: Type: IMPLANTABLE DEVICE | Site: FOOT | Status: FUNCTIONAL

## 2023-03-22 DEVICE — 2.7MM HIGH PITCH LOCKING SCREW - 12MM/14MM
Type: IMPLANTABLE DEVICE | Site: FOOT | Status: FUNCTIONAL
Brand: FASTPITCH

## 2023-03-22 RX ORDER — ACETAMINOPHEN 325 MG/1
650 TABLET ORAL ONCE
Status: COMPLETED | OUTPATIENT
Start: 2023-03-22 | End: 2023-03-22

## 2023-03-22 RX ORDER — FENTANYL CITRATE 50 UG/ML
INJECTION, SOLUTION INTRAMUSCULAR; INTRAVENOUS AS NEEDED
Status: DISCONTINUED | OUTPATIENT
Start: 2023-03-22 | End: 2023-03-22 | Stop reason: HOSPADM

## 2023-03-22 RX ORDER — OXYCODONE AND ACETAMINOPHEN 5; 325 MG/1; MG/1
2 TABLET ORAL
Qty: 30 TABLET | Refills: 0 | Status: SHIPPED | OUTPATIENT
Start: 2023-03-22 | End: 2023-03-25

## 2023-03-22 RX ORDER — HYDROMORPHONE HYDROCHLORIDE 1 MG/ML
0.5 INJECTION, SOLUTION INTRAMUSCULAR; INTRAVENOUS; SUBCUTANEOUS
Status: DISCONTINUED | OUTPATIENT
Start: 2023-03-22 | End: 2023-03-22 | Stop reason: HOSPADM

## 2023-03-22 RX ORDER — DIPHENHYDRAMINE HYDROCHLORIDE 50 MG/ML
12.5 INJECTION, SOLUTION INTRAMUSCULAR; INTRAVENOUS AS NEEDED
Status: DISCONTINUED | OUTPATIENT
Start: 2023-03-22 | End: 2023-03-22 | Stop reason: HOSPADM

## 2023-03-22 RX ORDER — ONDANSETRON 2 MG/ML
4 INJECTION INTRAMUSCULAR; INTRAVENOUS AS NEEDED
Status: DISCONTINUED | OUTPATIENT
Start: 2023-03-22 | End: 2023-03-22 | Stop reason: HOSPADM

## 2023-03-22 RX ORDER — MIDAZOLAM HYDROCHLORIDE 1 MG/ML
INJECTION, SOLUTION INTRAMUSCULAR; INTRAVENOUS AS NEEDED
Status: DISCONTINUED | OUTPATIENT
Start: 2023-03-22 | End: 2023-03-22 | Stop reason: HOSPADM

## 2023-03-22 RX ORDER — PROPOFOL 10 MG/ML
INJECTION, EMULSION INTRAVENOUS
Status: DISCONTINUED | OUTPATIENT
Start: 2023-03-22 | End: 2023-03-22 | Stop reason: HOSPADM

## 2023-03-22 RX ORDER — ROPIVACAINE HYDROCHLORIDE 5 MG/ML
INJECTION, SOLUTION EPIDURAL; INFILTRATION; PERINEURAL AS NEEDED
Status: DISCONTINUED | OUTPATIENT
Start: 2023-03-22 | End: 2023-03-22 | Stop reason: HOSPADM

## 2023-03-22 RX ORDER — SODIUM CHLORIDE, SODIUM LACTATE, POTASSIUM CHLORIDE, CALCIUM CHLORIDE 600; 310; 30; 20 MG/100ML; MG/100ML; MG/100ML; MG/100ML
INJECTION, SOLUTION INTRAVENOUS
Status: DISCONTINUED | OUTPATIENT
Start: 2023-03-22 | End: 2023-03-22 | Stop reason: HOSPADM

## 2023-03-22 RX ORDER — SODIUM CHLORIDE, SODIUM LACTATE, POTASSIUM CHLORIDE, CALCIUM CHLORIDE 600; 310; 30; 20 MG/100ML; MG/100ML; MG/100ML; MG/100ML
150 INJECTION, SOLUTION INTRAVENOUS CONTINUOUS
Status: DISCONTINUED | OUTPATIENT
Start: 2023-03-22 | End: 2023-03-22 | Stop reason: HOSPADM

## 2023-03-22 RX ORDER — SODIUM CHLORIDE, SODIUM LACTATE, POTASSIUM CHLORIDE, CALCIUM CHLORIDE 600; 310; 30; 20 MG/100ML; MG/100ML; MG/100ML; MG/100ML
125 INJECTION, SOLUTION INTRAVENOUS CONTINUOUS
Status: DISCONTINUED | OUTPATIENT
Start: 2023-03-22 | End: 2023-03-22 | Stop reason: HOSPADM

## 2023-03-22 RX ORDER — ALBUTEROL SULFATE 0.83 MG/ML
2.5 SOLUTION RESPIRATORY (INHALATION) AS NEEDED
Status: DISCONTINUED | OUTPATIENT
Start: 2023-03-22 | End: 2023-03-22 | Stop reason: HOSPADM

## 2023-03-22 RX ORDER — LIDOCAINE HYDROCHLORIDE 10 MG/ML
0.1 INJECTION, SOLUTION EPIDURAL; INFILTRATION; INTRACAUDAL; PERINEURAL AS NEEDED
Status: DISCONTINUED | OUTPATIENT
Start: 2023-03-22 | End: 2023-03-22 | Stop reason: HOSPADM

## 2023-03-22 RX ADMIN — ACETAMINOPHEN 650 MG: 325 TABLET ORAL at 07:08

## 2023-03-22 RX ADMIN — FENTANYL CITRATE 50 MCG: 50 INJECTION, SOLUTION INTRAMUSCULAR; INTRAVENOUS at 07:22

## 2023-03-22 RX ADMIN — ROPIVACAINE HYDROCHLORIDE 10 ML: 5 INJECTION, SOLUTION EPIDURAL; INFILTRATION; PERINEURAL at 07:30

## 2023-03-22 RX ADMIN — PROPOFOL 50 MCG/KG/MIN: 10 INJECTION, EMULSION INTRAVENOUS at 07:43

## 2023-03-22 RX ADMIN — FENTANYL CITRATE 50 MCG: 50 INJECTION, SOLUTION INTRAMUSCULAR; INTRAVENOUS at 07:20

## 2023-03-22 RX ADMIN — SODIUM CHLORIDE, POTASSIUM CHLORIDE, SODIUM LACTATE AND CALCIUM CHLORIDE 150 ML/HR: 600; 310; 30; 20 INJECTION, SOLUTION INTRAVENOUS at 07:09

## 2023-03-22 RX ADMIN — PROPOFOL 50 MG: 10 INJECTION, EMULSION INTRAVENOUS at 07:42

## 2023-03-22 RX ADMIN — MIDAZOLAM HYDROCHLORIDE 2 MG: 1 INJECTION, SOLUTION INTRAMUSCULAR; INTRAVENOUS at 07:20

## 2023-03-22 RX ADMIN — SODIUM CHLORIDE, SODIUM LACTATE, POTASSIUM CHLORIDE, AND CALCIUM CHLORIDE: 600; 310; 30; 20 INJECTION, SOLUTION INTRAVENOUS at 07:22

## 2023-03-22 RX ADMIN — CEFAZOLIN SODIUM 2 G: 1 POWDER, FOR SOLUTION INTRAMUSCULAR; INTRAVENOUS at 07:46

## 2023-03-22 RX ADMIN — ROPIVACAINE HYDROCHLORIDE 30 ML: 5 INJECTION, SOLUTION EPIDURAL; INFILTRATION; PERINEURAL at 07:26

## 2023-03-22 NOTE — DISCHARGE INSTRUCTIONS
Keep dressing dry, clean, and intact  Non weightbearing to left lower extremity   Elevate left lower extremity at all times  Ice behind knee as needed  Text Dr. Robin Suarez [phone] with questions/concerns  DISCHARGE SUMMARY from your Nurse    The following personal items collected during your admission are returned to you:   Dental Appliance:    Vision:    Hearing Aid:    Jewelry: Jewelry: None  Clothing: Clothing: Footwear, Pants, Shirt, Undergarments, Socks  Other Valuables: Other Valuables: Eyeglasses  Valuables sent to safe:      PATIENT INSTRUCTIONS:    After general anesthesia or intravenous sedation, for 24 hours or while taking prescription Narcotics:  Limit your activities  Do not drive and operate hazardous machinery  Do not make important personal or business decisions  Do  not drink alcoholic beverages  If you have not urinated within 8 hours after discharge, please contact your surgeon on call. Report the following to your surgeon:  Excessive pain, swelling, redness or odor of or around the surgical area  Temperature over 100.5  Nausea and vomiting lasting longer than 4 hours or if unable to take medications  Any signs of decreased circulation or nerve impairment to extremity: change in color, persistent  numbness, tingling, coldness or increase pain  Any questions    COUGH AND DEEP BREATHE    Breathing deep and coughing are very important exercises to do after surgery. Deep breathing and coughing open the little air tubes and air sacks in your lungs. You take deep breaths every day. You may not even notice - it is just something you do when you sigh or yawn. It is a natural exercise you do to keep these air passages open. After surgery, take deep breaths and cough, on purpose. Coughing and deep breathing help prevent bronchitis and pneumonia after surgery. If you had chest or belly surgery, use a pillow as a \"hug buddy\" and hold it tightly to your chest or belly when you cough. DIRECTIONS:  Take 10 to 15 slow deep breaths every hour while awake. Breathe in deeply, and hold it for 2 seconds. Exhale slowly through puckered lips, like blowing up a balloon. After every 4th or 5th deep breath, hug your pillow to your chest or belly and give a hard, deep cough. Yes, it will probably hurt. But doing this exercise is very important part of healing after surgery. Take your pain medicine to help you do this exercise without too much pain. IF YOU HAVE BEEN DIAGNOSED WITH SLEEP APNEA, PLEASE USE YOUR SLEEP APNEA DEVICE OR CPAP MACHINE WHEN YOU INTEND TO NAP AFTER TAKING PAIN MEDICATION. Ankle Pumps    Ankle pumps increase the circulation of oxygenated blood to your lower extremities and decrease your risk for circulation problems such as blood clots. They also stretch the muscles, tendons and ligaments in your foot and ankle, and prevent joint contracture in the ankle and foot, especially after surgeries on the legs. It is important to do ankle pump exercises regularly after surgery because immobility increases your risk for developing a blood clot. Your doctor may also have you take an Aspirin for the next few days as well. If your doctor did not ask you to take an Aspirin, consult with him before starting Aspirin therapy on your own. Slowly point your foot forward, feeling the muscles on the top of your lower leg stretch, and hold this position for 5 seconds. Next, pull your foot back toward you as far as possible, stretching the calf muscles, and hold that position for 5 seconds. Repeat with the other foot. Perform 10 repetitions every hour while awake for both ankles if possible (down and then up with the foot once is one repetition). You should feel gentle stretching of the muscles in your lower leg when doing this exercise. If you feel pain, or your range of motion is limited, don't  Push too hard.   Only go the limit your joint and muscles will let you go. If you have increasing pain, progressively worsening leg warmth or swelling, STOP the exercise and call your doctor. Below is information about the medications your doctor is prescribing after your visit:    Other information in your discharge envelope:  []     PRESCRIPTIONS  []     SCHOOL/WORK NOTE  []     INFECTION PREVENTION  []     [address]  []     REGIONAL NERVE BLOCK ON QUE PAMPHLET   []     EXPAREL  []     HANDICAP APPLICATION         These are general instructions for a healthy lifestyle:    *  Please give a list of your current medications to your Primary Care Provider. *  Please update this list whenever your medications are discontinued, doses are      changed, or new medications (including over-the-counter products) are added. *  Please carry medication information at all times in case of emergency situations. About Smoking  No smoking / No tobacco products / Avoid exposure to second hand smoke    Surgeon General's Warning:  Quitting smoking now greatly reduces serious risk to your health. Obesity, smoking, and sedentary lifestyle greatly increases your risk for illness and disease. A healthy diet, regular physical exercise & weight monitoring are important for maintaining a healthy lifestyle. Congestive Heart Failure  You may be retaining fluid if you have a history of heart failure or if you experience any of the following symptoms:  Weight gain of 3 pounds or more overnight or 5 pounds in a week, increased swelling in our hands or feet or shortness of breath while lying flat in bed. Please call your doctor as soon as you notice any of these symptoms; do not wait until your next office visit.     Recognize signs and symptoms of STROKE:  F - face looks uneven  A - arms unable to move or move even  S - speech slurred or non-existent  T - time-call 911 as soon as signs and symptoms begin-DO NOT go         Back to bed or wait to see if you get better-TIME IS BRAIN. Warning signs of HEART ATTACK  Call 911 if you have these symptoms    Chest discomfort. Most heart attacks involve discomfort in the center of the chest that lasts more than a few minutes, or that goes away and comes back. It can feel like uncomfortable pressure, squeezing, fullness, or pain. Discomfort in other areas of the upper body. Symptoms can include pain or discomfort in one or both        Arms, the back, neck, jaw, or stomach. Shortness of breath with or without chest discomfort. Other signs may include breaking out in a cold sweat, nausea, or lightheadedness    Don't wait more than five minutes to call 911 - MINUTES MATTER! Fast action can save your life. Calling 911 is almost always the fastest way to get lifesaving treatment. Emergency Medical Services staff can begin treatment when they arrive - up to an hour sooner than if someone gets to the hospital by car. [facility] MEDICATION AND SIDE EFFECT GUIDE    The [facility] MEDICATION AND SIDE EFFECT GUIDE was provided to the PATIENT AND CARE PROVIDER. Information provided includes instruction about drug purpose and common side effects for the following medications:                Going Home After A  Peripheral Nerve Block    Patient Information    The anesthesiology team has provided for your pain control through a technique called regional anesthesia. As the name implies, anesthesia (decreased or no pain, sensation, or motor control) has been provided to a specific region, whether that be an arm or a leg. How does this happen?  you might ask.     While you were sleepy, the anesthesia provider provided medicine to a specific group of nerves either in the neck/shoulder region or in the groin and/or buttock region, similar to the way a dentist might numb a tooth (teeth.)  They typically use an ultrasound machine to know where the medicine is placed in relation to the nerves they wish to numb up or block.   What this means is that for the next few hours, you should expect to have a numb limb. Below are some things we wish for you to read and be familiar with concerning your anesthetized limb. Caring For a Blocked Body Part    General Considerations: The numbness may last up to 24 hours  You must protect your arm or leg. The blocked extremity is numb, weak, and difficult for your brain to locate and communicate with. To do this you should:  Pay attention to the position of the blocked limb at all times. Be very careful when placing hot or cod items on a numb limb. You could cause tissue damage like burns or frostbite if you are unable to feel temperature. Carefully follow your discharge instructions regarding the use of these therapies in you post-operative care. Carefully pad the affected limb. Normally we continually move and adjust the position of our bodies without thinking about it. This movement and continuous repositioning helps to prevent injuries from immobility. When a limb is numb it still requires this care  Be extremely careful not to bump or hit the numb body part. This can result in an unrecognized injury, at lease until the blocked limb wakes up. It can also result in worse pain of your already post-surgical limb. Arm/Shoulder Blocks: You may experience a droopy eyelid, nasal stuffiness, and redness of the eye after receiving an arm/shoulder block. This is called Annabelles Syndrome, and is very common. There is no need for concern. You may also experience mild hoarseness, but all of these symptoms should resolve within 24 hours. Some patients may experience mild shortness of breath. A sitting position will help alleviate this and it should resolve within 24 hours. If you experience significant or progressive worsening of the shortness of breath, seek medical attention immediately.     Knee/Foot Blocks:  DO NOT use the blocked leg to walk, balance or support yourself. Your leg will not be able to balance your weight properly while any part of your leg is numb. You are at a RISK for [address]. Be careful not to drag your foot along the ground or stub your toes. Contact Phone Numbers    CALL 911 IN CASE OF AN EMERGENCY. For all other non-emergency inquiries call the [facility]  at [phone] and ask for the anesthesiologist on call to be paged.

## 2023-03-22 NOTE — DISCHARGE SUMMARY
Admit date: 3/22/2023   Admitting Provider: Denton Wooten DPM    Discharge date: 3/22/2023  Discharging Provider: Denton Wooten DPM      * Admission Diagnoses: [address]    * Discharge Diagnoses:    Hospital Problems as of 3/22/2023 Date Reviewed: 3/15/2023   None        Procedure(s):  LEFT FOOT LAPIDUS BUNIONECTOMY, POSSIBLE LEFT FOOT AKIN OSTEOMTOMY  (MAC WITH POP W/SAPHENOUS)      Consults: None    Significant Diagnostic Studies: radiology: X-Ray: Intraop    Discharge Exam:  Visit Vitals  /89 (BP 1 Location: Left arm, BP Patient Position: At rest)   Pulse 87   Temp 98.7 °F (37.1 °C)   Resp 17   Ht 5' 3\" (1.6 m)   Wt 87 kg (191 lb 12.8 oz)   SpO2 98%   BMI 33.98 kg/m²     General appearance: alert, cooperative, no distress, appears stated age  Lungs: clear to auscultation bilaterally  Heart: regular rate and rhythm, S1, S2 normal, no murmur, click, rub or gallop  Extremities: normal post op swelling    * Discharge Condition: good  * Disposition: Home    Discharge Medications:  Current Discharge Medication List        CONTINUE these medications which have NOT CHANGED    Details   calcium carbonate (OS-AYSHA) 500 mg calcium (1,250 mg) tablet Take 1 Tablet by mouth Every morning. omeprazole (PRILOSEC) 40 mg capsule Take 1 Capsule by mouth daily. Take 30 minutes before breakfast once a day  Qty: 90 Capsule, Refills: 1      triamterene-hydroCHLOROthiazide (DYAZIDE) 37.5-25 mg per capsule Take 1 Capsule by mouth Every morning. Qty: 90 Capsule, Refills: 1      pioglitazone (ACTOS) 30 mg tablet Take 1 Tablet by mouth Daily (before breakfast). Qty: 90 Tablet, Refills: 1      metoprolol succinate (TOPROL-XL) 25 mg XL tablet Take 1 Tablet by mouth daily. Qty: 90 Tablet, Refills: 1    Associated Diagnoses: Essential (primary) hypertension      empagliflozin (JARDIANCE) 25 mg tablet Take 1 Tablet by mouth Daily (before breakfast).   Qty: 90 Tablet, Refills: 1      montelukast (SINGULAIR) 10 mg tablet Take 1 Tablet by mouth daily. Qty: 90 Tablet, Refills: 1      albuterol (PROVENTIL HFA, VENTOLIN HFA, PROAIR HFA) 90 mcg/actuation inhaler INHALE 2 PUFFS BY MOUTH EVERY 4-6 HOURS AS FOR WHEEZING  Qty: 1 Each, Refills: 5    Associated Diagnoses: Chronic obstructive pulmonary disease, unspecified COPD type (HCC)      multivitamin (Daily-Dex) tablet Take 1 Tablet by mouth daily. Qty: 90 Tablet, Refills: 1      budesonide-formoteroL (SYMBICORT) 160-4.5 mcg/actuation HFAA Take 2 Puffs by inhalation two (2) times a day. Qty: 10.2 g, Refills: 5      cholecalciferol (VITAMIN D3) (1000 Units /25 mcg) tablet Take  by mouth nightly. atorvastatin (LIPITOR) 20 mg tablet Take 1 Tablet by mouth daily. Qty: 90 Tablet, Refills: 1      sucralfate (CARAFATE) 1 gram tablet Take 1 g by mouth four (4) times daily. ondansetron (ZOFRAN ODT) 4 mg disintegrating tablet Take 1 Tablet by mouth every eight (8) hours as needed for Nausea or Vomiting. Qty: 15 Tablet, Refills: 0      acyclovir (ZOVIRAX) 400 mg tablet Take 1 Tab by mouth two (2) times a day. Biktarvy tab tablet Take 1 Tablet by mouth Every morning. 50mg/200 mg/25 mg per day. Ozempic 0.25 mg or 0.5 mg/dose (2 mg/1.5 ml) subq pen INJECT 0.5 MG SUBCUTANEOUSLY WEEKLY      zolpidem (AMBIEN) 5 mg tablet TAKE ONE TABLET BY MOUTH AT BEDTIME AS NEEDED FOR SLEEP      sertraline (ZOLOFT) 50 mg tablet Take  by mouth nightly. aspirin delayed-release 81 mg tablet Take 1 Tablet by mouth Every morning. * Follow-up Care/Patient Instructions:   Activity: Strict non-weightbearing to left lower extremity  Diet: Regular Diet  Wound Care: Keep dressing dry, clean, and intact    Follow-up Information       Follow up With Specialties Details Why Contact Info    Von Lara MD Internal Medicine Physician   [facility],[address], [facility]  [phone]              Signed:  Octavio Beckwith DPM  2/77/6666  9:26 AM

## 2023-03-22 NOTE — ANESTHESIA POSTPROCEDURE EVALUATION
Procedure(s):  LEFT FOOT LAPIDUS BUNIONECTOMY, POSSIBLE LEFT FOOT AKIN OSTEOMTOMY  (MAC WITH POP W/SAPHENOUS). MAC    Anesthesia Post Evaluation      Multimodal analgesia: multimodal analgesia used between 6 hours prior to anesthesia start to PACU discharge  Patient location during evaluation: PACU  Patient participation: complete - patient participated  Level of consciousness: awake and alert  Pain score: 0  Pain management: adequate  Airway patency: patent  Anesthetic complications: no  Cardiovascular status: hemodynamically stable and acceptable  Respiratory status: acceptable  Hydration status: acceptable  Comments: Patient seen and evaluated; no concerns.   Post anesthesia nausea and vomiting:  none  Final Post Anesthesia Temperature Assessment:  Normothermia (36.0-37.5 degrees C)      INITIAL Post-op Vital signs:   Vitals Value Taken Time   /80 03/22/23 1037   Temp 36.4 °C (97.6 °F) 03/22/23 0946   Pulse 93 03/22/23 1037   Resp 15 03/22/23 1037   SpO2 95 % 03/22/23 1037

## 2023-03-22 NOTE — ANESTHESIA PROCEDURE NOTES
Peripheral Block    Start time: 3/22/2023 7:20 AM  End time: 3/22/2023 7:30 AM  Performed by: Subhash Flor DO  Authorized by: Subhash Flor DO       Pre-procedure: Indications: at surgeon's request and post-op pain management    Preanesthetic Checklist: patient identified, risks and benefits discussed, site marked, timeout performed, anesthesia consent given, patient being monitored and fire risk safety assessment completed and verbalized    Timeout Time: 07:20 EDT      Block Type:   Block Type:  Saphenous and popliteal  Laterality:  Left  Monitoring:  Continuous pulse ox, frequent vital sign checks, heart rate, responsive to questions and oxygen  Injection Technique:  Single shot  Procedures: ultrasound guided and nerve stimulator    Patient Position: supine  Prep: chlorhexidine    Needle Type:  Stimuplex  Needle Gauge:  21 G  Needle Localization:  Nerve stimulator and ultrasound guidance  Med Admin Time: 3/22/2023 7:30 AM    Assessment:  Number of attempts:  1  Injection Assessment:  Incremental injection every 5 mL, local visualized surrounding nerve on ultrasound, negative aspiration for blood, no paresthesia and no intravascular symptoms  Patient tolerance:  Patient tolerated the procedure well with no immediate complications  Saphenous block performed with ultrasound guidance; 4\" stimuplex 21g needle used, 10cc 0.5% ropivacaine injected slowly with intermittent aspiration. Whit Thomas RN witnessed timeout and block written on correct side.

## 2023-03-22 NOTE — H&P
Surgery History and Physcial    Subjective:      Garfield Martin is a 62 y.o. female who presents for evaluation of left foot pain from chronic hallux valgus deformity. Patient failed conservative treatment.    Patient Active Problem List    Diagnosis Date Noted    Insulin pump in place 03/15/2023    Ex-smoker 11/15/2022    Hx of seasonal allergies 04/21/2022    Essential (primary) hypertension 04/21/2022    Human immunodeficiency virus (HIV) disease ([facility] 04/21/2022    Sleep apnea 04/21/2022    Long term (current) use of insulin ([address] 02/12/2022    History of TIA (transient ischemic attack) 12/11/2021    Mixed hyperlipidemia 10/27/2021    Type 2 diabetes mellitus with hyperglycemia, with long-term current use of insulin ([address] 09/15/2021    Stage 3a chronic kidney disease ([address] 09/15/2021    History of angioedema 09/15/2021    History of renal insufficiency 08/25/2021    Menopausal flushing 12/08/2020    Recurrent genital herpes simplex 12/08/2020    Uncontrolled type 2 diabetes mellitus with hyperglycemia ([address] 10/09/2020    Allergic rhinitis, unspecified seasonality, unspecified trigger 10/09/2020    Chronic obstructive pulmonary disease, unspecified COPD type ([address] 10/09/2020    Diarrhea 09/10/2020    Dysphagia 09/09/2020    Essential hypertension 09/09/2020    TIA (transient ischemic attack) 09/09/2020    Insomnia 09/09/2020    Neck pain 09/09/2020    Noncompliance with treatment 09/09/2020    Anemia 09/08/2020    Depressive disorder 09/08/2020    GERD (gastroesophageal reflux disease) 09/08/2020    Hypokalemia 09/08/2020    Chronic sinusitis 09/08/2020     Past Medical History:   Diagnosis Date    Abnormal weight gain 09/09/2020    Anemia     Arthritis     Asthma     Cellulitis and abscess of lower extremity 09/09/2020    Chronic back pain 12/08/2020    Chronic kidney disease     Chronic pain     Chronic sinusitis 09/08/2020    Contact dermatitis and eczema due to cause     COPD (chronic obstructive pulmonary disease) ([facility] 2020    Depression     Diabetes ([facility]     DM (diabetes mellitus), type 2, uncontrolled 2020    Dyslipidemia 2020    Dysphagia 2020    Essential hypertension 2020    GERD (gastroesophageal reflux disease) 2020    Headache     Hemorrhoids 2020    HIV (human immunodeficiency virus infection) ([facility]     Hypertension     Hypokalemia 2020    Insomnia 2020    Mononeuritis 2020    Neck pain 2020    Noncompliance with treatment 2020    Pain in throat 2020    Pruritus of skin 2020    TIA (transient ischemic attack) 2020    3/2020    Tick bite 2020    Tobacco dependence 2020    Vitamin D deficiency 2020      Past Surgical History:   Procedure Laterality Date    HX APPENDECTOMY      HX COLONOSCOPY  2017    HX GI  2014    hernia repair    HX HYSTERECTOMY      OR UNLISTED PROCEDURE VASCULAR SURGERY      cardiac cath? Social History     Tobacco Use    Smoking status: Former     Packs/day: 1.00     Years: 15.00     Pack years: 15.00     Types: Cigarettes     Quit date:      Years since quittin.2    Smokeless tobacco: Never   Substance Use Topics    Alcohol use: Not Currently      Family History   Problem Relation Age of Onset    Hypertension Mother     Thyroid Disease Mother     Heart Disease Father     Hypertension Father     Diabetes Father     Prostate Cancer Father     Diabetes Maternal Grandmother     Prostate Cancer Brother       Prior to Admission medications    Medication Sig Start Date End Date Taking? Authorizing Provider   calcium carbonate (OS-AYSHA) 500 mg calcium (1,250 mg) tablet Take 1 Tablet by mouth Every morning. 23  Yes Provider, Historical   omeprazole (PRILOSEC) 40 mg capsule Take 1 Capsule by mouth daily. Take 30 minutes before breakfast once a day  Patient taking differently: Take 40 mg by mouth Every morning.  Take 30 minutes before breakfast once a day 3/15/23  Yes Lala Edgar MD   triamterene-hydroCHLOROthiazide (DYAZIDE) 37.5-25 mg per capsule Take 1 Capsule by mouth Every morning. 3/15/23  Yes Lala Edgar MD   pioglitazone (ACTOS) 30 mg tablet Take 1 Tablet by mouth Daily (before breakfast). 3/15/23  Yes Lala Edgar MD   metoprolol succinate (TOPROL-XL) 25 mg XL tablet Take 1 Tablet by mouth daily. Patient taking differently: Take 25 mg by mouth Every morning. 3/15/23  Yes Lala Edgar MD   empagliflozin (JARDIANCE) 25 mg tablet Take 1 Tablet by mouth Daily (before breakfast). 3/15/23  Yes Lala Edgar MD   montelukast (SINGULAIR) 10 mg tablet Take 1 Tablet by mouth daily. Patient taking differently: Take 10 mg by mouth Every morning. 3/15/23  Yes Lala Edgar MD   albuterol (PROVENTIL HFA, VENTOLIN HFA, PROAIR HFA) 90 mcg/actuation inhaler INHALE 2 PUFFS BY MOUTH EVERY 4-6 HOURS AS FOR WHEEZING 3/15/23  Yes Lala Edgar MD   multivitamin (Daily-Dex) tablet Take 1 Tablet by mouth daily. Patient taking differently: Take 1 Tablet by mouth every evening. 3/15/23  Yes Lala Edgar MD   budesonide-formoteroL (SYMBICORT) 160-4.5 mcg/actuation HFAA Take 2 Puffs by inhalation two (2) times a day. 3/15/23  Yes Lala Edgar MD   cholecalciferol (VITAMIN D3) (1000 Units /25 mcg) tablet Take  by mouth nightly. Yes Provider, Historical   atorvastatin (LIPITOR) 20 mg tablet Take 1 Tablet by mouth daily. Patient taking differently: Take 20 mg by mouth every evening. 11/15/22  Yes Lala Edgar MD   sucralfate (CARAFATE) 1 gram tablet Take 1 g by mouth four (4) times daily. Yes Provider, Historical   fluticasone propionate (FLONASE) 50 mcg/actuation nasal spray 2 Sprays by Both Nostrils route daily. Indications: inflammation of the nose due to an allergy  Patient taking differently: 2 Sprays by Both Nostrils route two (2) times a day.  Indications: inflammation of the nose due to an allergy 10/18/22  Yes Lala Edgar MD ondansetron (ZOFRAN ODT) 4 mg disintegrating tablet Take 1 Tablet by mouth every eight (8) hours as needed for Nausea or Vomiting. 9/12/22  Yes Deborah Del Castillo MD   acyclovir (ZOVIRAX) 400 mg tablet Take 1 Tab by mouth two (2) times a day. 9/3/20  Yes Provider, Historical   Biktarvy tab tablet Take 1 Tablet by mouth Every morning. 50mg/200 mg/25 mg per day. 9/2/20  Yes Provider, Historical   Ozempic 0.25 mg or 0.5 mg/dose (2 mg/1.5 ml) subq pen INJECT 0.5 MG SUBCUTANEOUSLY WEEKLY 2/15/23   Provider, Historical   zolpidem (AMBIEN) 5 mg tablet TAKE ONE TABLET BY MOUTH AT BEDTIME AS NEEDED FOR SLEEP 2/21/23   Provider, Historical   sertraline (ZOLOFT) 50 mg tablet Take  by mouth nightly. Provider, Historical   aspirin delayed-release 81 mg tablet Take 1 Tablet by mouth Every morning. Provider, Historical     Allergies   Allergen Reactions    Latex Unknown (comments)    Lisinopril Swelling     SWELLING OF THE THROAT    Bactrim [Sulfamethoprim] Hives    Elavil Hives    Iron Nausea and Vomiting    Lyrica [Pregabalin] Hives    Adhesive Rash         Review of Systems   Constitutional: Negative. Objective:     Visit Vitals  /89 (BP 1 Location: Left arm, BP Patient Position: At rest)   Pulse 87   Temp 98.7 °F (37.1 °C)   Resp 17   Ht 5' 3\" (1.6 m)   Wt 87 kg (191 lb 12.8 oz)   SpO2 98%   BMI 33.98 kg/m²       Physical Exam  Cardiovascular:      Rate and Rhythm: Normal rate and regular rhythm. Pulmonary:      Effort: Pulmonary effort is normal.      Breath sounds: Normal breath sounds. Musculoskeletal:      Left foot: Bunion present.        Imaging:  images and reports reviewed    Lab Review:    Recent Results (from the past 24 hour(s))   GLUCOSE, POC    Collection Time: 03/22/23  6:45 AM   Result Value Ref Range    Glucose (POC) 187 (H) 65 - 117 mg/dL    Performed by Yoel Banks          Assessment:     Left hallux valgus deformity    Plan:     Left 1st TMTJ arthrodesis and akin osteotomy

## 2023-03-22 NOTE — OP NOTES
Operative Note    Patient: Olya Walter  YOB: 1965  MRN: [id number]    Date of Procedure: 3/22/2023     Pre-Op Diagnosis: HALLUX VALGUS LEFT FOOT    Post-Op Diagnosis: Same as preoperative diagnosis. Procedure(s):  LEFT FOOT LAPIDUS BUNIONECTOMY,  LEFT FOOT AKIN OSTEOMTOMY  (MAC WITH POP W/SAPHENOUS)    Surgeon(s):  Ana Maria Collins DPM    Surgical Assistant: Surg Asst-1: Poppy Lozano    Anesthesia: MAC     Estimated Blood Loss (mL):  Minimal    Complications: None    Specimens: * No specimens in log *     Implants:   Implant Name Type Inv. Item Serial No.  Lot No. LRB No. Used Action   STAPLE BNE FIX [id number] NIT - SNA  STAPLE BNE FIX [id number] NIT NA [facility] [id number] Left 1 Implanted   fast pitch 2.7mm high pitch locking screws    NA [employer] [id number] Left 1 Implanted   SCREWS LOCKING FASTPITCH 2.7MM HIGH PITCH - SNA  SCREWS LOCKING FASTPITCH 2.7MM HIGH PITCH NA [employer]_ [id number] Left 1 Implanted   PUTTY BONE GRAFT FACTOR 2.5ML PEPTIDE ENHANCED W/SYRINGE - SNA  PUTTY BONE GRAFT FACTOR 2.5ML PEPTIDE ENHANCED W/SYRINGE NA [employer] [id number] Left 1 Implanted   viaflow placental tissue matrix 2cc   [id number] [employer]  Left 1 Implanted       Drains: * No LDAs found *    Findings: Reduction and hardware placement confirmed under fluoroscopy     Detailed Description of Procedure: Indications for procedure: Ms. Alpesh Gonzalez is a 60-year-old female who presents to the preoperative holding area awaiting surgical intervention to the left lower extremity. Patient has had chronic pain to their left foot from a hallux valgus deformity. Patient has failed conservative treatment including activity and shoe gear modifications. The patient would like to proceed with surgical intervention at this time. All risks, benefits, and potential complications were discussed with patient in full details.  No guarantees were made to the outcome of the surgical procedure. The patient's n.p.o. status was confirmed. Patient was marked and the surgical consent was signed in the preoperative holding area. Procedure in detail: The patient was identified and the procedure was verified. Patient received a preoperative popliteal and saphenous block in the preoperative holding area by the anesthesia department. The patient was wheeled back to the operating room and placed on the operating room table in normal anatomical supine position. Once the patient was under appropriate plane of anesthesia, the left lower extremity was prepped and draped in normal sterile fashion. Attention was then directed to the dorsal aspect of the left foot. A linear incision was made with a 15 blade over the first metatarsal medial cuneiform joint. The incision was extended down to the subcutaneous tissue with care taken to retract all vital structures including the extensor hallucis longus tendon. Hemostasis was obtained using pressure and the Bovie. Once adequate exposure of the joint capsule was obtained, the joint capsule was incised using a 15 blade. The first metatarsal medial cuneiform joint was freed using a sagittal saw and osteotome. A joystick pin was inserted from medial to lateral across the base of the first metatarsal to allow for derotation of the deformity. The 3&1 joint seeker was inserted into the joint. The first metatarsal-second metatarsal reduction clamp was inserted through a stab incision over the distal aspect of the second metatarsal.  The deformity was reduced and confirmed under fluoroscopy. Once adequate reduction was obtained, a K wire was inserted into the reduction clamp and pins were inserted into the joint seeker. Once again the positioning of the first metatarsal, medial cuneiform, and cut guide were confirmed under fluoroscopy.   A sagittal saw was used to resect the distal aspect of the medial cuneiform and the proximal aspect of the first metatarsal the cut guide was removed and the reduction clamp and joystick pins were also removed. The distal aspect of the medial cuneiform and proximal aspect of the first metatarsal was resected using a rongeur. Complete removal bony fragments was confirmed under fluoroscopy. The surgical site was flushed with copious amounts normal sterile saline. A 2-0 drill was used to fenestrate the proximal aspect of the first metatarsal and the distal aspect of the medial cuneiform. Due to the patient's comorbidities, she has an increased likelyhood of non-union, therefore it was decided that inserting a bone graft into the arthrodesis site would be in the patient's best interest. The bone graft was inserted and the joint was compressed. The surgical site was stabilized using 2 crossing threaded K wires and the Lapiplasty plates were placed dorsally and medially following manufacture technique. Attention was then directed to the distal aspect of the first metatarsal and the proximal phalanx where it was determined that the medial eminence of the first metatarsal needed to be resected and an Alonzo osteotomy needed to be performed. A linear incision was made over the medial aspect of the first metatarsal phalangeal joint with a 15 blade extending down to bone the joint capsule or deflected a sagittal saw was used to resect the medial eminence of the first metatarsal and to cut the Akin osteotomy the Akin osteotomy was secured using a bone staple. The surgical sites were flushed with copious amounts of saline. The subcutaneous tissue was closed using 3-0 Vicryl the skin was closed using 4-0 Monocryl. To aid in pain control, soft tissue healing, and prevention of post-op infection, viaflow placental tissue matrix was injected into the surgical sites. The patient was determined to have tolerated with procedure and anesthesia well without any postop complications.  The patient was transported from the operating room to the postanesthesia care unit with vital signs stable and vascular status intact to the left lower extremity. The patient is to be nonweightbearing to the left lower extremity and has a postoperative visit with myself next week in office. If the patient has any questions or concerns they can contact me directly.       Electronically Signed by Rachell Newby DPM on 3/22/2023 at 9:28 AM

## 2023-03-22 NOTE — ANESTHESIA PREPROCEDURE EVALUATION
Relevant Problems   No relevant active problems       Anesthetic History   No history of anesthetic complications            Review of Systems / Medical History  Patient summary reviewed, nursing notes reviewed and pertinent labs reviewed    Pulmonary    COPD: mild            Comments: Previous smoker  Anxiety/depression   Neuro/Psych       CVA  Headaches and psychiatric history    Comments: Chronic insomnia  CVA x 2: 2019 mild right sided weakness Cardiovascular    Hypertension          Hyperlipidemia    Exercise tolerance: >4 METS     GI/Hepatic/Renal     GERD           Endo/Other    Diabetes: type 2, using insulin    Obesity    Comments: Insulin pump: FBS = 187 Other Findings              Physical Exam    Airway  Mallampati: I    Neck ROM: normal range of motion   Mouth opening: Normal     Cardiovascular    Rhythm: regular  Rate: normal         Dental    Dentition: Edentulous     Pulmonary  Breath sounds clear to auscultation               Abdominal         Other Findings            Anesthetic Plan    ASA: 3  Anesthesia type: MAC          Induction: Intravenous  Anesthetic plan and risks discussed with: Patient      Informed consent obtained.

## 2023-04-03 PROBLEM — E11.9 DM (DIABETES MELLITUS), TYPE 2 (HCC): Status: RESOLVED | Noted: 2020-09-09 | Resolved: 2022-02-12

## 2023-04-03 PROBLEM — E11.65 TYPE 2 DIABETES MELLITUS WITH HYPERGLYCEMIA, WITHOUT LONG-TERM CURRENT USE OF INSULIN (HCC): Status: RESOLVED | Noted: 2021-08-25 | Resolved: 2021-09-12

## 2023-05-10 ENCOUNTER — TELEPHONE (OUTPATIENT)
Facility: CLINIC | Age: 58
End: 2023-05-10

## 2023-05-10 RX ORDER — HYDROXYZINE HYDROCHLORIDE 10 MG/1
10 TABLET, FILM COATED ORAL 3 TIMES DAILY PRN
Qty: 90 TABLET | Refills: 0 | Status: SHIPPED | OUTPATIENT
Start: 2023-05-10 | End: 2023-06-09

## 2023-05-10 NOTE — TELEPHONE ENCOUNTER
Patient came by the office stating she went to [facility] due to elevated heart rate and they stated that it was from anxiety. Patient stated that he said to see PCP to get some medicine to help with her anxiety. Wants to know if you can send a prescription in for her.

## 2023-05-10 NOTE — TELEPHONE ENCOUNTER
Patient came by office stating that [facility] said the never received the prescription for   montelukast (SINGULAIR) 10 mg tablet that was sent on March 15, 2023. Can you please resend this medication to [facility] on [address].

## 2023-05-18 RX ORDER — ATORVASTATIN CALCIUM 20 MG/1
TABLET, FILM COATED ORAL
Qty: 90 TABLET | Refills: 1 | Status: SHIPPED | OUTPATIENT
Start: 2023-05-18

## 2023-06-21 ENCOUNTER — OFFICE VISIT (OUTPATIENT)
Facility: CLINIC | Age: 58
End: 2023-06-21
Payer: MEDICARE

## 2023-06-21 VITALS
RESPIRATION RATE: 18 BRPM | HEART RATE: 60 BPM | WEIGHT: 204 LBS | DIASTOLIC BLOOD PRESSURE: 78 MMHG | SYSTOLIC BLOOD PRESSURE: 134 MMHG | OXYGEN SATURATION: 99 % | HEIGHT: 63 IN | BODY MASS INDEX: 36.14 KG/M2

## 2023-06-21 DIAGNOSIS — Z79.4 LONG TERM (CURRENT) USE OF INSULIN (HCC): ICD-10-CM

## 2023-06-21 DIAGNOSIS — G47.30 SLEEP APNEA, UNSPECIFIED TYPE: ICD-10-CM

## 2023-06-21 DIAGNOSIS — Z00.00 MEDICARE ANNUAL WELLNESS VISIT, SUBSEQUENT: ICD-10-CM

## 2023-06-21 DIAGNOSIS — J44.9 CHRONIC OBSTRUCTIVE PULMONARY DISEASE, UNSPECIFIED COPD TYPE (HCC): ICD-10-CM

## 2023-06-21 DIAGNOSIS — Z87.891 ENCOUNTER FOR SCREENING FOR MALIGNANT NEOPLASM OF LUNG IN FORMER SMOKER WHO QUIT IN PAST 15 YEARS WITH 30 PACK YEAR HISTORY OR GREATER: ICD-10-CM

## 2023-06-21 DIAGNOSIS — Z87.891 PERSONAL HISTORY OF TOBACCO USE, PRESENTING HAZARDS TO HEALTH: ICD-10-CM

## 2023-06-21 DIAGNOSIS — B20 HUMAN IMMUNODEFICIENCY VIRUS (HIV) DISEASE (HCC): ICD-10-CM

## 2023-06-21 DIAGNOSIS — Z96.41 INSULIN PUMP IN PLACE: ICD-10-CM

## 2023-06-21 DIAGNOSIS — Z79.4 TYPE 2 DIABETES MELLITUS WITH HYPERGLYCEMIA, WITH LONG-TERM CURRENT USE OF INSULIN (HCC): ICD-10-CM

## 2023-06-21 DIAGNOSIS — K21.9 GASTROESOPHAGEAL REFLUX DISEASE WITHOUT ESOPHAGITIS: ICD-10-CM

## 2023-06-21 DIAGNOSIS — E78.2 MIXED HYPERLIPIDEMIA: ICD-10-CM

## 2023-06-21 DIAGNOSIS — Z87.891 EX-SMOKER: ICD-10-CM

## 2023-06-21 DIAGNOSIS — Z12.4 SCREENING FOR CERVICAL CANCER: ICD-10-CM

## 2023-06-21 DIAGNOSIS — I10 ESSENTIAL (PRIMARY) HYPERTENSION: ICD-10-CM

## 2023-06-21 DIAGNOSIS — Z12.2 ENCOUNTER FOR SCREENING FOR MALIGNANT NEOPLASM OF LUNG IN FORMER SMOKER WHO QUIT IN PAST 15 YEARS WITH 30 PACK YEAR HISTORY OR GREATER: ICD-10-CM

## 2023-06-21 DIAGNOSIS — Z23 NEED FOR PROPHYLACTIC VACCINATION AND INOCULATION AGAINST VARICELLA: ICD-10-CM

## 2023-06-21 DIAGNOSIS — N18.31 STAGE 3A CHRONIC KIDNEY DISEASE (HCC): ICD-10-CM

## 2023-06-21 DIAGNOSIS — E11.65 TYPE 2 DIABETES MELLITUS WITH HYPERGLYCEMIA, WITH LONG-TERM CURRENT USE OF INSULIN (HCC): ICD-10-CM

## 2023-06-21 DIAGNOSIS — E11.65 UNCONTROLLED TYPE 2 DIABETES MELLITUS WITH HYPERGLYCEMIA (HCC): ICD-10-CM

## 2023-06-21 PROCEDURE — 3074F SYST BP LT 130 MM HG: CPT | Performed by: INTERNAL MEDICINE

## 2023-06-21 PROCEDURE — 3078F DIAST BP <80 MM HG: CPT | Performed by: INTERNAL MEDICINE

## 2023-06-21 PROCEDURE — G0439 PPPS, SUBSEQ VISIT: HCPCS | Performed by: INTERNAL MEDICINE

## 2023-06-21 RX ORDER — OMEPRAZOLE 40 MG/1
40 CAPSULE, DELAYED RELEASE ORAL DAILY
Qty: 90 CAPSULE | Refills: 1 | Status: SHIPPED | OUTPATIENT
Start: 2023-06-21

## 2023-06-21 RX ORDER — ATORVASTATIN CALCIUM 20 MG/1
20 TABLET, FILM COATED ORAL DAILY
Qty: 90 TABLET | Refills: 1 | Status: SHIPPED | OUTPATIENT
Start: 2023-06-21

## 2023-06-21 RX ORDER — PIOGLITAZONEHYDROCHLORIDE 30 MG/1
30 TABLET ORAL
Qty: 90 TABLET | Refills: 1 | Status: SHIPPED | OUTPATIENT
Start: 2023-06-21

## 2023-06-21 RX ORDER — INSULIN GLARGINE 100 [IU]/ML
INJECTION, SOLUTION SUBCUTANEOUS
COMMUNITY

## 2023-06-21 RX ORDER — BUDESONIDE AND FORMOTEROL FUMARATE DIHYDRATE 160; 4.5 UG/1; UG/1
2 AEROSOL RESPIRATORY (INHALATION) 2 TIMES DAILY
COMMUNITY
Start: 2023-05-24

## 2023-06-21 RX ORDER — GABAPENTIN 100 MG/1
100 CAPSULE ORAL 3 TIMES DAILY
COMMUNITY
Start: 2023-05-25

## 2023-06-21 RX ORDER — TRIAMTERENE AND HYDROCHLOROTHIAZIDE 37.5; 25 MG/1; MG/1
1 CAPSULE ORAL EVERY MORNING
Qty: 90 CAPSULE | Refills: 1 | Status: SHIPPED | OUTPATIENT
Start: 2023-06-21

## 2023-06-21 RX ORDER — METOPROLOL SUCCINATE 25 MG/1
25 TABLET, EXTENDED RELEASE ORAL DAILY
Qty: 90 TABLET | Refills: 1 | Status: SHIPPED | OUTPATIENT
Start: 2023-06-21

## 2023-06-21 RX ORDER — INSULIN LISPRO 100 [IU]/ML
INJECTION, SOLUTION INTRAVENOUS; SUBCUTANEOUS
COMMUNITY

## 2023-06-21 SDOH — ECONOMIC STABILITY: FOOD INSECURITY: WITHIN THE PAST 12 MONTHS, THE FOOD YOU BOUGHT JUST DIDN'T LAST AND YOU DIDN'T HAVE MONEY TO GET MORE.: NEVER TRUE

## 2023-06-21 SDOH — ECONOMIC STABILITY: FOOD INSECURITY: WITHIN THE PAST 12 MONTHS, YOU WORRIED THAT YOUR FOOD WOULD RUN OUT BEFORE YOU GOT MONEY TO BUY MORE.: NEVER TRUE

## 2023-06-21 SDOH — ECONOMIC STABILITY: HOUSING INSECURITY
IN THE LAST 12 MONTHS, WAS THERE A TIME WHEN YOU DID NOT HAVE A STEADY PLACE TO SLEEP OR SLEPT IN A SHELTER (INCLUDING NOW)?: NO

## 2023-06-21 SDOH — ECONOMIC STABILITY: INCOME INSECURITY: HOW HARD IS IT FOR YOU TO PAY FOR THE VERY BASICS LIKE FOOD, HOUSING, MEDICAL CARE, AND HEATING?: NOT HARD AT ALL

## 2023-06-21 ASSESSMENT — PATIENT HEALTH QUESTIONNAIRE - PHQ9
8. MOVING OR SPEAKING SO SLOWLY THAT OTHER PEOPLE COULD HAVE NOTICED. OR THE OPPOSITE, BEING SO FIGETY OR RESTLESS THAT YOU HAVE BEEN MOVING AROUND A LOT MORE THAN USUAL: 0
SUM OF ALL RESPONSES TO PHQ QUESTIONS 1-9: 0
SUM OF ALL RESPONSES TO PHQ QUESTIONS 1-9: 0
4. FEELING TIRED OR HAVING LITTLE ENERGY: 0
7. TROUBLE CONCENTRATING ON THINGS, SUCH AS READING THE NEWSPAPER OR WATCHING TELEVISION: 0
10. IF YOU CHECKED OFF ANY PROBLEMS, HOW DIFFICULT HAVE THESE PROBLEMS MADE IT FOR YOU TO DO YOUR WORK, TAKE CARE OF THINGS AT HOME, OR GET ALONG WITH OTHER PEOPLE: 0
1. LITTLE INTEREST OR PLEASURE IN DOING THINGS: 0
SUM OF ALL RESPONSES TO PHQ QUESTIONS 1-9: 0
SUM OF ALL RESPONSES TO PHQ9 QUESTIONS 1 & 2: 0
3. TROUBLE FALLING OR STAYING ASLEEP: 0
5. POOR APPETITE OR OVEREATING: 0
6. FEELING BAD ABOUT YOURSELF - OR THAT YOU ARE A FAILURE OR HAVE LET YOURSELF OR YOUR FAMILY DOWN: 0
SUM OF ALL RESPONSES TO PHQ QUESTIONS 1-9: 0
2. FEELING DOWN, DEPRESSED OR HOPELESS: 0
9. THOUGHTS THAT YOU WOULD BE BETTER OFF DEAD, OR OF HURTING YOURSELF: 0

## 2023-06-21 ASSESSMENT — LIFESTYLE VARIABLES
HOW MANY STANDARD DRINKS CONTAINING ALCOHOL DO YOU HAVE ON A TYPICAL DAY: PATIENT DOES NOT DRINK
HOW OFTEN DO YOU HAVE A DRINK CONTAINING ALCOHOL: NEVER

## 2023-06-21 NOTE — PROGRESS NOTES
Medicare Annual Wellness Visit    Stephanie De La Torre is here for Medicare AWV    Assessment & Plan   Medicare annual wellness visit, subsequent  -     zoster recombinant adjuvanted vaccine [facility]) 50 MCG/0.5ML SUSR injection; Inject 0.5 mLs into the muscle once for 1 dose, Disp-0.5 mL, R-0Print  Essential (primary) hypertension  Chronic obstructive pulmonary disease, unspecified COPD type (HCC)  Mixed hyperlipidemia  Long term (current) use of insulin (HCC)  Human immunodeficiency virus (HIV) disease ([facility]  Sleep apnea, unspecified type  Type 2 diabetes mellitus with hyperglycemia, with long-term current use of insulin (HCC)  Uncontrolled type 2 diabetes mellitus with hyperglycemia (HCC)  Ex-smoker  Insulin pump in place  Gastroesophageal reflux disease without esophagitis  Stage 3a chronic kidney disease (HCC)  Personal history of tobacco use, presenting hazards to health  Need for prophylactic vaccination and inoculation against varicella  -     zoster recombinant adjuvanted vaccine (SHINGRIX) 50 MCG/0.5ML SUSR injection; Inject 0.5 mLs into the muscle once for 1 dose, Disp-0.5 mL, R-0Print  Encounter for screening for malignant neoplasm of lung in former smoker who quit in past 15 years with 30 pack year history or greater  -     CT LUNG SCREENING (INITIAL/ANNUAL); Future  Screening for cervical cancer  -     [facility] - Nicholas Aden MD, Ob-Gyn, [facility]    Recommendations for [place] [address] Due: see orders and patient instructions/AVS.  Recommended screening schedule for the next 5-10 years is provided to the patient in written form: see Patient Instructions/AVS.    She is up to date for health screening including mammogram and colonoscopy. Bone density ordered for screening osteoporosis. She is up to date with her labs. Her hemoglobin A1c is controlled on insulin pump under the care of endocrinologist.  She has stable depression she says she has her good days and bad days.   She has good vision

## 2023-06-21 NOTE — PROGRESS NOTES
Chief Complaint   Patient presents with    Medicare AWV         /86 (Site: Left Upper Arm, Position: Sitting)   Pulse 62   Resp 18   Ht 5' 3\" (1.6 m)   Wt 204 lb (92.5 kg)   SpO2 99%   BMI 36.14 kg/m²         1. \"Have you been to the ER, urgent care clinic since your last visit? Hospitalized since your last visit? \" No    2. \"Have you seen or consulted any other health care providers outside of the [address] [place] [address] since your last visit? \" No     3. For patients aged 39-70: Has the patient had a colonoscopy / FIT/ Cologuard? Yes - Care Gap present. Most recent result on file      If the patient is female:    4. For patients aged 41-77: Has the patient had a mammogram within the past 2 years? Yes - Care Gap present. Most recent result on file      5. For patients aged 21-65: Has the patient had a pap smear?  No

## 2023-06-21 NOTE — PATIENT INSTRUCTIONS
quitting for good. Limit alcohol to 2 drinks a day for men and 1 drink a day for women. Too much alcohol can cause health problems. If you have a BMI higher than 25  Your doctor may do other tests to check your risk for weight-related health problems. This may include measuring the distance around your waist. A waist measurement of more than 40 inches in men or 35 inches in women can increase the risk of weight-related health problems. Talk with your doctor about steps you can take to stay healthy or improve your health. You may need to make lifestyle changes to lose weight and stay healthy, such as changing your diet and getting regular exercise. If you have a BMI lower than 18.5  Your doctor may do other tests to check your risk for health problems. Talk with your doctor about steps you can take to stay healthy or improve your health. You may need to make lifestyle changes to gain or maintain weight and stay healthy, such as getting more healthy foods in your diet and doing exercises to build muscle. Where can you learn more? Go to http://www.woods.com/ and enter S176 to learn more about \"Body Mass Index: Care Instructions. \"  Current as of: August 25, 2022               Content Version: 13.7  © 2006-2023 [employer]wise, [employer]. Care instructions adapted under license by [facility] ([facility]). If you have questions about a medical condition or this instruction, always ask your healthcare professional. [address] any warranty or liability for your use of this information. DASH Diet: Care Instructions  Your Care Instructions     The DASH diet is an eating plan that can help lower your blood pressure. DASH stands for Dietary Approaches to Stop Hypertension. Hypertension is high blood pressure. The DASH diet focuses on eating foods that are high in calcium, potassium, and magnesium. These nutrients can lower blood pressure.  The foods that are highest in these

## 2023-07-11 ENCOUNTER — TELEPHONE (OUTPATIENT)
Facility: CLINIC | Age: 58
End: 2023-07-11

## 2023-07-11 NOTE — TELEPHONE ENCOUNTER
Patient stated that she has thrush on her tongue from Miguelina Mckeon. She would like something to help her from stop throwing up.  She would like something sent to [facility]

## 2023-07-12 RX ORDER — HYDROXYZINE HYDROCHLORIDE 10 MG/1
10 TABLET, FILM COATED ORAL 3 TIMES DAILY PRN
Qty: 90 TABLET | Refills: 0 | Status: SHIPPED | OUTPATIENT
Start: 2023-07-12 | End: 2023-08-11

## 2023-07-12 NOTE — TELEPHONE ENCOUNTER
Called and spoke to pt regarding her tongue. Pt says she needs to be seen. Pt verbalized understanding of Jardiance NOT causing yeast in mouth. I told pt I will check with Dr. Enrico Mckeon about a good time to put her on schedule since she is currently full and call pt in am. Pt verbalized understanding of these instructions.

## 2023-07-13 ENCOUNTER — TELEPHONE (OUTPATIENT)
Facility: CLINIC | Age: 58
End: 2023-07-13

## 2023-07-13 ENCOUNTER — OFFICE VISIT (OUTPATIENT)
Facility: CLINIC | Age: 58
End: 2023-07-13
Payer: MEDICARE

## 2023-07-13 VITALS
WEIGHT: 196.4 LBS | SYSTOLIC BLOOD PRESSURE: 118 MMHG | TEMPERATURE: 97.1 F | HEIGHT: 63 IN | OXYGEN SATURATION: 98 % | BODY MASS INDEX: 34.8 KG/M2 | DIASTOLIC BLOOD PRESSURE: 82 MMHG | HEART RATE: 76 BPM

## 2023-07-13 DIAGNOSIS — N18.31 STAGE 3A CHRONIC KIDNEY DISEASE (HCC): ICD-10-CM

## 2023-07-13 DIAGNOSIS — E08.8 DIABETES MELLITUS DUE TO UNDERLYING CONDITION, CONTROLLED, WITH COMPLICATION, WITH LONG-TERM CURRENT USE OF INSULIN (HCC): ICD-10-CM

## 2023-07-13 DIAGNOSIS — K13.79 PAIN IN MOUTH: ICD-10-CM

## 2023-07-13 DIAGNOSIS — B37.0 ORAL THRUSH: ICD-10-CM

## 2023-07-13 DIAGNOSIS — B20 HUMAN IMMUNODEFICIENCY VIRUS (HIV) DISEASE (HCC): ICD-10-CM

## 2023-07-13 DIAGNOSIS — Z96.41 INSULIN PUMP IN PLACE: ICD-10-CM

## 2023-07-13 DIAGNOSIS — K12.0 APHTHOUS ULCER OF MOUTH: ICD-10-CM

## 2023-07-13 DIAGNOSIS — Z79.4 DIABETES MELLITUS DUE TO UNDERLYING CONDITION, CONTROLLED, WITH COMPLICATION, WITH LONG-TERM CURRENT USE OF INSULIN (HCC): ICD-10-CM

## 2023-07-13 LAB — GLUCOSE, POC: 150 MG/DL

## 2023-07-13 PROCEDURE — 3074F SYST BP LT 130 MM HG: CPT | Performed by: INTERNAL MEDICINE

## 2023-07-13 PROCEDURE — 3079F DIAST BP 80-89 MM HG: CPT | Performed by: INTERNAL MEDICINE

## 2023-07-13 PROCEDURE — 99214 OFFICE O/P EST MOD 30 MIN: CPT | Performed by: INTERNAL MEDICINE

## 2023-07-13 PROCEDURE — 82962 GLUCOSE BLOOD TEST: CPT | Performed by: INTERNAL MEDICINE

## 2023-07-13 RX ORDER — FLUOCINONIDE GEL 0.5 MG/G
GEL TOPICAL
Qty: 30 G | Refills: 0 | Status: SHIPPED | OUTPATIENT
Start: 2023-07-13

## 2023-07-13 RX ORDER — FLUTICASONE PROPIONATE 50 MCG
2 SPRAY, SUSPENSION (ML) NASAL DAILY
Qty: 16 G | Refills: 5 | Status: SHIPPED | OUTPATIENT
Start: 2023-07-13

## 2023-07-13 RX ORDER — METOPROLOL SUCCINATE 25 MG/1
25 TABLET, EXTENDED RELEASE ORAL DAILY
Qty: 30 TABLET | Refills: 5 | Status: SHIPPED | OUTPATIENT
Start: 2023-07-13

## 2023-07-13 RX ORDER — MONTELUKAST SODIUM 10 MG/1
10 TABLET ORAL DAILY
Qty: 30 TABLET | Refills: 5 | Status: SHIPPED | OUTPATIENT
Start: 2023-07-13

## 2023-07-13 RX ORDER — IBUPROFEN 200 MG
1 CAPSULE ORAL EVERY MORNING
Qty: 30 TABLET | Refills: 5 | Status: SHIPPED | OUTPATIENT
Start: 2023-07-13

## 2023-07-13 RX ORDER — SEMAGLUTIDE 1.34 MG/ML
INJECTION, SOLUTION SUBCUTANEOUS
Qty: 4 ADJUSTABLE DOSE PRE-FILLED PEN SYRINGE | Refills: 2 | Status: SHIPPED | OUTPATIENT
Start: 2023-07-13

## 2023-07-13 RX ORDER — ONDANSETRON 4 MG/1
4 TABLET, ORALLY DISINTEGRATING ORAL EVERY 8 HOURS PRN
Qty: 30 TABLET | Refills: 3 | Status: SHIPPED | OUTPATIENT
Start: 2023-07-13

## 2023-07-13 RX ORDER — OMEPRAZOLE 40 MG/1
40 CAPSULE, DELAYED RELEASE ORAL DAILY
Qty: 30 CAPSULE | Refills: 5 | Status: SHIPPED | OUTPATIENT
Start: 2023-07-13

## 2023-07-13 ASSESSMENT — ENCOUNTER SYMPTOMS
ALLERGIC/IMMUNOLOGIC NEGATIVE: 1
EYES NEGATIVE: 1
RESPIRATORY NEGATIVE: 1
GASTROINTESTINAL NEGATIVE: 1

## 2023-07-13 NOTE — PROGRESS NOTES
Garrison Holter (: 1965) is a 62 y.o. female, Established patient patient, here for evaluation of the following chief complaint(s):  Oral Pain (Possible thrush)         ASSESSMENT/PLAN:  Below is the assessment and plan developed based on review of pertinent history, physical exam, labs, studies, and medications. 1. Oral thrush  2. Aphthous ulcer of mouth  3. Diabetes mellitus due to underlying condition, controlled, with complication, with long-term current use of insulin ([facility])  -     AMB POC GLUCOSE BLOOD, BY GLUCOSE MONITORING DEVICE  4. Human immunodeficiency virus (HIV) disease ([address] )  -     AMB POC GLUCOSE BLOOD, BY GLUCOSE MONITORING DEVICE  5. Stage 3a chronic kidney disease ([facility])  -     AMB POC GLUCOSE BLOOD, BY GLUCOSE MONITORING DEVICE  6. Insulin pump in place  7. Pain in mouth  -     AMB POC GLUCOSE BLOOD, BY GLUCOSE MONITORING DEVICE    Pain in mouth, also having aphthous ulcers in the mouth with soreness in the mouth and she says she cleaned her tongue so I could not see the cross but I sent in both medicine sees does not have painful swallowing. She said in the past she had EGD done and colonoscopy done with gastroenterologist.  She has no yeast infection in the vagina and no symptoms of vaginitis. Explained various etiologies for aphthous ulcers and thrush in the mouth. Started on nystatin swish and spit and also on steroid gel topically. Type 2 diabetes mellitus, her random blood sugar 150 most recent hemoglobin A1c was 6.6%. Having insulin pump follow the direction given by endocrinologist    HIV infection currently asymptomatic not immunosuppressed. I will try to review the notes from ID specialist.  She takes GlucoSentient. She also takes Zofran that she ran out that I gave refills and omeprazole also takes sertraline. Blood pressure is well controlled. Return for f/u as scheduled. .         SUBJECTIVE/OBJECTIVE:  HPI    Garrison Holter with a pleasant personality came

## 2023-07-18 ENCOUNTER — HOSPITAL ENCOUNTER (OUTPATIENT)
Facility: HOSPITAL | Age: 58
Discharge: HOME | End: 2023-07-21
Attending: INTERNAL MEDICINE
Payer: MEDICARE

## 2023-07-18 VITALS — WEIGHT: 194 LBS | BODY MASS INDEX: 34.38 KG/M2 | HEIGHT: 63 IN

## 2023-07-18 DIAGNOSIS — Z12.2 ENCOUNTER FOR SCREENING FOR MALIGNANT NEOPLASM OF LUNG IN FORMER SMOKER WHO QUIT IN PAST 15 YEARS WITH 30 PACK YEAR HISTORY OR GREATER: ICD-10-CM

## 2023-07-18 DIAGNOSIS — Z87.891 ENCOUNTER FOR SCREENING FOR MALIGNANT NEOPLASM OF LUNG IN FORMER SMOKER WHO QUIT IN PAST 15 YEARS WITH 30 PACK YEAR HISTORY OR GREATER: ICD-10-CM

## 2023-07-18 PROCEDURE — 71271 CT THORAX LUNG CANCER SCR C-: CPT

## 2023-07-24 ENCOUNTER — TELEPHONE (OUTPATIENT)
Facility: CLINIC | Age: 58
End: 2023-07-24

## 2023-07-24 NOTE — TELEPHONE ENCOUNTER
----- Message from Frandy Perez MD sent at 7/21/2023 11:16 AM EDT -----  Ms. Rivera All had a history of smoking and low-dose lung CT scan for screening lung cancer ordered, as per the radiologist she has 4 mm nodules and moderate emphysema so they are scattered and she should have once a year screening with low-dose lung CT to see any changes in the size of the lung nodules right now no active intervention just to remain abstinence from smoking

## 2023-08-07 RX ORDER — HYDROXYZINE HYDROCHLORIDE 10 MG/1
10 TABLET, FILM COATED ORAL 3 TIMES DAILY PRN
Qty: 90 TABLET | Refills: 0 | Status: SHIPPED | OUTPATIENT
Start: 2023-08-07 | End: 2023-09-06

## 2023-08-09 ENCOUNTER — OFFICE VISIT (OUTPATIENT)
Age: 58
End: 2023-08-09
Payer: MEDICARE

## 2023-08-09 VITALS
SYSTOLIC BLOOD PRESSURE: 114 MMHG | WEIGHT: 200.25 LBS | HEIGHT: 63 IN | BODY MASS INDEX: 35.48 KG/M2 | DIASTOLIC BLOOD PRESSURE: 74 MMHG

## 2023-08-09 DIAGNOSIS — Z01.419 ENCOUNTER FOR ANNUAL ROUTINE GYNECOLOGICAL EXAMINATION: ICD-10-CM

## 2023-08-09 DIAGNOSIS — N95.1 SYMPTOMATIC MENOPAUSAL OR FEMALE CLIMACTERIC STATES: ICD-10-CM

## 2023-08-09 DIAGNOSIS — Z12.72 VAGINAL PAP SMEAR: Primary | ICD-10-CM

## 2023-08-09 DIAGNOSIS — Z00.00 ANNUAL VISIT FOR GENERAL ADULT MEDICAL EXAMINATION WITHOUT ABNORMAL FINDINGS: ICD-10-CM

## 2023-08-09 PROCEDURE — 3078F DIAST BP <80 MM HG: CPT | Performed by: OBSTETRICS & GYNECOLOGY

## 2023-08-09 PROCEDURE — 99386 PREV VISIT NEW AGE 40-64: CPT | Performed by: OBSTETRICS & GYNECOLOGY

## 2023-08-09 PROCEDURE — 3074F SYST BP LT 130 MM HG: CPT | Performed by: OBSTETRICS & GYNECOLOGY

## 2023-08-09 RX ORDER — ESTRADIOL 1 MG/1
1 TABLET ORAL DAILY
Qty: 30 TABLET | Refills: 0 | Status: SHIPPED | OUTPATIENT
Start: 2023-08-09

## 2023-08-10 LAB
., LABCORP: NORMAL
CYTOLOGIST CVX/VAG CYTO: NORMAL
CYTOLOGY CVX/VAG DOC CYTO: NORMAL
CYTOLOGY CVX/VAG DOC THIN PREP: NORMAL
DX ICD CODE: NORMAL
ESTRADIOL SERPL-MCNC: <5 PG/ML
FSH SERPL-ACNC: 52.2 MIU/ML
LH SERPL-ACNC: 35 MIU/ML
Lab: NORMAL
OTHER STN SPEC: NORMAL
PROLACTIN SERPL-MCNC: 3.5 NG/ML (ref 4.8–23.3)
STAT OF ADQ CVX/VAG CYTO-IMP: NORMAL

## 2023-08-15 LAB
T4 FREE SERPL DIALY-MCNC: 1.3 NG/DL
TSH SERPL-ACNC: 1.1 UU/ML

## 2023-09-08 ENCOUNTER — OFFICE VISIT (OUTPATIENT)
Age: 58
End: 2023-09-08
Payer: MEDICARE

## 2023-09-08 VITALS
SYSTOLIC BLOOD PRESSURE: 154 MMHG | DIASTOLIC BLOOD PRESSURE: 96 MMHG | HEIGHT: 63 IN | WEIGHT: 202.5 LBS | BODY MASS INDEX: 35.88 KG/M2

## 2023-09-08 DIAGNOSIS — N95.1 SYMPTOMATIC MENOPAUSAL OR FEMALE CLIMACTERIC STATES: Primary | ICD-10-CM

## 2023-09-08 PROCEDURE — 3077F SYST BP >= 140 MM HG: CPT | Performed by: OBSTETRICS & GYNECOLOGY

## 2023-09-08 PROCEDURE — 3080F DIAST BP >= 90 MM HG: CPT | Performed by: OBSTETRICS & GYNECOLOGY

## 2023-09-08 PROCEDURE — 99213 OFFICE O/P EST LOW 20 MIN: CPT | Performed by: OBSTETRICS & GYNECOLOGY

## 2023-09-08 RX ORDER — ESTRADIOL 1 MG/1
1 TABLET ORAL DAILY
Qty: 30 TABLET | Refills: 5 | Status: SHIPPED | OUTPATIENT
Start: 2023-09-08

## 2023-09-08 NOTE — PROGRESS NOTES
Nella Garcia is a 62 y.o. female who presents today for the following:  Chief Complaint   Patient presents with    Medication Check     Pt presents for follow up from starting estradiol //MKeeley         Allergies   Allergen Reactions    Latex Hives    Lisinopril Swelling     SWELLING OF THE THROAT  Other reaction(s): ANGIOEDEMA, Not available    Amitriptyline Hives    Iron Nausea And Vomiting     Other reaction(s): Not available    Pregabalin Hives    Sulfamethoxazole-Trimethoprim Hives     Other reaction(s): Not available    Adhesive Tape Rash    Sulfamethoxazole Other (See Comments)    Trimethoprim Other (See Comments)       Current Outpatient Medications   Medication Sig Dispense Refill    estradiol (ESTRACE) 1 MG tablet Take 1 tablet by mouth daily 30 tablet 5    fluticasone (FLONASE) 50 MCG/ACT nasal spray 2 sprays by Nasal route daily 16 g 5    calcium carbonate (OYSTER SHELL CALCIUM 500 MG) 1250 (500 Ca) MG tablet Take 1 tablet by mouth every morning 30 tablet 5    metoprolol succinate (TOPROL XL) 25 MG extended release tablet Take 1 tablet by mouth daily 30 tablet 5    montelukast (SINGULAIR) 10 MG tablet Take 1 tablet by mouth daily 30 tablet 5    ondansetron (ZOFRAN-ODT) 4 MG disintegrating tablet Take 1 tablet by mouth every 8 hours as needed for Nausea 30 tablet 3    omeprazole (PRILOSEC) 40 MG delayed release capsule Take 1 capsule by mouth daily 30 capsule 5    Semaglutide,0.25 or 0.5MG/DOS, (OZEMPIC, 0.25 OR 0.5 MG/DOSE,) 2 MG/1.5ML SOPN INJECT 0.5 MG SUBCUTANEOUSLY WEEKLY 4 Adjustable Dose Pre-filled Pen Syringe 2    fluocinonide (LIDEX) 0.05 % gel Apply topically 2 times daily. In the mouth for 1 week. Then as needed 30 g 0    SYMBICORT 160-4.5 MCG/ACT AERO Inhale 2 puffs into the lungs 2 times daily      gabapentin (NEURONTIN) 100 MG capsule Take 1 capsule by mouth 3 times daily.       insulin lispro (HUMALOG) 100 UNIT/ML SOLN injection vial Humalog U-100 Insulin 100 unit/mL subcutaneous

## 2023-09-18 RX ORDER — HYDROXYZINE HYDROCHLORIDE 10 MG/1
TABLET, FILM COATED ORAL
Qty: 90 TABLET | Refills: 1 | Status: SHIPPED | OUTPATIENT
Start: 2023-09-18

## 2023-09-20 RX ORDER — BUDESONIDE AND FORMOTEROL FUMARATE DIHYDRATE 160; 4.5 UG/1; UG/1
2 AEROSOL RESPIRATORY (INHALATION) 2 TIMES DAILY
Qty: 10.2 EACH | Refills: 5 | Status: SHIPPED | OUTPATIENT
Start: 2023-09-20

## 2023-09-26 ENCOUNTER — OFFICE VISIT (OUTPATIENT)
Facility: CLINIC | Age: 58
End: 2023-09-26
Payer: MEDICARE

## 2023-09-26 VITALS
BODY MASS INDEX: 36.28 KG/M2 | HEART RATE: 72 BPM | WEIGHT: 204.8 LBS | TEMPERATURE: 98.1 F | DIASTOLIC BLOOD PRESSURE: 78 MMHG | SYSTOLIC BLOOD PRESSURE: 110 MMHG | OXYGEN SATURATION: 98 %

## 2023-09-26 DIAGNOSIS — E11.65 TYPE 2 DIABETES MELLITUS WITH HYPERGLYCEMIA, WITH LONG-TERM CURRENT USE OF INSULIN (HCC): ICD-10-CM

## 2023-09-26 DIAGNOSIS — E78.2 MIXED HYPERLIPIDEMIA: ICD-10-CM

## 2023-09-26 DIAGNOSIS — Z86.73 HISTORY OF TIA (TRANSIENT ISCHEMIC ATTACK): ICD-10-CM

## 2023-09-26 DIAGNOSIS — Z79.4 TYPE 2 DIABETES MELLITUS WITH HYPERGLYCEMIA, WITH LONG-TERM CURRENT USE OF INSULIN (HCC): ICD-10-CM

## 2023-09-26 DIAGNOSIS — N18.31 STAGE 3A CHRONIC KIDNEY DISEASE (HCC): ICD-10-CM

## 2023-09-26 DIAGNOSIS — J44.9 CHRONIC OBSTRUCTIVE PULMONARY DISEASE, UNSPECIFIED COPD TYPE (HCC): ICD-10-CM

## 2023-09-26 DIAGNOSIS — K21.9 GASTROESOPHAGEAL REFLUX DISEASE WITHOUT ESOPHAGITIS: ICD-10-CM

## 2023-09-26 DIAGNOSIS — Z79.4 LONG TERM (CURRENT) USE OF INSULIN (HCC): ICD-10-CM

## 2023-09-26 DIAGNOSIS — G47.30 SLEEP APNEA, UNSPECIFIED TYPE: ICD-10-CM

## 2023-09-26 DIAGNOSIS — I10 ESSENTIAL (PRIMARY) HYPERTENSION: ICD-10-CM

## 2023-09-26 DIAGNOSIS — J30.9 ALLERGIC RHINITIS, UNSPECIFIED SEASONALITY, UNSPECIFIED TRIGGER: ICD-10-CM

## 2023-09-26 DIAGNOSIS — B20 HUMAN IMMUNODEFICIENCY VIRUS (HIV) DISEASE (HCC): ICD-10-CM

## 2023-09-26 DIAGNOSIS — Z96.41 INSULIN PUMP IN PLACE: ICD-10-CM

## 2023-09-26 DIAGNOSIS — I10 ESSENTIAL (PRIMARY) HYPERTENSION: Primary | ICD-10-CM

## 2023-09-26 LAB
GLUCOSE, POC: 132 MG/DL
HBA1C MFR BLD: 10.3 %

## 2023-09-26 PROCEDURE — 82962 GLUCOSE BLOOD TEST: CPT | Performed by: INTERNAL MEDICINE

## 2023-09-26 PROCEDURE — 99214 OFFICE O/P EST MOD 30 MIN: CPT | Performed by: INTERNAL MEDICINE

## 2023-09-26 PROCEDURE — 90674 CCIIV4 VAC NO PRSV 0.5 ML IM: CPT | Performed by: INTERNAL MEDICINE

## 2023-09-26 PROCEDURE — G0008 ADMIN INFLUENZA VIRUS VAC: HCPCS | Performed by: INTERNAL MEDICINE

## 2023-09-26 PROCEDURE — 3074F SYST BP LT 130 MM HG: CPT | Performed by: INTERNAL MEDICINE

## 2023-09-26 PROCEDURE — 3078F DIAST BP <80 MM HG: CPT | Performed by: INTERNAL MEDICINE

## 2023-09-26 PROCEDURE — 83036 HEMOGLOBIN GLYCOSYLATED A1C: CPT | Performed by: INTERNAL MEDICINE

## 2023-09-26 ASSESSMENT — ENCOUNTER SYMPTOMS
RESPIRATORY NEGATIVE: 1
ALLERGIC/IMMUNOLOGIC NEGATIVE: 1
EYES NEGATIVE: 1
GASTROINTESTINAL NEGATIVE: 1

## 2023-09-27 LAB
ALBUMIN SERPL-MCNC: 3.9 G/DL (ref 3.8–4.9)
ALBUMIN/CREAT UR: <4 MG/G CREAT (ref 0–29)
ALBUMIN/GLOB SERPL: 1.3 {RATIO} (ref 1.2–2.2)
ALP SERPL-CCNC: 76 IU/L (ref 44–121)
ALT SERPL-CCNC: 17 IU/L (ref 0–32)
AST SERPL-CCNC: 20 IU/L (ref 0–40)
BASOPHILS # BLD AUTO: 0 X10E3/UL (ref 0–0.2)
BASOPHILS NFR BLD AUTO: 1 %
BILIRUB SERPL-MCNC: 0.3 MG/DL (ref 0–1.2)
BUN SERPL-MCNC: 16 MG/DL (ref 6–24)
BUN/CREAT SERPL: 15 (ref 9–23)
CALCIUM SERPL-MCNC: 9.1 MG/DL (ref 8.7–10.2)
CHLORIDE SERPL-SCNC: 107 MMOL/L (ref 96–106)
CHOLEST SERPL-MCNC: 201 MG/DL (ref 100–199)
CO2 SERPL-SCNC: 26 MMOL/L (ref 20–29)
CREAT SERPL-MCNC: 1.06 MG/DL (ref 0.57–1)
CREAT UR-MCNC: 75.5 MG/DL
EGFRCR SERPLBLD CKD-EPI 2021: 61 ML/MIN/1.73
EOSINOPHIL # BLD AUTO: 0.2 X10E3/UL (ref 0–0.4)
EOSINOPHIL NFR BLD AUTO: 3 %
ERYTHROCYTE [DISTWIDTH] IN BLOOD BY AUTOMATED COUNT: 15.6 % (ref 11.7–15.4)
GLOBULIN SER CALC-MCNC: 3.1 G/DL (ref 1.5–4.5)
GLUCOSE SERPL-MCNC: 116 MG/DL (ref 70–99)
HCT VFR BLD AUTO: 38 % (ref 34–46.6)
HDLC SERPL-MCNC: 68 MG/DL
HGB BLD-MCNC: 12.3 G/DL (ref 11.1–15.9)
IMM GRANULOCYTES # BLD AUTO: 0 X10E3/UL (ref 0–0.1)
IMM GRANULOCYTES NFR BLD AUTO: 0 %
LDLC SERPL CALC-MCNC: 113 MG/DL (ref 0–99)
LYMPHOCYTES # BLD AUTO: 2 X10E3/UL (ref 0.7–3.1)
LYMPHOCYTES NFR BLD AUTO: 34 %
MCH RBC QN AUTO: 28.1 PG (ref 26.6–33)
MCHC RBC AUTO-ENTMCNC: 32.4 G/DL (ref 31.5–35.7)
MCV RBC AUTO: 87 FL (ref 79–97)
MICROALBUMIN UR-MCNC: <3 UG/ML
MONOCYTES # BLD AUTO: 0.6 X10E3/UL (ref 0.1–0.9)
MONOCYTES NFR BLD AUTO: 9 %
NEUTROPHILS # BLD AUTO: 3.2 X10E3/UL (ref 1.4–7)
NEUTROPHILS NFR BLD AUTO: 53 %
PLATELET # BLD AUTO: 204 X10E3/UL (ref 150–450)
POTASSIUM SERPL-SCNC: 4.5 MMOL/L (ref 3.5–5.2)
PROT SERPL-MCNC: 7 G/DL (ref 6–8.5)
RBC # BLD AUTO: 4.38 X10E6/UL (ref 3.77–5.28)
SODIUM SERPL-SCNC: 144 MMOL/L (ref 134–144)
TRIGL SERPL-MCNC: 112 MG/DL (ref 0–149)
VLDLC SERPL CALC-MCNC: 20 MG/DL (ref 5–40)
WBC # BLD AUTO: 6 X10E3/UL (ref 3.4–10.8)

## 2023-10-02 ENCOUNTER — TELEPHONE (OUTPATIENT)
Facility: CLINIC | Age: 58
End: 2023-10-02

## 2023-10-02 NOTE — TELEPHONE ENCOUNTER
----- Message from Madison Frye MD sent at 9/27/2023  4:47 PM EDT -----  Please inform Ms. Rcihards Mariela that she has no leakage of protein in the urine.

## 2023-10-02 NOTE — TELEPHONE ENCOUNTER
Called and spoke to pt regarding her lab work results. Pt verbalized understanding of these instructions per Dr. Blair Conley.  Thank you

## 2023-10-05 DIAGNOSIS — J44.9 CHRONIC OBSTRUCTIVE PULMONARY DISEASE, UNSPECIFIED (HCC): ICD-10-CM

## 2023-10-09 RX ORDER — FLUOCINONIDE GEL 0.5 MG/G
GEL TOPICAL
Qty: 30 G | Refills: 1 | Status: SHIPPED | OUTPATIENT
Start: 2023-10-09

## 2023-10-11 RX ORDER — ALBUTEROL SULFATE 90 UG/1
AEROSOL, METERED RESPIRATORY (INHALATION)
Qty: 6.7 EACH | Refills: 5 | Status: SHIPPED | OUTPATIENT
Start: 2023-10-11

## 2023-11-30 ENCOUNTER — TELEPHONE (OUTPATIENT)
Facility: CLINIC | Age: 58
End: 2023-11-30

## 2023-11-30 NOTE — TELEPHONE ENCOUNTER
Pt called and LM w/ on call provider requesting a call back about symptoms when they went to the ER. No other info was given.  [facility] is [phone]

## 2023-12-05 NOTE — TELEPHONE ENCOUNTER
Patient went to [facility] ER 11/27/23 for yellow nasal drainage, coughing , head pressure and yellow sputum. Denies any other symptoms. Covid test done and was negative. She stated she was given IV fluids for dehydration and nebulizer treatments. Sent home on Albuterol Neb solutions and Promethazine 6.25-15mg  every 6 hours. Symptoms still present along with diarrhea, stomach cramping and body aches. She has no appetite and does not feel like drinking much. Patient was advised to go to Urgent Care and be reevaluated.

## 2023-12-09 PROBLEM — E11.65 UNCONTROLLED TYPE 2 DIABETES MELLITUS WITH HYPERGLYCEMIA (HCC): Status: RESOLVED | Noted: 2020-10-09 | Resolved: 2023-12-09

## 2023-12-11 RX ORDER — ONDANSETRON 4 MG/1
4 TABLET, ORALLY DISINTEGRATING ORAL EVERY 8 HOURS PRN
Qty: 30 TABLET | Refills: 3 | Status: SHIPPED | OUTPATIENT
Start: 2023-12-11

## 2023-12-13 ENCOUNTER — OFFICE VISIT (OUTPATIENT)
Facility: CLINIC | Age: 58
End: 2023-12-13
Payer: MEDICARE

## 2023-12-13 VITALS
OXYGEN SATURATION: 99 % | WEIGHT: 197 LBS | BODY MASS INDEX: 34.91 KG/M2 | SYSTOLIC BLOOD PRESSURE: 134 MMHG | HEART RATE: 84 BPM | HEIGHT: 63 IN | TEMPERATURE: 97.9 F | DIASTOLIC BLOOD PRESSURE: 86 MMHG

## 2023-12-13 DIAGNOSIS — Z96.41 INSULIN PUMP IN PLACE: ICD-10-CM

## 2023-12-13 DIAGNOSIS — E08.8 DIABETES MELLITUS DUE TO UNDERLYING CONDITION, CONTROLLED, WITH COMPLICATION, WITH LONG-TERM CURRENT USE OF INSULIN (HCC): ICD-10-CM

## 2023-12-13 DIAGNOSIS — J44.9 CHRONIC OBSTRUCTIVE PULMONARY DISEASE, UNSPECIFIED COPD TYPE (HCC): ICD-10-CM

## 2023-12-13 DIAGNOSIS — G47.30 SLEEP APNEA, UNSPECIFIED TYPE: ICD-10-CM

## 2023-12-13 DIAGNOSIS — Z79.4 DIABETES MELLITUS DUE TO UNDERLYING CONDITION, CONTROLLED, WITH COMPLICATION, WITH LONG-TERM CURRENT USE OF INSULIN (HCC): ICD-10-CM

## 2023-12-13 DIAGNOSIS — Z87.891 EX-SMOKER: ICD-10-CM

## 2023-12-13 DIAGNOSIS — E87.6 HYPOKALEMIA: ICD-10-CM

## 2023-12-13 DIAGNOSIS — Z79.4 TYPE 2 DIABETES MELLITUS WITH HYPERGLYCEMIA, WITH LONG-TERM CURRENT USE OF INSULIN (HCC): ICD-10-CM

## 2023-12-13 DIAGNOSIS — Z86.73 HISTORY OF TIA (TRANSIENT ISCHEMIC ATTACK): ICD-10-CM

## 2023-12-13 DIAGNOSIS — J30.9 ALLERGIC RHINITIS, UNSPECIFIED SEASONALITY, UNSPECIFIED TRIGGER: ICD-10-CM

## 2023-12-13 DIAGNOSIS — R05.2 SUBACUTE COUGH: ICD-10-CM

## 2023-12-13 DIAGNOSIS — L72.9 SCALP CYST: ICD-10-CM

## 2023-12-13 DIAGNOSIS — E11.65 TYPE 2 DIABETES MELLITUS WITH HYPERGLYCEMIA, WITH LONG-TERM CURRENT USE OF INSULIN (HCC): ICD-10-CM

## 2023-12-13 DIAGNOSIS — K21.9 GASTROESOPHAGEAL REFLUX DISEASE WITHOUT ESOPHAGITIS: ICD-10-CM

## 2023-12-13 DIAGNOSIS — I10 ESSENTIAL (PRIMARY) HYPERTENSION: ICD-10-CM

## 2023-12-13 DIAGNOSIS — N18.31 STAGE 3A CHRONIC KIDNEY DISEASE (HCC): ICD-10-CM

## 2023-12-13 PROCEDURE — 99214 OFFICE O/P EST MOD 30 MIN: CPT | Performed by: INTERNAL MEDICINE

## 2023-12-13 PROCEDURE — 3075F SYST BP GE 130 - 139MM HG: CPT | Performed by: INTERNAL MEDICINE

## 2023-12-13 PROCEDURE — 3079F DIAST BP 80-89 MM HG: CPT | Performed by: INTERNAL MEDICINE

## 2023-12-13 RX ORDER — AMOXICILLIN AND CLAVULANATE POTASSIUM 875; 125 MG/1; MG/1
1 TABLET, FILM COATED ORAL 2 TIMES DAILY
Qty: 20 TABLET | Refills: 0 | Status: SHIPPED | OUTPATIENT
Start: 2023-12-13 | End: 2023-12-23

## 2023-12-13 ASSESSMENT — ENCOUNTER SYMPTOMS
EYES NEGATIVE: 1
CHOKING: 0
GASTROINTESTINAL NEGATIVE: 1
COUGH: 1
STRIDOR: 0
CHEST TIGHTNESS: 0
APNEA: 0
ALLERGIC/IMMUNOLOGIC NEGATIVE: 1
WHEEZING: 0
SHORTNESS OF BREATH: 0

## 2023-12-13 NOTE — PROGRESS NOTES
Jose Engle (: 1965) is a 62 y.o. female, Established patient patient, here for evaluation of the following chief complaint(s):  Follow-up Chronic Condition and Hypertension         ASSESSMENT/PLAN:  Below is the assessment and plan developed based on review of pertinent history, physical exam, labs, studies, and medications. 1. Essential (primary) hypertension  2. Type 2 diabetes mellitus with hyperglycemia, with long-term current use of insulin ([facility])  -     [facility]  3. Stage 3a chronic kidney disease ([address])  4. Allergic rhinitis, unspecified seasonality, unspecified trigger  5. History of TIA (transient ischemic attack)  6. Gastroesophageal reflux disease without esophagitis  7. Hypokalemia  -     Basic Metabolic Panel; Future  -     Magnesium; Future  8. Chronic obstructive pulmonary disease, unspecified COPD type ([address])  9. Sleep apnea, unspecified type  10. Diabetes mellitus due to underlying condition, controlled, with complication, with long-term current use of insulin ([address])  11. Ex-smoker  12. Insulin pump in place  13. Subacute cough  -     CBC with Auto Differential; Future  -     Basic Metabolic Panel; Future  14. Scalp cyst  -     Amb External Referral To Dermatology      Hypertension, controlled, continue current dose of metoprolol ER 25 mg once a day, triamterene HCTZ 37.5/25 mg once a day, she cannot take ACE or ARB causing angioedema    Type 2 diabetes mellitus, insulin-dependent, getting insulin pump and I had referred her to endocrinologist and she takes Jardiance 25 mg once a day before breakfast and insulin lispro through the pump and continue low-dose aspirin and statin. She follows ophthalmologist.  Diet low in sugar starch. She also gets Ozempic injection, also gets pioglitazone 30 mg once a day.   Last hemoglobin A1c 10.3 in 2023 with insulin pump she had consulted endocrinologist in    She says she is going to start

## 2023-12-14 ENCOUNTER — HOSPITAL ENCOUNTER (EMERGENCY)
Facility: HOSPITAL | Age: 58
Discharge: HOME | End: 2023-12-14
Attending: EMERGENCY MEDICINE
Payer: MEDICARE

## 2023-12-14 VITALS
WEIGHT: 197 LBS | RESPIRATION RATE: 16 BRPM | OXYGEN SATURATION: 99 % | TEMPERATURE: 98.2 F | HEIGHT: 63 IN | DIASTOLIC BLOOD PRESSURE: 90 MMHG | BODY MASS INDEX: 34.91 KG/M2 | HEART RATE: 68 BPM | SYSTOLIC BLOOD PRESSURE: 107 MMHG

## 2023-12-14 DIAGNOSIS — R73.9 HYPERGLYCEMIA: Primary | ICD-10-CM

## 2023-12-14 LAB
ALBUMIN SERPL-MCNC: 3.8 G/DL (ref 3.5–5)
ALBUMIN SERPL-MCNC: 4.6 G/DL (ref 3.8–4.9)
ALBUMIN/GLOB SERPL: 0.6 (ref 1.1–2.2)
ALBUMIN/GLOB SERPL: 1.2 {RATIO} (ref 1.2–2.2)
ALP SERPL-CCNC: 101 U/L (ref 45–117)
ALP SERPL-CCNC: 104 IU/L (ref 44–121)
ALT SERPL-CCNC: 14 IU/L (ref 0–32)
ALT SERPL-CCNC: ABNORMAL U/L (ref 12–78)
ANION GAP SERPL CALC-SCNC: 5 MMOL/L (ref 5–15)
APPEARANCE UR: CLEAR
AST SERPL W P-5'-P-CCNC: ABNORMAL U/L (ref 15–37)
AST SERPL-CCNC: 14 IU/L (ref 0–40)
BACTERIA URNS QL MICRO: NEGATIVE /HPF
BASOPHILS # BLD AUTO: 0 X10E3/UL (ref 0–0.2)
BASOPHILS # BLD: 0.1 K/UL (ref 0–0.1)
BASOPHILS NFR BLD AUTO: 1 %
BASOPHILS NFR BLD: 1 % (ref 0–1)
BILIRUB SERPL-MCNC: 0.4 MG/DL (ref 0–1.2)
BILIRUB SERPL-MCNC: 0.5 MG/DL (ref 0.2–1)
BILIRUB UR QL: NEGATIVE
BUN SERPL-MCNC: 19 MG/DL (ref 6–20)
BUN SERPL-MCNC: 19 MG/DL (ref 6–24)
BUN/CREAT SERPL: 12 (ref 12–20)
BUN/CREAT SERPL: 13 (ref 9–23)
CA-I BLD-MCNC: 10.3 MG/DL (ref 8.5–10.1)
CALCIUM SERPL-MCNC: 10.5 MG/DL (ref 8.7–10.2)
CHLORIDE SERPL-SCNC: 91 MMOL/L (ref 96–106)
CHLORIDE SERPL-SCNC: 94 MMOL/L (ref 97–108)
CHOLEST SERPL-MCNC: 243 MG/DL (ref 100–199)
CO2 SERPL-SCNC: 24 MMOL/L (ref 20–29)
CO2 SERPL-SCNC: 28 MMOL/L (ref 21–32)
COLOR UR: ABNORMAL
CREAT SERPL-MCNC: 1.42 MG/DL (ref 0.57–1)
CREAT SERPL-MCNC: 1.59 MG/DL (ref 0.55–1.02)
DIFFERENTIAL METHOD BLD: ABNORMAL
EGFRCR SERPLBLD CKD-EPI 2021: 43 ML/MIN/1.73
EOSINOPHIL # BLD AUTO: 0.1 X10E3/UL (ref 0–0.4)
EOSINOPHIL # BLD: 0.1 K/UL (ref 0–0.4)
EOSINOPHIL NFR BLD AUTO: 1 %
EOSINOPHIL NFR BLD: 1 % (ref 0–7)
EPITH CASTS URNS QL MICRO: ABNORMAL /LPF
ERYTHROCYTE [DISTWIDTH] IN BLOOD BY AUTOMATED COUNT: 12.9 % (ref 11.5–14.5)
ERYTHROCYTE [DISTWIDTH] IN BLOOD BY AUTOMATED COUNT: 13.1 % (ref 11.7–15.4)
GLOBULIN SER CALC-MCNC: 3.9 G/DL (ref 1.5–4.5)
GLOBULIN SER CALC-MCNC: 6.2 G/DL (ref 2–4)
GLUCOSE BLD STRIP.AUTO-MCNC: 260 MG/DL (ref 65–100)
GLUCOSE BLD STRIP.AUTO-MCNC: 492 MG/DL (ref 65–100)
GLUCOSE SERPL-MCNC: 450 MG/DL (ref 65–100)
GLUCOSE SERPL-MCNC: 489 MG/DL (ref 70–99)
GLUCOSE UR STRIP.AUTO-MCNC: >500 MG/DL
HCT VFR BLD AUTO: 44.1 % (ref 35–47)
HCT VFR BLD AUTO: 45.4 % (ref 34–46.6)
HDLC SERPL-MCNC: 73 MG/DL
HGB BLD-MCNC: 14.6 G/DL (ref 11.1–15.9)
HGB BLD-MCNC: 15 G/DL (ref 11.5–16)
HGB UR QL STRIP: ABNORMAL
IMM GRANULOCYTES # BLD AUTO: 0 K/UL (ref 0–0.04)
IMM GRANULOCYTES # BLD AUTO: 0 X10E3/UL (ref 0–0.1)
IMM GRANULOCYTES NFR BLD AUTO: 0 %
IMM GRANULOCYTES NFR BLD AUTO: 0 % (ref 0–0.5)
KETONES UR QL STRIP.AUTO: NEGATIVE MG/DL
LDLC SERPL CALC-MCNC: 128 MG/DL (ref 0–99)
LEUKOCYTE ESTERASE UR QL STRIP.AUTO: NEGATIVE
LYMPHOCYTES # BLD AUTO: 2.2 X10E3/UL (ref 0.7–3.1)
LYMPHOCYTES # BLD: 3.1 K/UL (ref 0.8–3.5)
LYMPHOCYTES NFR BLD AUTO: 31 %
LYMPHOCYTES NFR BLD: 34 % (ref 12–49)
MAGNESIUM SERPL-MCNC: 2.1 MG/DL (ref 1.6–2.3)
MAGNESIUM SERPL-MCNC: 2.2 MG/DL (ref 1.6–2.4)
MAGNESIUM SERPL-MCNC: NORMAL MG/DL (ref 1.6–2.4)
MAGNESIUM SERPL-MCNC: NORMAL MG/DL (ref 1.6–2.4)
MCH RBC QN AUTO: 27.9 PG (ref 26–34)
MCH RBC QN AUTO: 28.1 PG (ref 26.6–33)
MCHC RBC AUTO-ENTMCNC: 32.2 G/DL (ref 31.5–35.7)
MCHC RBC AUTO-ENTMCNC: 34 G/DL (ref 30–36.5)
MCV RBC AUTO: 82.1 FL (ref 80–99)
MCV RBC AUTO: 88 FL (ref 79–97)
MONOCYTES # BLD AUTO: 0.5 X10E3/UL (ref 0.1–0.9)
MONOCYTES # BLD: 0.6 K/UL (ref 0–1)
MONOCYTES NFR BLD AUTO: 7 %
MONOCYTES NFR BLD: 7 % (ref 5–13)
NEUTROPHILS # BLD AUTO: 4.3 X10E3/UL (ref 1.4–7)
NEUTROPHILS NFR BLD AUTO: 60 %
NEUTS SEG # BLD: 5.2 K/UL (ref 1.8–8)
NEUTS SEG NFR BLD: 57 % (ref 32–75)
NITRITE UR QL STRIP.AUTO: NEGATIVE
NRBC # BLD: 0 K/UL (ref 0–0.01)
NRBC BLD-RTO: 0 PER 100 WBC
PERFORMED BY:: ABNORMAL
PERFORMED BY:: ABNORMAL
PH UR STRIP: 5 (ref 5–8)
PLATELET # BLD AUTO: 252 X10E3/UL (ref 150–450)
PLATELET # BLD AUTO: 286 K/UL (ref 150–400)
PMV BLD AUTO: 13.1 FL (ref 8.9–12.9)
POTASSIUM SERPL-SCNC: 3.6 MMOL/L (ref 3.5–5.1)
POTASSIUM SERPL-SCNC: 3.9 MMOL/L (ref 3.5–5.2)
POTASSIUM SERPL-SCNC: ABNORMAL MMOL/L (ref 3.5–5.1)
POTASSIUM SERPL-SCNC: NORMAL MMOL/L (ref 3.5–5.1)
PROT SERPL-MCNC: 10 G/DL (ref 6.4–8.2)
PROT SERPL-MCNC: 8.5 G/DL (ref 6–8.5)
PROT UR STRIP-MCNC: NEGATIVE MG/DL
RBC # BLD AUTO: 5.19 X10E6/UL (ref 3.77–5.28)
RBC # BLD AUTO: 5.37 M/UL (ref 3.8–5.2)
RBC #/AREA URNS HPF: ABNORMAL /HPF (ref 0–5)
SODIUM SERPL-SCNC: 127 MMOL/L (ref 136–145)
SODIUM SERPL-SCNC: 135 MMOL/L (ref 134–144)
SP GR UR REFRACTOMETRY: 1.02 (ref 1–1.03)
TRIGL SERPL-MCNC: 241 MG/DL (ref 0–149)
UROBILINOGEN UR QL STRIP.AUTO: 0.1 EU/DL (ref 0.1–1)
VLDLC SERPL CALC-MCNC: 42 MG/DL (ref 5–40)
WBC # BLD AUTO: 7 X10E3/UL (ref 3.4–10.8)
WBC # BLD AUTO: 9 K/UL (ref 3.6–11)
WBC URNS QL MICRO: ABNORMAL /HPF (ref 0–4)

## 2023-12-14 PROCEDURE — 96360 HYDRATION IV INFUSION INIT: CPT

## 2023-12-14 PROCEDURE — 81001 URINALYSIS AUTO W/SCOPE: CPT

## 2023-12-14 PROCEDURE — 99284 EMERGENCY DEPT VISIT MOD MDM: CPT

## 2023-12-14 PROCEDURE — 84132 ASSAY OF SERUM POTASSIUM: CPT

## 2023-12-14 PROCEDURE — 80053 COMPREHEN METABOLIC PANEL: CPT

## 2023-12-14 PROCEDURE — 2580000003 HC RX 258: Performed by: EMERGENCY MEDICINE

## 2023-12-14 PROCEDURE — 96361 HYDRATE IV INFUSION ADD-ON: CPT

## 2023-12-14 PROCEDURE — 85025 COMPLETE CBC W/AUTO DIFF WBC: CPT

## 2023-12-14 PROCEDURE — 83735 ASSAY OF MAGNESIUM: CPT

## 2023-12-14 PROCEDURE — 36415 COLL VENOUS BLD VENIPUNCTURE: CPT

## 2023-12-14 PROCEDURE — 82962 GLUCOSE BLOOD TEST: CPT

## 2023-12-14 PROCEDURE — 80048 BASIC METABOLIC PNL TOTAL CA: CPT

## 2023-12-14 RX ORDER — SODIUM CHLORIDE, SODIUM LACTATE, POTASSIUM CHLORIDE, AND CALCIUM CHLORIDE .6; .31; .03; .02 G/100ML; G/100ML; G/100ML; G/100ML
2000 INJECTION, SOLUTION INTRAVENOUS
Status: COMPLETED | OUTPATIENT
Start: 2023-12-14 | End: 2023-12-14

## 2023-12-14 RX ADMIN — SODIUM CHLORIDE, POTASSIUM CHLORIDE, SODIUM LACTATE AND CALCIUM CHLORIDE 2000 ML: 600; 310; 30; 20 INJECTION, SOLUTION INTRAVENOUS at 18:07

## 2023-12-14 ASSESSMENT — PAIN SCALES - GENERAL
PAINLEVEL_OUTOF10: 0
PAINLEVEL_OUTOF10: 0

## 2023-12-14 ASSESSMENT — LIFESTYLE VARIABLES
HOW OFTEN DO YOU HAVE A DRINK CONTAINING ALCOHOL: NEVER
HOW MANY STANDARD DRINKS CONTAINING ALCOHOL DO YOU HAVE ON A TYPICAL DAY: PATIENT DOES NOT DRINK

## 2023-12-14 ASSESSMENT — PAIN - FUNCTIONAL ASSESSMENT: PAIN_FUNCTIONAL_ASSESSMENT: 0-10

## 2023-12-15 NOTE — DISCHARGE INSTRUCTIONS
- 20 mg/dL    Creatinine 1.59 (H) 0.55 - 1.02 mg/dL    Bun/Cre Ratio 12 12 - 20      Est, Glom Filt Rate 37 (L) >60 ml/min/1.73m2    Calcium 10.3 (H) 8.5 - 10.1 mg/dL    Total Bilirubin 0.5 0.2 - 1.0 mg/dL    AST Hemolyzed, Recollection Recommended 15 - 37 U/L    ALT Hemolyzed, Recollection Recommended 12 - 78 U/L    Alk Phosphatase 101 45 - 117 U/L    Total Protein 10.0 (H) 6.4 - 8.2 g/dL    Albumin 3.8 3.5 - 5.0 g/dL    Globulin 6.2 (H) 2.0 - 4.0 g/dL    Albumin/Globulin Ratio 0.6 (L) 1.1 - 2.2     Magnesium    Collection Time: 12/14/23  5:53 PM   Result Value Ref Range    Magnesium Hemolyzed, Recollection Recommended 1.6 - 2.4 mg/dL   Potassium    Collection Time: 12/14/23  8:14 PM   Result Value Ref Range    Potassium Hemolyzed, Recollection Recommended 3.5 - 5.1 mmol/L   Magnesium    Collection Time: 12/14/23  8:14 PM   Result Value Ref Range    Magnesium Hemolyzed, Recollection Recommended 1.6 - 2.4 mg/dL   POCT Glucose    Collection Time: 12/14/23  9:50 PM   Result Value Ref Range    POC Glucose 260 (H) 65 - 100 mg/dL    Performed by: Flip Rojo    Potassium    Collection Time: 12/14/23  9:53 PM   Result Value Ref Range    Potassium 3.6 3.5 - 5.1 mmol/L   Magnesium    Collection Time: 12/14/23  9:53 PM   Result Value Ref Range    Magnesium 2.2 1.6 - 2.4 mg/dL       Radiologic Studies  No orders to display     ------------------------------------------------------------------------------------------------------------  The exam and treatment you received in the Emergency Department were for an urgent problem and are not intended as complete care. It is important that you follow-up with a doctor, nurse practitioner, or physician assistant to:  (1) confirm your diagnosis,  (2) re-evaluation of changes in your illness and treatment, and  (3) for ongoing care. Please take your discharge instructions with you when you go to your follow-up appointment.      If you have any problem arranging a follow-up

## 2023-12-15 NOTE — ED PROVIDER NOTES
[facility] EMERGENCY DEPT  EMERGENCY DEPARTMENT HISTORY AND PHYSICAL EXAM      Date: 12/14/2023  Patient Name: Kandice Hill  MRN: [id number]  [address] [facility] [place]: 1965  Date of evaluation: 12/14/2023  Provider: Garrison Cooley DO   Note Started: 8:00 PM EST 12/14/23    HISTORY OF PRESENT ILLNESS     Chief Complaint   Patient presents with    Hyperglycemia       History Provided By: Patient    HPI: Kandice Hill is a 62 y.o. female with past medical history significant for insulin-dependent diabetes presenting to the emergency department for evaluation of hyperglycemia. Was seen by her primary care physician today who referred patient to the emergency department with glucose greater than 400. Patient reports associated polyuria, polydipsia, dry mouth and blurry vision. Reports that she removed her insulin pump last week. He has been injecting short acting insulin, however does not have any Lantus at home.     PAST MEDICAL HISTORY   Past Medical History:  Past Medical History:   Diagnosis Date    Abnormal weight gain 09/09/2020    Anemia     Asthma     Cellulitis and abscess of lower extremity 09/09/2020    Chronic back pain 12/08/2020    Chronic pain     Chronic sinusitis 09/08/2020    Contact dermatitis and eczema due to cause     COPD (chronic obstructive pulmonary disease) ([address] [facility]) 09/09/2020    Depression     Diabetes ([facility] [address] [facility])     DM (diabetes mellitus), type 2, uncontrolled 09/08/2020    Dyslipidemia 09/09/2020    Dysphagia 09/09/2020    Essential hypertension 09/08/2020    GERD (gastroesophageal reflux disease) 09/08/2020    Headache     Hemorrhoids 09/08/2020    HIV (human immunodeficiency virus infection) ([address] [facility]) 2008    Hypertension     Hypokalemia 09/08/2020    Insomnia 09/08/2020    Mononeuritis 09/08/2020    Neck pain 09/09/2020    Noncompliance with treatment 09/08/2020    Pain in throat 09/08/2020    Pruritus of skin 09/08/2020    TIA (transient ischemic attack) 09/08/2020    3/2020    Tick bite 09/08/2020

## 2024-01-02 RX ORDER — ATORVASTATIN CALCIUM 20 MG/1
20 TABLET, FILM COATED ORAL DAILY
Qty: 30 TABLET | Refills: 5 | Status: SHIPPED | OUTPATIENT
Start: 2024-01-02

## 2024-01-15 RX ORDER — CALCIUM CARBONATE 500(1250)
1 TABLET ORAL EVERY MORNING
Qty: 60 TABLET | Refills: 0 | Status: SHIPPED | OUTPATIENT
Start: 2024-01-15

## 2024-02-26 RX ORDER — HYDROXYZINE HYDROCHLORIDE 10 MG/1
TABLET, FILM COATED ORAL
Qty: 90 TABLET | Refills: 5 | Status: SHIPPED | OUTPATIENT
Start: 2024-02-26

## 2024-02-26 RX ORDER — MONTELUKAST SODIUM 10 MG/1
10 TABLET ORAL DAILY
Qty: 30 TABLET | Refills: 5 | Status: SHIPPED | OUTPATIENT
Start: 2024-02-26

## 2024-03-16 PROBLEM — R19.7 DIARRHEA: Status: RESOLVED | Noted: 2020-09-10 | Resolved: 2024-03-16

## 2024-03-16 PROBLEM — Z87.448 HISTORY OF RENAL INSUFFICIENCY: Status: RESOLVED | Noted: 2021-08-25 | Resolved: 2024-03-16

## 2024-03-16 PROBLEM — M54.2 NECK PAIN: Status: RESOLVED | Noted: 2020-09-09 | Resolved: 2024-03-16

## 2024-03-16 PROBLEM — Z88.9 HX OF SEASONAL ALLERGIES: Status: RESOLVED | Noted: 2022-04-21 | Resolved: 2024-03-16

## 2024-03-20 ENCOUNTER — OFFICE VISIT (OUTPATIENT)
Facility: CLINIC | Age: 59
End: 2024-03-20

## 2024-03-20 VITALS
DIASTOLIC BLOOD PRESSURE: 90 MMHG | HEART RATE: 112 BPM | HEIGHT: 63 IN | OXYGEN SATURATION: 98 % | SYSTOLIC BLOOD PRESSURE: 128 MMHG | BODY MASS INDEX: 31.89 KG/M2 | WEIGHT: 180 LBS | RESPIRATION RATE: 16 BRPM

## 2024-03-20 DIAGNOSIS — E78.2 MIXED HYPERLIPIDEMIA: ICD-10-CM

## 2024-03-20 DIAGNOSIS — B20 HUMAN IMMUNODEFICIENCY VIRUS (HIV) DISEASE (HCC): ICD-10-CM

## 2024-03-20 DIAGNOSIS — E08.8 DIABETES MELLITUS DUE TO UNDERLYING CONDITION, CONTROLLED, WITH COMPLICATION, WITH LONG-TERM CURRENT USE OF INSULIN (HCC): ICD-10-CM

## 2024-03-20 DIAGNOSIS — Z09 HOSPITAL DISCHARGE FOLLOW-UP: ICD-10-CM

## 2024-03-20 DIAGNOSIS — Z59.89 DOES NOT HAVE HEALTH INSURANCE: ICD-10-CM

## 2024-03-20 DIAGNOSIS — N18.31 STAGE 3A CHRONIC KIDNEY DISEASE (HCC): ICD-10-CM

## 2024-03-20 DIAGNOSIS — Z79.4 DIABETES MELLITUS DUE TO UNDERLYING CONDITION, CONTROLLED, WITH COMPLICATION, WITH LONG-TERM CURRENT USE OF INSULIN (HCC): ICD-10-CM

## 2024-03-20 DIAGNOSIS — J44.9 CHRONIC OBSTRUCTIVE PULMONARY DISEASE, UNSPECIFIED COPD TYPE (HCC): ICD-10-CM

## 2024-03-20 DIAGNOSIS — E04.1 THYROID NODULE: ICD-10-CM

## 2024-03-20 DIAGNOSIS — E11.65 TYPE 2 DIABETES MELLITUS WITH HYPERGLYCEMIA, WITH LONG-TERM CURRENT USE OF INSULIN (HCC): ICD-10-CM

## 2024-03-20 DIAGNOSIS — Z79.4 TYPE 2 DIABETES MELLITUS WITH HYPERGLYCEMIA, WITH LONG-TERM CURRENT USE OF INSULIN (HCC): ICD-10-CM

## 2024-03-20 DIAGNOSIS — G47.30 SLEEP APNEA, UNSPECIFIED TYPE: ICD-10-CM

## 2024-03-20 DIAGNOSIS — I10 ESSENTIAL (PRIMARY) HYPERTENSION: ICD-10-CM

## 2024-03-20 PROBLEM — Z59.71 DOES NOT HAVE HEALTH INSURANCE: Status: ACTIVE | Noted: 2024-03-20

## 2024-03-20 LAB
GLUCOSE, POC: 325 MG/DL
HBA1C MFR BLD: 11.3 %

## 2024-03-20 RX ORDER — ATORVASTATIN CALCIUM 20 MG/1
20 TABLET, FILM COATED ORAL NIGHTLY
Qty: 30 TABLET | Refills: 5 | Status: SHIPPED | OUTPATIENT
Start: 2024-03-20

## 2024-03-20 RX ORDER — ALBUTEROL SULFATE 90 UG/1
AEROSOL, METERED RESPIRATORY (INHALATION)
Qty: 6.7 EACH | Refills: 5 | Status: SHIPPED | OUTPATIENT
Start: 2024-03-20

## 2024-03-20 RX ORDER — OMEPRAZOLE 40 MG/1
40 CAPSULE, DELAYED RELEASE ORAL DAILY
Qty: 30 CAPSULE | Refills: 5 | Status: SHIPPED | OUTPATIENT
Start: 2024-03-20

## 2024-03-20 RX ORDER — MONTELUKAST SODIUM 10 MG/1
10 TABLET ORAL DAILY
Qty: 30 TABLET | Refills: 5 | Status: SHIPPED | OUTPATIENT
Start: 2024-03-20

## 2024-03-20 RX ORDER — BUDESONIDE AND FORMOTEROL FUMARATE DIHYDRATE 160; 4.5 UG/1; UG/1
2 AEROSOL RESPIRATORY (INHALATION) 2 TIMES DAILY
Qty: 10.2 EACH | Refills: 5 | Status: SHIPPED | OUTPATIENT
Start: 2024-03-20

## 2024-03-20 RX ORDER — TRIAMTERENE AND HYDROCHLOROTHIAZIDE 37.5; 25 MG/1; MG/1
1 CAPSULE ORAL EVERY MORNING
Qty: 30 CAPSULE | Refills: 5 | Status: SHIPPED | OUTPATIENT
Start: 2024-03-20

## 2024-03-20 RX ORDER — PIOGLITAZONEHYDROCHLORIDE 30 MG/1
30 TABLET ORAL
Qty: 30 TABLET | Refills: 5 | Status: SHIPPED | OUTPATIENT
Start: 2024-03-20

## 2024-03-20 RX ORDER — CALCIUM CARBONATE 500(1250)
1 TABLET ORAL EVERY MORNING
Qty: 60 TABLET | Refills: 0 | Status: SHIPPED | OUTPATIENT
Start: 2024-03-20

## 2024-03-20 RX ORDER — METOPROLOL SUCCINATE 25 MG/1
25 TABLET, EXTENDED RELEASE ORAL DAILY
Qty: 30 TABLET | Refills: 5 | Status: SHIPPED | OUTPATIENT
Start: 2024-03-20

## 2024-03-20 RX ORDER — HYDROXYZINE HYDROCHLORIDE 10 MG/1
TABLET, FILM COATED ORAL
Qty: 90 TABLET | Refills: 5 | Status: SHIPPED | OUTPATIENT
Start: 2024-03-20

## 2024-03-20 RX ORDER — FLUTICASONE PROPIONATE 50 MCG
2 SPRAY, SUSPENSION (ML) NASAL DAILY
Qty: 16 G | Refills: 5 | Status: SHIPPED | OUTPATIENT
Start: 2024-03-20

## 2024-03-20 RX ORDER — FLUTICASONE PROPIONATE 50 MCG
2 SPRAY, SUSPENSION (ML) NASAL DAILY
Qty: 16 G | Refills: 5 | Status: SHIPPED | OUTPATIENT
Start: 2024-03-20 | End: 2024-03-20 | Stop reason: SDUPTHER

## 2024-03-20 RX ORDER — INSULIN GLARGINE 100 [IU]/ML
20 INJECTION, SOLUTION SUBCUTANEOUS NIGHTLY
Qty: 5 ADJUSTABLE DOSE PRE-FILLED PEN SYRINGE | Refills: 3 | Status: SHIPPED | OUTPATIENT
Start: 2024-03-20

## 2024-03-20 RX ORDER — ASPIRIN 81 MG/1
81 TABLET ORAL EVERY MORNING
Qty: 30 TABLET | Refills: 5 | Status: SHIPPED | OUTPATIENT
Start: 2024-03-20

## 2024-03-20 SDOH — ECONOMIC STABILITY - INCOME SECURITY: OTHER PROBLEMS RELATED TO HOUSING AND ECONOMIC CIRCUMSTANCES: Z59.89

## 2024-03-20 ASSESSMENT — PATIENT HEALTH QUESTIONNAIRE - PHQ9
7. TROUBLE CONCENTRATING ON THINGS, SUCH AS READING THE NEWSPAPER OR WATCHING TELEVISION: NOT AT ALL
3. TROUBLE FALLING OR STAYING ASLEEP: NOT AT ALL
SUM OF ALL RESPONSES TO PHQ QUESTIONS 1-9: 2
8. MOVING OR SPEAKING SO SLOWLY THAT OTHER PEOPLE COULD HAVE NOTICED. OR THE OPPOSITE, BEING SO FIGETY OR RESTLESS THAT YOU HAVE BEEN MOVING AROUND A LOT MORE THAN USUAL: NOT AT ALL
9. THOUGHTS THAT YOU WOULD BE BETTER OFF DEAD, OR OF HURTING YOURSELF: NOT AT ALL
SUM OF ALL RESPONSES TO PHQ QUESTIONS 1-9: 2
SUM OF ALL RESPONSES TO PHQ9 QUESTIONS 1 & 2: 1
5. POOR APPETITE OR OVEREATING: SEVERAL DAYS
2. FEELING DOWN, DEPRESSED OR HOPELESS: SEVERAL DAYS
SUM OF ALL RESPONSES TO PHQ QUESTIONS 1-9: 2
4. FEELING TIRED OR HAVING LITTLE ENERGY: NOT AT ALL
10. IF YOU CHECKED OFF ANY PROBLEMS, HOW DIFFICULT HAVE THESE PROBLEMS MADE IT FOR YOU TO DO YOUR WORK, TAKE CARE OF THINGS AT HOME, OR GET ALONG WITH OTHER PEOPLE: NOT DIFFICULT AT ALL
SUM OF ALL RESPONSES TO PHQ QUESTIONS 1-9: 2
1. LITTLE INTEREST OR PLEASURE IN DOING THINGS: NOT AT ALL
6. FEELING BAD ABOUT YOURSELF - OR THAT YOU ARE A FAILURE OR HAVE LET YOURSELF OR YOUR FAMILY DOWN: NOT AT ALL

## 2024-03-20 ASSESSMENT — ENCOUNTER SYMPTOMS
ALLERGIC/IMMUNOLOGIC NEGATIVE: 1
RESPIRATORY NEGATIVE: 1
EYES NEGATIVE: 1

## 2024-03-20 NOTE — PATIENT INSTRUCTIONS
Department of   [employer] of  contacts: https://www.dss.[facility]/localagency/index.cgi    [place], [place] Affordability Programs  https://www.a.gov/public-utilities/billing#irovbpde-pjcrukzui-0430-1  Call:  846.749.7360   Email:  sujit@[facility].gov  Programs available:  Equal Monthly Payment Plan, [employer] Heat Program, [employer] Water Program, [employer] Water Conservation Program, Senior Assistance, Other Fuel Assistance Programs, PromisePay Payment Plans, Low-Income Household Water Assistance Program (LIHWAP)    [place] Low-Income Household Water Assistance Program (LIHWAP)  Helps [place] residents pay their water and wastewater bills  Apply online:  https://"[employer]"/lihwap  Questions:  Call Ashley at [phone] (M-S 7am-7pm) or email: support@"[employer]"

## 2024-03-20 NOTE — PROGRESS NOTES
Chief Complaint   Patient presents with    Follow-up Chronic Condition    Nausea & Vomiting     Poor appetite x 3 weeks              /88 (Site: Left Upper Arm, Position: Sitting)   Pulse (!) 110   Resp 16   Ht 1.6 m (5' 3\")   Wt 81.6 kg (180 lb)   SpO2 98%   BMI 31.89 kg/m²           \"Have you been to the ER, urgent care clinic since your last visit?  Hospitalized since your last visit?\"    YES - When: approximately 10 days ago.  Where and Why: [facility]D.    “Have you seen or consulted any other health care providers outside of [facility] since your last visit?”    NO            Click Here for Release of Records Request

## 2024-03-20 NOTE — PROGRESS NOTES
Kyra Castelan (: 1965) is a 59 y.o. female, Established patient patient, here for evaluation of the following chief complaint(s):  Follow-up Chronic Condition and Nausea & Vomiting (Poor appetite x 3 weeks )         ASSESSMENT/PLAN:  Below is the assessment and plan developed based on review of pertinent history, physical exam, labs, studies, and medications.      1. Hospital discharge follow-up  2. Essential (primary) hypertension  -     [facility] - Ambulatory Care Management  -     [facility] - Referral to Social Work  3. Does not have health insurance  -     [facility] - Ambulatory Care Management  -     [facility] - Referral to Social Work  4. Diabetes mellitus due to underlying condition, controlled, with complication, with long-term current use of insulin ([facility])  -     [facility] - Ambulatory Care Management  -     [facility] - Referral to Social Work  -     [facility]  5. Type 2 diabetes mellitus with hyperglycemia, with long-term current use of insulin ([facility])  -     AMB POC HEMOGLOBIN A1C  -     AMB POC GLUCOSE BLOOD, BY GLUCOSE MONITORING DEVICE  -     [facility] - Ambulatory Care Management  -     [facility] - Referral to Social Work  -     [facility]  6. Human immunodeficiency virus (HIV) disease ([facility])  -     [facility] - Ambulatory Care Management  -     [facility] - Referral to Social Work  7. Chronic obstructive pulmonary disease, unspecified COPD type ([facility])  -     [facility] - Ambulatory Care Management  -     [facility] - Referral to Social Work  8. Stage 3a chronic kidney disease ([facility])  -     [facility] - Ambulatory Care Management  -     [facility] - Referral to Social Work  -     Basic Metabolic Panel; Future  -     CBC with Auto Differential; Future  9. Mixed hyperlipidemia  -     [facility] - Ambulatory Care Management  -     [facility] - Referral to Social Work  10. Sleep apnea, unspecified type  -     [facility] - Ambulatory Care Management  -     [facility] - Referral to Social Work  11. Chronic

## 2024-03-21 NOTE — PROGRESS NOTES
1. \"Have you been to the ER, urgent care clinic since your last visit? Hospitalized since your last visit? \" Yes When: 10/22 Where: [facility] Reason for visit: covid    2. \"Have you seen or consulted any other health care providers outside of the [address] [place] [address] since your last visit? \" No     3. For patients aged 39-70: Has the patient had a colonoscopy / FIT/ Cologuard? Yes - no Care Gap present      If the patient is female:    4. For patients aged 41-77: Has the patient had a mammogram within the past 2 years? Yes - no Care Gap present      5. For patients aged 21-65: Has the patient had a pap smear?  No      Chief Complaint   Patient presents with    Follow Up Chronic Condition    Cholesterol Problem    Hypertension    Diabetes     Glu 224        Visit Vitals  /88 (BP 1 Location: Left upper arm, BP Patient Position: Sitting, BP Cuff Size: Adult)   Pulse 99   Temp 97.1 °F (36.2 °C) (Temporal)   Resp 17   Ht 5' 3\" (1.6 m)   Wt 190 lb (86.2 kg)   SpO2 97%   BMI 33.66 kg/m² [Alert] : alert [Oriented x 3] : ~L oriented x 3 [Well Nourished] : well nourished [No Visual Lymphadenopathy] : no visual  lymphadenopathy [Conjunctiva Non-injected] : conjunctiva non-injected [No Edema] : no edema [No Clubbing] : no clubbing [No Bromhidrosis] : no bromhidrosis [No Chromhidrosis] : no chromhidrosis [FreeTextEntry3] : Closed comedones and few erythematous papules on the forehead and cheeks. Hyperpigmented macules across the cheeks and jawline

## 2024-03-23 LAB
BASOPHILS # BLD AUTO: 0 X10E3/UL (ref 0–0.2)
BASOPHILS NFR BLD AUTO: 0 %
BUN SERPL-MCNC: 11 MG/DL (ref 6–24)
BUN/CREAT SERPL: 12 (ref 9–23)
CALCIUM SERPL-MCNC: 8.9 MG/DL (ref 8.7–10.2)
CHLORIDE SERPL-SCNC: 94 MMOL/L (ref 96–106)
CO2 SERPL-SCNC: 25 MMOL/L (ref 20–29)
CREAT SERPL-MCNC: 0.9 MG/DL (ref 0.57–1)
EGFRCR SERPLBLD CKD-EPI 2021: 74 ML/MIN/1.73
EOSINOPHIL # BLD AUTO: 0.1 X10E3/UL (ref 0–0.4)
EOSINOPHIL NFR BLD AUTO: 1 %
ERYTHROCYTE [DISTWIDTH] IN BLOOD BY AUTOMATED COUNT: 13.3 % (ref 11.7–15.4)
GLUCOSE SERPL-MCNC: 396 MG/DL (ref 70–99)
HCT VFR BLD AUTO: 41.6 % (ref 34–46.6)
HGB BLD-MCNC: 13.3 G/DL (ref 11.1–15.9)
IMM GRANULOCYTES # BLD AUTO: 0 X10E3/UL (ref 0–0.1)
IMM GRANULOCYTES NFR BLD AUTO: 0 %
LYMPHOCYTES # BLD AUTO: 2.5 X10E3/UL (ref 0.7–3.1)
LYMPHOCYTES NFR BLD AUTO: 38 %
MCH RBC QN AUTO: 26.8 PG (ref 26.6–33)
MCHC RBC AUTO-ENTMCNC: 32 G/DL (ref 31.5–35.7)
MCV RBC AUTO: 84 FL (ref 79–97)
MONOCYTES # BLD AUTO: 0.6 X10E3/UL (ref 0.1–0.9)
MONOCYTES NFR BLD AUTO: 10 %
NEUTROPHILS # BLD AUTO: 3.4 X10E3/UL (ref 1.4–7)
NEUTROPHILS NFR BLD AUTO: 51 %
PLATELET # BLD AUTO: 172 X10E3/UL (ref 150–450)
POTASSIUM SERPL-SCNC: 4 MMOL/L (ref 3.5–5.2)
RBC # BLD AUTO: 4.96 X10E6/UL (ref 3.77–5.28)
SODIUM SERPL-SCNC: 134 MMOL/L (ref 134–144)
WBC # BLD AUTO: 6.6 X10E3/UL (ref 3.4–10.8)

## 2024-04-17 ENCOUNTER — HOSPITAL ENCOUNTER (OUTPATIENT)
Facility: HOSPITAL | Age: 59
Discharge: HOME | End: 2024-04-20
Attending: INTERNAL MEDICINE

## 2024-04-17 DIAGNOSIS — E04.1 THYROID NODULE: ICD-10-CM

## 2024-04-17 PROCEDURE — 76536 US EXAM OF HEAD AND NECK: CPT

## 2024-04-18 ENCOUNTER — TELEPHONE (OUTPATIENT)
Facility: CLINIC | Age: 59
End: 2024-04-18

## 2024-04-18 NOTE — TELEPHONE ENCOUNTER
Patient was advised of referral made at [facility] ear/nose and throat in [place] she communicated understanding. STEPAN ARMENTA

## 2024-05-10 RX ORDER — BUDESONIDE AND FORMOTEROL FUMARATE DIHYDRATE 160; 4.5 UG/1; UG/1
2 AEROSOL RESPIRATORY (INHALATION) 2 TIMES DAILY
Qty: 10.2 EACH | Refills: 5 | Status: SHIPPED | OUTPATIENT
Start: 2024-05-10

## 2024-05-10 NOTE — TELEPHONE ENCOUNTER
----- Message from Nila Lawson sent at 5/9/2024 11:51 AM EDT -----  Subject: Refill Request    QUESTIONS  Name of Medication? budesonide-formoterol (SYMBICORT) 160-4.5 MCG/ACT AERO  Patient-reported dosage and instructions? 160  How many days do you have left? 0  Preferred Pharmacy? [id number]  Pharmacy phone number (if available)? [phone]  ---------------------------------------------------------------------------  --------------  CALL BACK INFO  What is the best way for the office to contact you? OK to leave message on   voicemail  Preferred Call Back Phone Number? [phone]  ---------------------------------------------------------------------------  --------------  SCRIPT ANSWERS  Relationship to Patient? Self

## 2024-05-16 ENCOUNTER — TELEPHONE (OUTPATIENT)
Facility: CLINIC | Age: 59
End: 2024-05-16

## 2024-05-16 RX ORDER — INSULIN GLARGINE 100 [IU]/ML
35 INJECTION, SOLUTION SUBCUTANEOUS NIGHTLY
Qty: 5 ADJUSTABLE DOSE PRE-FILLED PEN SYRINGE | Refills: 3 | Status: SHIPPED | OUTPATIENT
Start: 2024-05-16

## 2024-05-16 RX ORDER — NEEDLES, DISPOSABLE 25GX5/8"
NEEDLE, DISPOSABLE MISCELLANEOUS
Qty: 360 EACH | Refills: 3 | Status: SHIPPED | OUTPATIENT
Start: 2024-05-16

## 2024-05-16 RX ORDER — INSULIN LISPRO 100 [IU]/ML
25 INJECTION, SOLUTION INTRAVENOUS; SUBCUTANEOUS
Qty: 5 EACH | Refills: 3 | Status: SHIPPED | OUTPATIENT
Start: 2024-05-16

## 2024-05-16 NOTE — TELEPHONE ENCOUNTER
Spoke with pt and she stated that she takes 25 units of Humalog TID before meals and 35 units of Lantus at bedtime. She needs refills of these and the needles

## 2024-05-16 NOTE — TELEPHONE ENCOUNTER
----- Message from Nila Lawson sent at 5/9/2024 11:56 AM EDT -----  Subject: Message to Provider    QUESTIONS  Information for Provider? Patient called stating she needs a refill on   needles for the humalog pen  ---------------------------------------------------------------------------  --------------  CALL BACK INFO  [phone]; OK to leave message on voicemail  ---------------------------------------------------------------------------  --------------  SCRIPT ANSWERS  Relationship to Patient? Self

## 2024-05-30 DIAGNOSIS — N95.1 SYMPTOMATIC MENOPAUSAL OR FEMALE CLIMACTERIC STATES: ICD-10-CM

## 2024-05-30 RX ORDER — ESTRADIOL 1 MG/1
1 TABLET ORAL DAILY
Qty: 90 TABLET | Refills: 0 | Status: SHIPPED | OUTPATIENT
Start: 2024-05-30

## 2024-05-30 RX ORDER — HYDROXYZINE HYDROCHLORIDE 10 MG/1
TABLET, FILM COATED ORAL
Qty: 90 TABLET | Refills: 2 | Status: SHIPPED | OUTPATIENT
Start: 2024-05-30

## 2024-06-04 ENCOUNTER — OFFICE VISIT (OUTPATIENT)
Age: 59
End: 2024-06-04
Payer: MEDICAID

## 2024-06-04 VITALS
RESPIRATION RATE: 18 BRPM | WEIGHT: 201 LBS | SYSTOLIC BLOOD PRESSURE: 136 MMHG | TEMPERATURE: 96.9 F | HEART RATE: 90 BPM | OXYGEN SATURATION: 90 % | DIASTOLIC BLOOD PRESSURE: 88 MMHG | BODY MASS INDEX: 35.61 KG/M2 | HEIGHT: 63 IN

## 2024-06-04 DIAGNOSIS — E11.65 TYPE 2 DIABETES MELLITUS WITH HYPERGLYCEMIA, WITH LONG-TERM CURRENT USE OF INSULIN (HCC): Primary | ICD-10-CM

## 2024-06-04 DIAGNOSIS — E66.01 CLASS 2 SEVERE OBESITY DUE TO EXCESS CALORIES WITH SERIOUS COMORBIDITY AND BODY MASS INDEX (BMI) OF 35.0 TO 35.9 IN ADULT (HCC): ICD-10-CM

## 2024-06-04 DIAGNOSIS — Z79.4 TYPE 2 DIABETES MELLITUS WITH HYPERGLYCEMIA, WITH LONG-TERM CURRENT USE OF INSULIN (HCC): Primary | ICD-10-CM

## 2024-06-04 DIAGNOSIS — E78.2 MIXED HYPERLIPIDEMIA: ICD-10-CM

## 2024-06-04 DIAGNOSIS — E04.1 THYROID NODULE: ICD-10-CM

## 2024-06-04 DIAGNOSIS — I10 PRIMARY HYPERTENSION: ICD-10-CM

## 2024-06-04 LAB — GLUCOSE, POC: 85 MG/DL

## 2024-06-04 PROCEDURE — 99214 OFFICE O/P EST MOD 30 MIN: CPT | Performed by: STUDENT IN AN ORGANIZED HEALTH CARE EDUCATION/TRAINING PROGRAM

## 2024-06-04 PROCEDURE — 3075F SYST BP GE 130 - 139MM HG: CPT | Performed by: STUDENT IN AN ORGANIZED HEALTH CARE EDUCATION/TRAINING PROGRAM

## 2024-06-04 PROCEDURE — 3079F DIAST BP 80-89 MM HG: CPT | Performed by: STUDENT IN AN ORGANIZED HEALTH CARE EDUCATION/TRAINING PROGRAM

## 2024-06-04 PROCEDURE — 82962 GLUCOSE BLOOD TEST: CPT | Performed by: STUDENT IN AN ORGANIZED HEALTH CARE EDUCATION/TRAINING PROGRAM

## 2024-06-04 RX ORDER — DAPAGLIFLOZIN 10 MG/1
10 TABLET, FILM COATED ORAL DAILY
COMMUNITY
Start: 2024-04-15

## 2024-06-04 RX ORDER — DAPSONE 100 MG/1
100 TABLET ORAL DAILY
COMMUNITY
Start: 2024-04-16 | End: 2024-06-04

## 2024-06-04 RX ORDER — FAMOTIDINE 20 MG/1
20 TABLET, FILM COATED ORAL
COMMUNITY
Start: 2024-03-15 | End: 2024-06-04

## 2024-06-04 RX ORDER — BLOOD-GLUCOSE SENSOR
1 EACH MISCELLANEOUS
Qty: 1 EACH | Refills: 0 | Status: SHIPPED | COMMUNITY
Start: 2024-06-04

## 2024-06-04 RX ORDER — GLUCOSAMINE HCL/CHONDROITIN SU 500-400 MG
CAPSULE ORAL
COMMUNITY
Start: 2024-04-15

## 2024-06-04 RX ORDER — HYDROCHLOROTHIAZIDE 25 MG/1
25 TABLET ORAL DAILY
COMMUNITY
Start: 2024-04-15 | End: 2024-06-04

## 2024-06-04 RX ORDER — LANCETS 33 GAUGE
EACH MISCELLANEOUS
COMMUNITY
Start: 2024-04-15

## 2024-06-04 RX ORDER — BLOOD-GLUCOSE METER
EACH MISCELLANEOUS
COMMUNITY
Start: 2024-04-15

## 2024-06-04 RX ORDER — BLOOD SUGAR DIAGNOSTIC
STRIP MISCELLANEOUS
COMMUNITY
Start: 2024-04-15

## 2024-06-04 RX ORDER — PEN NEEDLE, DIABETIC 32GX 5/32"
NEEDLE, DISPOSABLE MISCELLANEOUS
COMMUNITY
Start: 2024-04-15

## 2024-06-04 RX ORDER — CALCIUM CARBONATE 500(1250)
1 TABLET ORAL EVERY MORNING
Qty: 30 TABLET | Refills: 5 | Status: SHIPPED | OUTPATIENT
Start: 2024-06-04

## 2024-06-04 RX ORDER — DULAGLUTIDE 0.75 MG/.5ML
INJECTION, SOLUTION SUBCUTANEOUS
Qty: 4 ADJUSTABLE DOSE PRE-FILLED PEN SYRINGE | Refills: 2 | Status: SHIPPED | OUTPATIENT
Start: 2024-06-04

## 2024-06-04 NOTE — PROGRESS NOTES
1. \"Have you been to the ER, urgent care clinic since your last visit?  Hospitalized since your last visit?\" No    2. \"Have you seen or consulted any other health care providers outside of the [facility] since your last visit?\" No    3. For patients aged 45-75: Has the patient had a colonoscopy / FIT/ Cologuard? Yes      If the patient is female:    4. For patients aged 40-74: Has the patient had a mammogram within the past 2 years? Yes      5. For patients aged 21-65: Has the patient had a pap smear? N/A    Chief Complaint   Patient presents with    [facility] Care    Diabetes     /88 (Site: Left Upper Arm, Position: Sitting, Cuff Size: Medium Adult)   Pulse 90   Temp 96.9 °F (36.1 °C) (Temporal)   Resp 18   Ht 1.6 m (5' 3\")   Wt 91.2 kg (201 lb)   SpO2 90%   BMI 35.61 kg/m²     
a week, E11.65    Continuous Glucose Sensor (FREESTYLE NICOLASA 3 SENSOR) MISC 1 each by Does not apply route every 14 days    hydrOXYzine HCl (ATARAX) 10 MG tablet TAKE 1 TABLET BY MOUTH THREE TIMES A DAY AS NEEDED FOR ANXIETY-MAY CAUSE DROWSINESS    estradiol (ESTRACE) 1 MG tablet TAKE 1 TABLET BY MOUTH EVERY DAY    insulin lispro (HUMALOG,ADMELOG) 100 UNIT/ML SOLN injection vial Inject 25 Units into the skin 3 times daily (before meals)    insulin glargine (LANTUS SOLOSTAR) 100 UNIT/ML injection pen Inject 35 Units into the skin nightly    NEEDLE, DISP, 30 G (BD DISP NEEDLES) 30G X 1/2\" MISC She takes Humalog 3 times a day and Lantus at bedtime so total 4 needles per day so I gave for 90-day supply    budesonide-formoterol (SYMBICORT) 160-4.5 MCG/ACT AERO Inhale 2 puffs into the lungs 2 times daily    sertraline (ZOLOFT) 50 MG tablet Take 1 tablet by mouth daily    pioglitazone (ACTOS) 30 MG tablet Take 1 tablet by mouth every morning (before breakfast)    metoprolol succinate (TOPROL XL) 25 MG extended release tablet Take 1 tablet by mouth daily    atorvastatin (LIPITOR) 20 MG tablet Take 1 tablet by mouth at bedtime    montelukast (SINGULAIR) 10 MG tablet Take 1 tablet by mouth daily    triamterene-hydroCHLOROthiazide (DYAZIDE) 37.5-25 MG per capsule Take 1 capsule by mouth every morning    aspirin 81 MG EC tablet Take 1 tablet by mouth every morning    albuterol sulfate HFA (PROVENTIL;VENTOLIN;PROAIR) 108 (90 Base) MCG/ACT inhaler INHALE 2 PUFFS BY MOUTH EVERY 4-6 HOURS AS FOR WHEEZING    fluticasone (FLONASE) 50 MCG/ACT nasal spray 2 sprays by Nasal route daily    calcium elemental (OSCAL) 500 MG TABS tablet Take 1 tablet by mouth every morning    omeprazole (PRILOSEC) 40 MG delayed release capsule Take 1 capsule by mouth daily    ondansetron (ZOFRAN-ODT) 4 MG disintegrating tablet TAKE 1 TABLET BY MOUTH EVERY 8 HOURS AS NEEDED FOR NAUSEA    fluocinonide (LIDEX) 0.05 % gel APPLY TOPICALLY 2 TIMES DAILY. IN THE

## 2024-06-07 ENCOUNTER — HOSPITAL ENCOUNTER (OUTPATIENT)
Facility: HOSPITAL | Age: 59
End: 2024-06-07
Attending: STUDENT IN AN ORGANIZED HEALTH CARE EDUCATION/TRAINING PROGRAM
Payer: MEDICAID

## 2024-06-07 DIAGNOSIS — E04.1 THYROID NODULE: ICD-10-CM

## 2024-06-07 PROCEDURE — 2709999900 US FINE NEEDLE ASPIRATION

## 2024-06-07 PROCEDURE — 2500000003 HC RX 250 WO HCPCS: Performed by: STUDENT IN AN ORGANIZED HEALTH CARE EDUCATION/TRAINING PROGRAM

## 2024-06-07 PROCEDURE — 88172 CYTP DX EVAL FNA 1ST EA SITE: CPT

## 2024-06-07 PROCEDURE — 88173 CYTOPATH EVAL FNA REPORT: CPT

## 2024-06-07 RX ORDER — LIDOCAINE HYDROCHLORIDE 10 MG/ML
10 INJECTION, SOLUTION EPIDURAL; INFILTRATION; INTRACAUDAL; PERINEURAL ONCE
Status: COMPLETED | OUTPATIENT
Start: 2024-06-07 | End: 2024-06-07

## 2024-06-07 RX ADMIN — LIDOCAINE HYDROCHLORIDE 10 ML: 10 INJECTION, SOLUTION EPIDURAL; INFILTRATION; INTRACAUDAL; PERINEURAL at 13:35

## 2024-06-10 ENCOUNTER — OFFICE VISIT (OUTPATIENT)
Age: 59
End: 2024-06-10
Payer: MEDICAID

## 2024-06-10 VITALS
DIASTOLIC BLOOD PRESSURE: 78 MMHG | OXYGEN SATURATION: 98 % | SYSTOLIC BLOOD PRESSURE: 128 MMHG | WEIGHT: 204 LBS | BODY MASS INDEX: 36.14 KG/M2 | HEART RATE: 96 BPM | RESPIRATION RATE: 16 BRPM | HEIGHT: 63 IN

## 2024-06-10 DIAGNOSIS — Z79.4 TYPE 2 DIABETES MELLITUS WITH HYPERGLYCEMIA, WITH LONG-TERM CURRENT USE OF INSULIN (HCC): ICD-10-CM

## 2024-06-10 DIAGNOSIS — R13.19 ESOPHAGEAL DYSPHAGIA: ICD-10-CM

## 2024-06-10 DIAGNOSIS — E04.1 THYROID NODULE: Primary | ICD-10-CM

## 2024-06-10 DIAGNOSIS — E11.65 TYPE 2 DIABETES MELLITUS WITH HYPERGLYCEMIA, WITH LONG-TERM CURRENT USE OF INSULIN (HCC): ICD-10-CM

## 2024-06-10 PROCEDURE — 3074F SYST BP LT 130 MM HG: CPT | Performed by: OTOLARYNGOLOGY

## 2024-06-10 PROCEDURE — 99203 OFFICE O/P NEW LOW 30 MIN: CPT | Performed by: OTOLARYNGOLOGY

## 2024-06-10 PROCEDURE — 3078F DIAST BP <80 MM HG: CPT | Performed by: OTOLARYNGOLOGY

## 2024-06-11 NOTE — PROGRESS NOTES
Assessment and Plan:   Thyroid nodules  Diabetes, poorly controlled  -She has a few solid and cystic nodules which are benign in appearance however she has a 1.4 cm right sided thyroid nodule which is solid and has some concerning features  -She has had FNA a couple of days ago, results of which are pending  -Discussed next steps will be dependent on biopsy result  -We reviewed role of surgical management  -She will need improved medical management prior to any surgical treatment  -I will plan to call her after I review FNA results.  If she sees these results she is welcome to call me to discuss them further, as I did not order the FNA the result will not come directly to me        The patient was instructed to return to clinic if no improvement or progression of symptoms. Signs to watch out for reviewed.      MD Frank Jerez [facility]  [address]  [place] [address]  Office Phone: [phone]

## 2024-06-13 RX ORDER — BLOOD-GLUCOSE METER
EACH MISCELLANEOUS
Qty: 1 KIT | Refills: 0 | Status: SHIPPED | OUTPATIENT
Start: 2024-06-13

## 2024-06-18 ENCOUNTER — PATIENT MESSAGE (OUTPATIENT)
Facility: CLINIC | Age: 59
End: 2024-06-18

## 2024-06-20 RX ORDER — BUDESONIDE AND FORMOTEROL FUMARATE DIHYDRATE 160; 4.5 UG/1; UG/1
2 AEROSOL RESPIRATORY (INHALATION) 2 TIMES DAILY
Qty: 10.2 EACH | Refills: 5 | Status: SHIPPED | OUTPATIENT
Start: 2024-06-20

## 2024-06-20 RX ORDER — OMEPRAZOLE 40 MG/1
40 CAPSULE, DELAYED RELEASE ORAL DAILY
Qty: 30 CAPSULE | Refills: 5 | Status: SHIPPED | OUTPATIENT
Start: 2024-06-20

## 2024-06-20 RX ORDER — INSULIN GLARGINE 100 [IU]/ML
35 INJECTION, SOLUTION SUBCUTANEOUS NIGHTLY
Qty: 5 ADJUSTABLE DOSE PRE-FILLED PEN SYRINGE | Refills: 3 | Status: SHIPPED | OUTPATIENT
Start: 2024-06-20

## 2024-06-20 RX ORDER — INSULIN LISPRO 100 [IU]/ML
25 INJECTION, SOLUTION INTRAVENOUS; SUBCUTANEOUS
Qty: 5 EACH | Refills: 3 | Status: SHIPPED | OUTPATIENT
Start: 2024-06-20

## 2024-06-20 RX ORDER — PIOGLITAZONEHYDROCHLORIDE 30 MG/1
30 TABLET ORAL
Qty: 30 TABLET | Refills: 5 | Status: SHIPPED | OUTPATIENT
Start: 2024-06-20

## 2024-06-20 RX ORDER — IBUPROFEN 200 MG
1 CAPSULE ORAL EVERY MORNING
Qty: 30 TABLET | Refills: 5 | Status: SHIPPED | OUTPATIENT
Start: 2024-06-20

## 2024-06-26 RX ORDER — HYDROXYZINE HYDROCHLORIDE 10 MG/1
25 TABLET, FILM COATED ORAL 2 TIMES DAILY PRN
Qty: 60 TABLET | Refills: 4 | Status: SHIPPED | OUTPATIENT
Start: 2024-06-26

## 2024-08-23 ENCOUNTER — TELEPHONE (OUTPATIENT)
Facility: CLINIC | Age: 59
End: 2024-08-23

## 2024-08-23 DIAGNOSIS — K21.9 GASTROESOPHAGEAL REFLUX DISEASE WITHOUT ESOPHAGITIS: Primary | ICD-10-CM

## 2024-08-23 RX ORDER — ONDANSETRON 4 MG/1
4 TABLET, ORALLY DISINTEGRATING ORAL EVERY 8 HOURS PRN
Qty: 30 TABLET | Refills: 3 | Status: SHIPPED | OUTPATIENT
Start: 2024-08-23

## 2024-10-14 PROBLEM — E08.8: Status: RESOLVED | Noted: 2023-07-13 | Resolved: 2024-10-14

## 2024-10-14 PROBLEM — J44.9 CHRONIC OBSTRUCTIVE PULMONARY DISEASE, UNSPECIFIED (HCC): Status: RESOLVED | Noted: 2024-03-20 | Resolved: 2024-10-14

## 2024-10-14 PROBLEM — Z79.4: Status: RESOLVED | Noted: 2023-07-13 | Resolved: 2024-10-14

## 2024-11-11 RX ORDER — TRIAMTERENE AND HYDROCHLOROTHIAZIDE 37.5; 25 MG/1; MG/1
1 CAPSULE ORAL EVERY MORNING
Qty: 30 CAPSULE | Refills: 5 | Status: SHIPPED | OUTPATIENT
Start: 2024-11-11

## 2024-11-13 ENCOUNTER — OFFICE VISIT (OUTPATIENT)
Age: 59
End: 2024-11-13
Payer: MEDICAID

## 2024-11-13 VITALS
HEART RATE: 84 BPM | WEIGHT: 199.7 LBS | BODY MASS INDEX: 35.38 KG/M2 | DIASTOLIC BLOOD PRESSURE: 86 MMHG | OXYGEN SATURATION: 98 % | SYSTOLIC BLOOD PRESSURE: 121 MMHG | RESPIRATION RATE: 16 BRPM | TEMPERATURE: 98 F | HEIGHT: 63 IN

## 2024-11-13 DIAGNOSIS — Z79.4 TYPE 2 DIABETES MELLITUS TREATED WITH INSULIN (HCC): Primary | ICD-10-CM

## 2024-11-13 DIAGNOSIS — E66.01 CLASS 2 SEVERE OBESITY DUE TO EXCESS CALORIES WITH SERIOUS COMORBIDITY AND BODY MASS INDEX (BMI) OF 35.0 TO 35.9 IN ADULT: ICD-10-CM

## 2024-11-13 DIAGNOSIS — E66.812 CLASS 2 SEVERE OBESITY DUE TO EXCESS CALORIES WITH SERIOUS COMORBIDITY AND BODY MASS INDEX (BMI) OF 35.0 TO 35.9 IN ADULT: ICD-10-CM

## 2024-11-13 DIAGNOSIS — E11.9 TYPE 2 DIABETES MELLITUS TREATED WITH INSULIN (HCC): Primary | ICD-10-CM

## 2024-11-13 PROCEDURE — 3074F SYST BP LT 130 MM HG: CPT | Performed by: STUDENT IN AN ORGANIZED HEALTH CARE EDUCATION/TRAINING PROGRAM

## 2024-11-13 PROCEDURE — 99214 OFFICE O/P EST MOD 30 MIN: CPT | Performed by: STUDENT IN AN ORGANIZED HEALTH CARE EDUCATION/TRAINING PROGRAM

## 2024-11-13 PROCEDURE — 3079F DIAST BP 80-89 MM HG: CPT | Performed by: STUDENT IN AN ORGANIZED HEALTH CARE EDUCATION/TRAINING PROGRAM

## 2024-11-13 RX ORDER — BLOOD SUGAR DIAGNOSTIC
STRIP MISCELLANEOUS
Qty: 300 EACH | Refills: 3 | Status: SHIPPED | OUTPATIENT
Start: 2024-11-13

## 2024-11-13 RX ORDER — BLOOD-GLUCOSE METER
1 EACH MISCELLANEOUS DAILY
Qty: 1 KIT | Refills: 0 | Status: SHIPPED | OUTPATIENT
Start: 2024-11-13 | End: 2024-11-13

## 2024-11-13 RX ORDER — DULAGLUTIDE 1.5 MG/.5ML
1.5 INJECTION, SOLUTION SUBCUTANEOUS WEEKLY
Qty: 2 ML | Refills: 1 | Status: SHIPPED | OUTPATIENT
Start: 2024-11-13

## 2024-11-13 RX ORDER — BLOOD-GLUCOSE METER
1 EACH MISCELLANEOUS DAILY
Qty: 1 KIT | Refills: 0 | Status: SHIPPED | OUTPATIENT
Start: 2024-11-13

## 2024-11-13 NOTE — PROGRESS NOTES
\"Have you been to the ER, urgent care clinic since your last visit?  Hospitalized since your last visit?\"    Yes, [facility] 10/2024    “Have you seen or consulted any other health care providers outside our system since your last visit?”    NO    Have you had a mammogram?”   NO    Date of last Mammogram: 3/23/2022       “Have you had a diabetic eye exam?”    NO     Date of last diabetic eye exam: 10/14/2021     Chief Complaint   Patient presents with    Follow-up    Diabetes    Thyroid Problem     /86 (Site: Right Upper Arm, Position: Sitting, Cuff Size: Large Adult)   Pulse 84   Temp 98 °F (36.7 °C) (Temporal)   Resp 16   Ht 1.6 m (5' 3\")   Wt 90.6 kg (199 lb 11.2 oz)   SpO2 98%   BMI 35.38 kg/m²        
11:07 AM      Lab Results   Component Value Date/Time    GFRAA 54 08/04/2022 03:00 PM    BUN 11 03/22/2024 03:25 PM     03/22/2024 03:25 PM    K 4.0 03/22/2024 03:25 PM    CL 94 03/22/2024 03:25 PM    CO2 25 03/22/2024 03:25 PM    MG 2.2 12/14/2023 09:53 PM       No results found for this visit on 11/13/24.        Assessment/Plan:     1. Type 2 diabetes mellitus treated with insulin (HCC)      1.Type 2 DM  Hemoglobin A1C   Date Value Ref Range Status   08/25/2021 9.5 (H) 4.8 - 5.6 % Final     Comment:              Prediabetes: 5.7 - 6.4           Diabetes: >6.4           Glycemic control for adults with diabetes: <7.0       Hemoglobin A1C, POC   Date Value Ref Range Status   03/20/2024 11.3 % Final     Uncontrolled as eviddneced by A1c of 10.4%  Recommendation  Inc trulicity to 1.5 mg/week   On lantus 35 units daily   Patient has a diagnosis of diabetes;   Patient is currently treated with >3  daily administrations of insulin or administers insulin continuously via pump;  Patient requires frequent adjustment to the insulin treatment regimen based on glucose results (either finger stick or CGM readings);   Patient was seen for diabetes management in past 6 months.  If hypoglycemia with trulicity- cut insulin down   Continue actos 30 mg daily  -Counseled on hypoglycemia  -Counseled on follow up with podiatry and ophthalmology   -Counseled on lifestyle measures   Uptodate on eye exam: no  Uptodate on DM labs :yes   Uptdate on foot exam :  11-14-24           2. Hypertension   On hctz 25- triamterene       3. Hyperlipidemia  LDL -153, Cholesterol- 239, Non HDL - 178 on 5/2/24 at [facility]     On atorvastatin     Orders Placed This Encounter   Procedures    Hemoglobin A1C     Standing Status:   Future     Standing Expiration Date:   11/13/2025     4. Thyroid Nodule   Us thyroid 1.  Multiple solid/cystic thyroid nodules bilaterally not warranting further  follow-up.  2.  Very hypoechoic right lobe nodule with moderate

## 2024-11-14 ENCOUNTER — OFFICE VISIT (OUTPATIENT)
Facility: CLINIC | Age: 59
End: 2024-11-14
Payer: MEDICAID

## 2024-11-14 VITALS
RESPIRATION RATE: 18 BRPM | SYSTOLIC BLOOD PRESSURE: 135 MMHG | TEMPERATURE: 97.5 F | WEIGHT: 201 LBS | DIASTOLIC BLOOD PRESSURE: 88 MMHG | HEIGHT: 63 IN | BODY MASS INDEX: 35.61 KG/M2 | HEART RATE: 84 BPM | OXYGEN SATURATION: 98 %

## 2024-11-14 DIAGNOSIS — G43.511 INTRACTABLE PERSISTENT MIGRAINE AURA WITHOUT CEREBRAL INFARCTION AND WITH STATUS MIGRAINOSUS: ICD-10-CM

## 2024-11-14 DIAGNOSIS — F51.01 PRIMARY INSOMNIA: Primary | ICD-10-CM

## 2024-11-14 DIAGNOSIS — J45.909 MODERATE ASTHMA WITHOUT COMPLICATION, UNSPECIFIED WHETHER PERSISTENT: ICD-10-CM

## 2024-11-14 DIAGNOSIS — E11.9 TYPE 2 DIABETES MELLITUS TREATED WITH INSULIN (HCC): ICD-10-CM

## 2024-11-14 DIAGNOSIS — Z79.4 TYPE 2 DIABETES MELLITUS TREATED WITH INSULIN (HCC): ICD-10-CM

## 2024-11-14 DIAGNOSIS — R53.83 OTHER FATIGUE: ICD-10-CM

## 2024-11-14 DIAGNOSIS — G47.33 OSA (OBSTRUCTIVE SLEEP APNEA): ICD-10-CM

## 2024-11-14 DIAGNOSIS — Z12.31 ENCOUNTER FOR SCREENING MAMMOGRAM FOR BREAST CANCER: ICD-10-CM

## 2024-11-14 PROCEDURE — 3079F DIAST BP 80-89 MM HG: CPT | Performed by: NURSE PRACTITIONER

## 2024-11-14 PROCEDURE — 3075F SYST BP GE 130 - 139MM HG: CPT | Performed by: NURSE PRACTITIONER

## 2024-11-14 PROCEDURE — 99214 OFFICE O/P EST MOD 30 MIN: CPT | Performed by: NURSE PRACTITIONER

## 2024-11-14 RX ORDER — ALBUTEROL SULFATE 90 UG/1
INHALANT RESPIRATORY (INHALATION)
Qty: 6.7 EACH | Refills: 5 | Status: SHIPPED | OUTPATIENT
Start: 2024-11-14

## 2024-11-14 RX ORDER — ZOLPIDEM TARTRATE 5 MG/1
5 TABLET ORAL NIGHTLY PRN
Qty: 30 TABLET | Refills: 0 | Status: SHIPPED | OUTPATIENT
Start: 2024-11-14 | End: 2024-12-14

## 2024-11-14 RX ORDER — BUDESONIDE AND FORMOTEROL FUMARATE DIHYDRATE 160; 4.5 UG/1; UG/1
2 AEROSOL RESPIRATORY (INHALATION) 2 TIMES DAILY
Qty: 10.2 EACH | Refills: 5 | Status: SHIPPED | OUTPATIENT
Start: 2024-11-14

## 2024-11-14 SDOH — ECONOMIC STABILITY: FOOD INSECURITY: WITHIN THE PAST 12 MONTHS, THE FOOD YOU BOUGHT JUST DIDN'T LAST AND YOU DIDN'T HAVE MONEY TO GET MORE.: NEVER TRUE

## 2024-11-14 SDOH — ECONOMIC STABILITY: INCOME INSECURITY: HOW HARD IS IT FOR YOU TO PAY FOR THE VERY BASICS LIKE FOOD, HOUSING, MEDICAL CARE, AND HEATING?: NOT HARD AT ALL

## 2024-11-14 SDOH — ECONOMIC STABILITY: FOOD INSECURITY: WITHIN THE PAST 12 MONTHS, YOU WORRIED THAT YOUR FOOD WOULD RUN OUT BEFORE YOU GOT MONEY TO BUY MORE.: NEVER TRUE

## 2024-11-14 ASSESSMENT — ENCOUNTER SYMPTOMS
EYE REDNESS: 0
RHINORRHEA: 0
NAUSEA: 0
EYE PAIN: 0
TROUBLE SWALLOWING: 0
EYE DISCHARGE: 0
SORE THROAT: 0
CHEST TIGHTNESS: 0
COUGH: 0
COLOR CHANGE: 0
VOMITING: 0
ABDOMINAL PAIN: 0

## 2024-11-14 NOTE — PROGRESS NOTES
\"Have you been to the ER, urgent care clinic since your last visit?  Hospitalized since your last visit?\"    YES - When: approximately October 2024 ago.  Where and Why: [facility].    “Have you seen or consulted any other health care providers outside of [facility] since your last visit?”    NO    Have you had a mammogram?”   NO    Date of last Mammogram: 3/23/2022     Chief Complaint   Patient presents with    Follow-up Chronic Condition     BP (!) 139/91 (Site: Right Upper Arm, Position: Sitting, Cuff Size: Medium Adult)   Pulse 84   Temp 97.5 °F (36.4 °C) (Temporal)   Resp 18   Ht 1.6 m (5' 3\")   Wt 91.2 kg (201 lb)   SpO2 98%   BMI 35.61 kg/m²           Click Here for Release of Records Request

## 2024-11-14 NOTE — PROGRESS NOTES
Subjective  Chief Complaint   Patient presents with    Follow-up Chronic Condition     HPI:  Kyra Castelan is a 59 y.o. female presenting with past medical history of obstructive sleep apnea, type 2 diabetes with insulin, primary insomnia, fatigue, GERD, hyperlipidemia migraines without aura. Patient presents to establish relationship with primary care treatment team, medication management and reconciliation, physical examination, and health maintenance management.  Reports increased respiratory symptoms, restarting patient on Symbicort as she is tolerated well in the past.  Prescribing rescue inhaler of albuterol.  Will refer to pulmonology for additional evaluation and treatment recommendations.    Patient reports worsening insomnia, will restart Ambien.  Follow-up with patient to evaluate her response to medication restarting provided patient with sleep hygiene recommendations.    Patient being followed by endocrinology for management of insulin-dependent type 2 diabetes-continue Humalog with meals, 35 units of Lantus at night and Trulicity.  Most recent hemoglobin A1c 10.4 May 2024.    Conducted diabetic foot examination monofilament left foot 9/10 right foot 10/10.      Review of Systems   Constitutional:  Negative for chills, fatigue and fever.   HENT:  Negative for ear pain, hearing loss, nosebleeds, rhinorrhea, sore throat and trouble swallowing.    Eyes:  Negative for pain, discharge and redness.   Respiratory:  Positive for shortness of breath and wheezing. Negative for cough and chest tightness.    Gastrointestinal:  Negative for abdominal pain, nausea and vomiting.   Endocrine: Negative for cold intolerance and heat intolerance.   Genitourinary:  Negative for dysuria and genital sores.   Skin:  Negative for color change and rash.   Neurological:  Negative for dizziness, seizures, weakness, light-headedness and headaches.   Psychiatric/Behavioral:  Negative for agitation and confusion.         Past

## 2024-11-15 ASSESSMENT — ENCOUNTER SYMPTOMS
WHEEZING: 1
SHORTNESS OF BREATH: 1

## 2024-12-03 ENCOUNTER — TELEPHONE (OUTPATIENT)
Facility: CLINIC | Age: 59
End: 2024-12-03

## 2024-12-03 NOTE — TELEPHONE ENCOUNTER
Patient stated that she has had a cough and sinus drainage for the past four days. She stated she has tried over the counter medicine with no relief. She stated that she has been wheezing as well. She would like an antibiotic to [facility].

## 2024-12-06 RX ORDER — AZITHROMYCIN 250 MG/1
TABLET, FILM COATED ORAL
Qty: 6 TABLET | Refills: 0 | Status: SHIPPED | OUTPATIENT
Start: 2024-12-06 | End: 2024-12-16

## 2024-12-13 ENCOUNTER — HOSPITAL ENCOUNTER (OUTPATIENT)
Facility: HOSPITAL | Age: 59
Discharge: HOME | End: 2024-12-16
Payer: MEDICAID

## 2024-12-13 DIAGNOSIS — Z12.31 ENCOUNTER FOR SCREENING MAMMOGRAM FOR BREAST CANCER: ICD-10-CM

## 2024-12-13 DIAGNOSIS — Z79.4 TYPE 2 DIABETES MELLITUS TREATED WITH INSULIN (HCC): ICD-10-CM

## 2024-12-13 DIAGNOSIS — E11.9 TYPE 2 DIABETES MELLITUS TREATED WITH INSULIN (HCC): ICD-10-CM

## 2024-12-13 PROCEDURE — 77063 BREAST TOMOSYNTHESIS BI: CPT

## 2024-12-14 DIAGNOSIS — E11.9 TYPE 2 DIABETES MELLITUS TREATED WITH INSULIN (HCC): ICD-10-CM

## 2024-12-14 DIAGNOSIS — Z79.4 TYPE 2 DIABETES MELLITUS TREATED WITH INSULIN (HCC): ICD-10-CM

## 2024-12-27 ENCOUNTER — TELEPHONE (OUTPATIENT)
Facility: CLINIC | Age: 59
End: 2024-12-27

## 2024-12-27 RX ORDER — AZITHROMYCIN 250 MG/1
TABLET, FILM COATED ORAL
Qty: 6 TABLET | Refills: 0 | Status: SHIPPED | OUTPATIENT
Start: 2024-12-27 | End: 2025-01-06

## 2024-12-27 NOTE — TELEPHONE ENCOUNTER
Patient called and stated that her headaches are back. She stated that she has pressure in her head. It has been consent since her last visit with [facility]. She would like something called in to [facility].

## 2025-01-03 ENCOUNTER — OFFICE VISIT (OUTPATIENT)
Facility: CLINIC | Age: 60
End: 2025-01-03
Payer: MEDICAID

## 2025-01-03 VITALS
OXYGEN SATURATION: 95 % | SYSTOLIC BLOOD PRESSURE: 140 MMHG | HEIGHT: 63 IN | BODY MASS INDEX: 34.02 KG/M2 | TEMPERATURE: 98.3 F | HEART RATE: 70 BPM | DIASTOLIC BLOOD PRESSURE: 93 MMHG | RESPIRATION RATE: 16 BRPM | WEIGHT: 192 LBS

## 2025-01-03 DIAGNOSIS — J30.9 ALLERGIC RHINITIS, UNSPECIFIED SEASONALITY, UNSPECIFIED TRIGGER: ICD-10-CM

## 2025-01-03 DIAGNOSIS — H91.93 BILATERAL CHANGE IN HEARING: ICD-10-CM

## 2025-01-03 DIAGNOSIS — J32.9 CHRONIC SINUSITIS, UNSPECIFIED LOCATION: ICD-10-CM

## 2025-01-03 DIAGNOSIS — H66.90 EAR INFECTION: ICD-10-CM

## 2025-01-03 DIAGNOSIS — K21.9 GASTROESOPHAGEAL REFLUX DISEASE WITHOUT ESOPHAGITIS: ICD-10-CM

## 2025-01-03 DIAGNOSIS — I10 ESSENTIAL (PRIMARY) HYPERTENSION: Primary | ICD-10-CM

## 2025-01-03 PROCEDURE — 3077F SYST BP >= 140 MM HG: CPT | Performed by: NURSE PRACTITIONER

## 2025-01-03 PROCEDURE — 3080F DIAST BP >= 90 MM HG: CPT | Performed by: NURSE PRACTITIONER

## 2025-01-03 PROCEDURE — 99214 OFFICE O/P EST MOD 30 MIN: CPT | Performed by: NURSE PRACTITIONER

## 2025-01-03 RX ORDER — NEOMYCIN SULFATE, POLYMYXIN B SULFATE, HYDROCORTISONE 3.5; 10000; 1 MG/ML; [USP'U]/ML; MG/ML
4 SOLUTION/ DROPS AURICULAR (OTIC) 3 TIMES DAILY
Qty: 10 ML | Refills: 1 | Status: SHIPPED | OUTPATIENT
Start: 2025-01-03 | End: 2025-02-05

## 2025-01-03 ASSESSMENT — ENCOUNTER SYMPTOMS
SORE THROAT: 0
SHORTNESS OF BREATH: 1
NAUSEA: 0
EYE DISCHARGE: 0
COUGH: 1
TROUBLE SWALLOWING: 0
EYE PAIN: 0
SINUS PRESSURE: 1
EYE REDNESS: 0
RHINORRHEA: 1
COLOR CHANGE: 0
WHEEZING: 0
VOMITING: 0
SINUS PAIN: 1
ABDOMINAL PAIN: 0
CHEST TIGHTNESS: 0

## 2025-01-03 ASSESSMENT — PATIENT HEALTH QUESTIONNAIRE - PHQ9
7. TROUBLE CONCENTRATING ON THINGS, SUCH AS READING THE NEWSPAPER OR WATCHING TELEVISION: NOT AT ALL
SUM OF ALL RESPONSES TO PHQ QUESTIONS 1-9: 0
2. FEELING DOWN, DEPRESSED OR HOPELESS: NOT AT ALL
5. POOR APPETITE OR OVEREATING: NOT AT ALL
SUM OF ALL RESPONSES TO PHQ QUESTIONS 1-9: 0
8. MOVING OR SPEAKING SO SLOWLY THAT OTHER PEOPLE COULD HAVE NOTICED. OR THE OPPOSITE, BEING SO FIGETY OR RESTLESS THAT YOU HAVE BEEN MOVING AROUND A LOT MORE THAN USUAL: NOT AT ALL
3. TROUBLE FALLING OR STAYING ASLEEP: NOT AT ALL
9. THOUGHTS THAT YOU WOULD BE BETTER OFF DEAD, OR OF HURTING YOURSELF: NOT AT ALL
6. FEELING BAD ABOUT YOURSELF - OR THAT YOU ARE A FAILURE OR HAVE LET YOURSELF OR YOUR FAMILY DOWN: NOT AT ALL
10. IF YOU CHECKED OFF ANY PROBLEMS, HOW DIFFICULT HAVE THESE PROBLEMS MADE IT FOR YOU TO DO YOUR WORK, TAKE CARE OF THINGS AT HOME, OR GET ALONG WITH OTHER PEOPLE: NOT DIFFICULT AT ALL
1. LITTLE INTEREST OR PLEASURE IN DOING THINGS: NOT AT ALL
SUM OF ALL RESPONSES TO PHQ QUESTIONS 1-9: 0
SUM OF ALL RESPONSES TO PHQ QUESTIONS 1-9: 0
SUM OF ALL RESPONSES TO PHQ9 QUESTIONS 1 & 2: 0
4. FEELING TIRED OR HAVING LITTLE ENERGY: NOT AT ALL

## 2025-01-03 NOTE — ASSESSMENT & PLAN NOTE
Chronic, worsening (exacerbation), continue current plan pending work up below and medication adherence emphasized    Orders:    Rhea Reynaga MD, Otolaryngology, [place]    loratadine-pseudoephedrine (CLARITIN-D 12HR) 5-120 MG per extended release tablet; Take 1 tablet by mouth 2 times daily

## 2025-01-03 NOTE — ASSESSMENT & PLAN NOTE
Chronic, not at goal (unstable), continue current treatment plan, medication adherence emphasized, and lifestyle modifications recommended  BP Readings from Last 3 Encounters:   01/03/25 (!) 140/93   11/14/24 135/88   11/13/24 121/86

## 2025-01-03 NOTE — ASSESSMENT & PLAN NOTE
Chronic, at goal (stable), continue current treatment plan, medication adherence emphasized, and lifestyle modifications recommended

## 2025-01-03 NOTE — PROGRESS NOTES
Subjective  Chief Complaint   Patient presents with    Congestion    Headache     HPI:  Kyra Castelan is a 59 y.o. female congestion and headache.Past medical history of obstructive sleep apnea, type 2 diabetes with insulin, primary insomnia, fatigue, GERD, hyperlipidemia migraines without aura.    Patient's primary complaint during today's visit includes worsening inner ear discomfort, sinus pressure, postnasal drip.  Patient also reports worsening congestion with cough and intermittent wheezing.  Patient states she has an appointment to follow-up with pulmonology in 1 week.  In the interim referring patient to ENT for additional evaluation secondary to change in hearing and persistent ear and sinus pressure.    Continue twice daily Symbicort and albuterol inhalers as prescribed.  Prescribing Claritin decongestion for sinus and allergic rhinitis symptom management.  Patient reports inner ear fullness and discomfort, prescribing optic drops.  Will evaluate patient's response to treatment regimen at follow-up office visit.        Review of Systems   Constitutional:  Negative for chills, fatigue and fever.   HENT:  Positive for ear pain, hearing loss, postnasal drip, rhinorrhea, sinus pressure and sinus pain. Negative for nosebleeds, sore throat and trouble swallowing.    Eyes:  Negative for pain, discharge and redness.   Respiratory:  Positive for cough and shortness of breath. Negative for chest tightness and wheezing.    Gastrointestinal:  Negative for abdominal pain, nausea and vomiting.   Endocrine: Negative for cold intolerance and heat intolerance.   Skin:  Negative for color change and rash.   Neurological:  Negative for dizziness, seizures, weakness, light-headedness and headaches.   Psychiatric/Behavioral:  Negative for agitation and confusion.         Past Medical History:   Diagnosis Date    Abnormal weight gain 09/09/2020    Anemia     Asthma     Cellulitis and abscess of lower extremity 09/09/2020

## 2025-01-03 NOTE — ASSESSMENT & PLAN NOTE
Chronic, worsening (exacerbation), continue current plan pending work up below    Orders:    Rhea Reynaga MD, Otolaryngology, [place]

## 2025-01-03 NOTE — PROGRESS NOTES
Chief Complaint   Patient presents with    Congestion    Headache           BP (!) 140/93 (Site: Left Upper Arm, Position: Sitting)   Pulse 70   Temp 98.3 °F (36.8 °C) (Oral)   Resp 16   Ht 1.6 m (5' 3\")   Wt 87.1 kg (192 lb)   SpO2 95%   BMI 34.01 kg/m²         \"Have you been to the ER, urgent care clinic since your last visit?  Hospitalized since your last visit?\"    NO    “Have you seen or consulted any other health care providers outside our system since your last visit?”    NO      “Have you had a diabetic eye exam?”    NO     Date of last diabetic eye exam: 10/14/2021

## 2025-01-22 LAB
ALBUMIN/CREAT UR: 9 MG/G CREAT (ref 0–29)
CREAT UR-MCNC: 79.2 MG/DL
HBA1C MFR BLD: 9.8 % (ref 4.8–5.6)
MICROALBUMIN UR-MCNC: 6.9 UG/ML
TSH SERPL DL<=0.005 MIU/L-ACNC: 0.69 UIU/ML (ref 0.45–4.5)

## 2025-05-07 LAB — TSH SERPL DL<=0.005 MIU/L-ACNC: 0.59 UIU/ML (ref 0.45–4.5)

## (undated) DEVICE — GLOVE SURG SZ 65 THK91MIL LTX FREE SYN POLYISOPRENE

## (undated) DEVICE — EXTREMITY-SFMCASU: Brand: MEDLINE INDUSTRIES, INC.

## (undated) DEVICE — BNDG ELAS HK LOOP 6X10YD LF -- MATRIX

## (undated) DEVICE — Device

## (undated) DEVICE — PADDING CST 4IN STERILE --

## (undated) DEVICE — BLADE SAW 40X11X0.38 MM REPL 8.7 CM TEETH LAPIPLASTY

## (undated) DEVICE — ZIMMER® STERILE DISPOSABLE TOURNIQUET CUFF WITH PROTECTIVE SLEEVE AND PLC, DUAL PORT, SINGLE BLADDER, 18 IN. (46 CM)

## (undated) DEVICE — SUTURE VCRL SZ 3-0 L27IN ABSRB UD L26MM SH 1/2 CIR J416H

## (undated) DEVICE — SOLUTION IRRIG 500ML 0.9% SOD CHLO USP POUR PLAS BTL

## (undated) DEVICE — GLOVE SURG SZ 65 L12IN FNGR THK79MIL GRN LTX FREE

## (undated) DEVICE — SPONGE GZ W4XL4IN COT 12 PLY TYP VII WVN C FLD DSGN STERILE

## (undated) DEVICE — CANISTER, RIGID, 3000CC: Brand: MEDLINE INDUSTRIES, INC.

## (undated) DEVICE — SUTURE MCRYL SZ 4-0 L27IN ABSRB UD L19MM PS-2 1/2 CIR PRIM Y426H